# Patient Record
Sex: FEMALE | Race: WHITE | Employment: OTHER | URBAN - METROPOLITAN AREA
[De-identification: names, ages, dates, MRNs, and addresses within clinical notes are randomized per-mention and may not be internally consistent; named-entity substitution may affect disease eponyms.]

---

## 2017-01-03 ENCOUNTER — HOSPITAL ENCOUNTER (OUTPATIENT)
Dept: WOMENS IMAGING | Age: 62
Discharge: HOME | End: 2017-01-03
Attending: INTERNAL MEDICINE | Admitting: INTERNAL MEDICINE

## 2017-01-03 DIAGNOSIS — Z12.39 SCREENING BREAST EXAMINATION: ICD-10-CM

## 2017-01-05 ENCOUNTER — HOSPITAL ENCOUNTER (OUTPATIENT)
Dept: OTHER | Age: 62
Discharge: HOME | End: 2017-01-05
Attending: INTERNAL MEDICINE | Admitting: INTERNAL MEDICINE

## 2017-01-05 LAB
ALBUMIN SERPL-MCNC: 4.1 GM/DL (ref 3.4–5)
ALP BLD-CCNC: 54 IU/L (ref 40–129)
ALT SERPL-CCNC: 16 U/L (ref 10–40)
ANION GAP SERPL CALCULATED.3IONS-SCNC: 14 MMOL/L (ref 4–16)
AST SERPL-CCNC: 21 IU/L (ref 15–37)
BILIRUB SERPL-MCNC: 0.3 MG/DL (ref 0–1)
BUN BLDV-MCNC: 14 MG/DL (ref 6–23)
CALCIUM SERPL-MCNC: 8.9 MG/DL (ref 8.3–10.6)
CHLORIDE BLD-SCNC: 90 MMOL/L (ref 99–110)
CO2: 27 MMOL/L (ref 21–32)
CREAT SERPL-MCNC: 0.9 MG/DL (ref 0.6–1.1)
GFR AFRICAN AMERICAN: >60 ML/MIN/1.73M2
GFR NON-AFRICAN AMERICAN: >60 ML/MIN/1.73M2
GLUCOSE FASTING: 100 MG/DL (ref 70–99)
POTASSIUM SERPL-SCNC: 4 MMOL/L (ref 3.5–5.1)
SODIUM BLD-SCNC: 131 MMOL/L (ref 135–145)
TOTAL PROTEIN: 6.3 GM/DL (ref 6.4–8.2)

## 2017-01-31 ENCOUNTER — HOSPITAL ENCOUNTER (OUTPATIENT)
Dept: OTHER | Age: 62
Discharge: HOME | End: 2017-01-31
Attending: INTERNAL MEDICINE | Admitting: INTERNAL MEDICINE

## 2017-01-31 LAB
ALBUMIN SERPL-MCNC: 4.5 GM/DL (ref 3.4–5)
ALP BLD-CCNC: 56 IU/L (ref 40–128)
ALT SERPL-CCNC: 21 U/L (ref 10–40)
ANION GAP SERPL CALCULATED.3IONS-SCNC: 11 MMOL/L (ref 4–16)
AST SERPL-CCNC: 24 IU/L (ref 15–37)
BILIRUB SERPL-MCNC: 0.6 MG/DL (ref 0–1)
BUN BLDV-MCNC: 12 MG/DL (ref 6–23)
CALCIUM SERPL-MCNC: 9.8 MG/DL (ref 8.3–10.6)
CHLORIDE BLD-SCNC: 95 MMOL/L (ref 99–110)
CO2: 29 MMOL/L (ref 21–32)
CREAT SERPL-MCNC: 0.9 MG/DL (ref 0.6–1.1)
GFR AFRICAN AMERICAN: >60 ML/MIN/1.73M2
GFR NON-AFRICAN AMERICAN: >60 ML/MIN/1.73M2
GLUCOSE FASTING: 79 MG/DL (ref 70–99)
IGA: <10 MG/DL (ref 69–382)
IGG,SERUM: 999 MG/DL (ref 723–1685)
IGM,SERUM: 55 MG/DL (ref 62–277)
POTASSIUM SERPL-SCNC: 4 MMOL/L (ref 3.5–5.1)
SODIUM BLD-SCNC: 135 MMOL/L (ref 135–145)
TOTAL PROTEIN: 6.7 GM/DL (ref 6.4–8.2)

## 2017-02-01 LAB — BETA-2 MICROGLOBULIN: 1.7

## 2017-02-02 LAB
ALBUMIN ELP: 3.9 GM/DL (ref 3.2–5.6)
ALPHA-1-GLOBULIN: 0.2 GM/DL (ref 0.1–0.4)
ALPHA-2-GLOBULIN: 0.7 GM/DL (ref 0.4–1.2)
BETA GLOBULIN: 0.9 GM/DL (ref 0.5–1.3)
GAMMA GLOBULIN: 1 GM/DL (ref 0.5–1.6)
IMMUNOFIXATION ELECTROPHORESIS 1: NORMAL
KAPPA QUANT FREE LIGHT CHAINS: 1.17
KAPPA/LAMBDA FREE LIGHT CHAIN RATIO: 1.36
LAMBDA FREE LIGHT CHAINS URINE/ VOL: 0.86
PATHOLOGIST: NORMAL
TOTAL PROTEIN: 6.7 GM/DL (ref 6.4–8.2)

## 2017-03-02 ENCOUNTER — HOSPITAL ENCOUNTER (OUTPATIENT)
Dept: GENERAL RADIOLOGY | Age: 62
Discharge: HOME | End: 2017-03-02
Attending: INTERNAL MEDICINE | Admitting: INTERNAL MEDICINE

## 2017-03-02 LAB
CHOLESTEROL: 243 MG/DL
ESTIMATED AVERAGE GLUCOSE: 114 MG/DL
HBA1C MFR BLD: 5.6 % (ref 4.2–6.3)
HDLC SERPL-MCNC: 85 MG/DL
HEPATITIS C ANTIBODY: NON REACTIVE
HIV SCREEN: NON REACTIVE
LDL CHOLESTEROL DIRECT: 161 MG/DL
TRIGL SERPL-MCNC: 73 MG/DL

## 2017-03-08 ENCOUNTER — TELEPHONE (OUTPATIENT)
Dept: INTERNAL MEDICINE CLINIC | Age: 62
End: 2017-03-08

## 2017-03-09 ENCOUNTER — OFFICE VISIT (OUTPATIENT)
Dept: INTERNAL MEDICINE CLINIC | Age: 62
End: 2017-03-09

## 2017-03-09 VITALS
SYSTOLIC BLOOD PRESSURE: 134 MMHG | HEART RATE: 71 BPM | HEIGHT: 65 IN | BODY MASS INDEX: 27.16 KG/M2 | DIASTOLIC BLOOD PRESSURE: 76 MMHG | RESPIRATION RATE: 16 BRPM | WEIGHT: 163 LBS

## 2017-03-09 DIAGNOSIS — I10 ESSENTIAL HYPERTENSION: ICD-10-CM

## 2017-03-09 DIAGNOSIS — C90.01 MULTIPLE MYELOMA IN REMISSION (HCC): ICD-10-CM

## 2017-03-09 DIAGNOSIS — J30.89 PERENNIAL ALLERGIC RHINITIS, UNSPECIFIED ALLERGIC RHINITIS TRIGGER: Primary | ICD-10-CM

## 2017-03-09 DIAGNOSIS — E78.2 MIXED HYPERLIPIDEMIA: ICD-10-CM

## 2017-03-09 PROCEDURE — 99213 OFFICE O/P EST LOW 20 MIN: CPT | Performed by: INTERNAL MEDICINE

## 2017-03-09 RX ORDER — CHLORAL HYDRATE 500 MG
CAPSULE ORAL
COMMUNITY

## 2017-03-09 RX ORDER — AZELASTINE 1 MG/ML
2 SPRAY, METERED NASAL 2 TIMES DAILY
Qty: 1 BOTTLE | Refills: 3 | Status: SHIPPED | OUTPATIENT
Start: 2017-03-09 | End: 2018-04-04 | Stop reason: SDUPTHER

## 2017-03-09 RX ORDER — DULOXETIN HYDROCHLORIDE 60 MG/1
60 CAPSULE, DELAYED RELEASE ORAL
COMMUNITY
Start: 2017-03-06 | End: 2020-09-30

## 2017-03-09 RX ORDER — MULTIVITAMIN
CAPSULE ORAL
COMMUNITY

## 2017-03-09 ASSESSMENT — ENCOUNTER SYMPTOMS
EYE PAIN: 0
SORE THROAT: 0
COUGH: 1
SHORTNESS OF BREATH: 0
BACK PAIN: 0
DIARRHEA: 0
ABDOMINAL PAIN: 0
CONSTIPATION: 0
WHEEZING: 0

## 2017-03-28 ENCOUNTER — HOSPITAL ENCOUNTER (OUTPATIENT)
Dept: OTHER | Age: 62
Discharge: HOME | End: 2017-03-28
Attending: INTERNAL MEDICINE | Admitting: INTERNAL MEDICINE

## 2017-03-28 LAB
ALBUMIN SERPL-MCNC: 4.3 GM/DL (ref 3.4–5)
ALP BLD-CCNC: 54 IU/L (ref 40–129)
ALT SERPL-CCNC: 18 U/L (ref 10–40)
ANION GAP SERPL CALCULATED.3IONS-SCNC: 13 MMOL/L (ref 4–16)
AST SERPL-CCNC: 23 IU/L (ref 15–37)
BILIRUB SERPL-MCNC: 0.4 MG/DL (ref 0–1)
BUN BLDV-MCNC: 13 MG/DL (ref 6–23)
CALCIUM SERPL-MCNC: 9 MG/DL (ref 8.3–10.6)
CHLORIDE BLD-SCNC: 94 MMOL/L (ref 99–110)
CO2: 29 MMOL/L (ref 21–32)
CREAT SERPL-MCNC: 1 MG/DL (ref 0.6–1.1)
GFR AFRICAN AMERICAN: >60 ML/MIN/1.73M2
GFR NON-AFRICAN AMERICAN: 56 ML/MIN/1.73M2
GLUCOSE BLD-MCNC: 82 MG/DL (ref 70–140)
IGA: <50 MG/DL (ref 69–382)
IGG,SERUM: 876 MG/DL (ref 723–1685)
IGM,SERUM: 52 MG/DL (ref 62–277)
POTASSIUM SERPL-SCNC: 4.1 MMOL/L (ref 3.5–5.1)
SODIUM BLD-SCNC: 136 MMOL/L (ref 135–145)
TOTAL PROTEIN: 6.2 GM/DL (ref 6.4–8.2)

## 2017-03-30 LAB
ALBUMIN ELP: 3.6 GM/DL (ref 3.2–5.6)
ALPHA-1-GLOBULIN: 0.2 GM/DL (ref 0.1–0.4)
ALPHA-2-GLOBULIN: 0.6 GM/DL (ref 0.4–1.2)
BETA GLOBULIN: 1 GM/DL (ref 0.5–1.3)
BETA-2 MICROGLOBULIN: 1.7
GAMMA GLOBULIN: 0.8 GM/DL (ref 0.5–1.6)
IMMUNOFIXATION ELECTROPHORESIS 1: NORMAL
KAPPA QUANT FREE LIGHT CHAINS: 0.98
KAPPA/LAMBDA FREE LIGHT CHAIN RATIO: 1.11
LAMBDA FREE LIGHT CHAINS URINE/ VOL: 0.88
TOTAL PROTEIN: 6.2 GM/DL (ref 6.4–8.2)

## 2017-04-25 DIAGNOSIS — R07.89 CHEST WALL PAIN: ICD-10-CM

## 2017-04-25 DIAGNOSIS — M54.41 CHRONIC MIDLINE LOW BACK PAIN WITH RIGHT-SIDED SCIATICA: ICD-10-CM

## 2017-04-25 DIAGNOSIS — G89.29 CHRONIC MIDLINE LOW BACK PAIN WITH RIGHT-SIDED SCIATICA: ICD-10-CM

## 2017-04-25 RX ORDER — IBUPROFEN 400 MG/1
400 TABLET ORAL EVERY 6 HOURS PRN
Qty: 120 TABLET | Refills: 0 | Status: SHIPPED | OUTPATIENT
Start: 2017-04-25 | End: 2017-07-10

## 2017-05-09 ENCOUNTER — HOSPITAL ENCOUNTER (OUTPATIENT)
Dept: OTHER | Age: 62
Discharge: HOME | End: 2017-05-09
Attending: INTERNAL MEDICINE | Admitting: INTERNAL MEDICINE

## 2017-05-09 LAB
ALBUMIN SERPL-MCNC: 4.2 GM/DL (ref 3.4–5)
ALP BLD-CCNC: 52 IU/L (ref 40–129)
ALT SERPL-CCNC: 17 U/L (ref 10–40)
ANION GAP SERPL CALCULATED.3IONS-SCNC: 14 MMOL/L (ref 4–16)
AST SERPL-CCNC: 22 IU/L (ref 15–37)
BILIRUB SERPL-MCNC: 0.5 MG/DL (ref 0–1)
BUN BLDV-MCNC: 13 MG/DL (ref 6–23)
CALCIUM SERPL-MCNC: 8.7 MG/DL (ref 8.3–10.6)
CHLORIDE BLD-SCNC: 93 MMOL/L (ref 99–110)
CO2: 25 MMOL/L (ref 21–32)
CREAT SERPL-MCNC: 1 MG/DL (ref 0.6–1.1)
GFR AFRICAN AMERICAN: >60 ML/MIN/1.73M2
GFR NON-AFRICAN AMERICAN: 56 ML/MIN/1.73M2
GLUCOSE BLD-MCNC: 125 MG/DL (ref 70–140)
IGA: <50 MG/DL (ref 69–382)
IGG,SERUM: 819 MG/DL (ref 723–1685)
IGM,SERUM: 50 MG/DL (ref 62–277)
POTASSIUM SERPL-SCNC: 3.8 MMOL/L (ref 3.5–5.1)
SODIUM BLD-SCNC: 132 MMOL/L (ref 135–145)
TOTAL PROTEIN: 5.9 GM/DL (ref 6.4–8.2)

## 2017-05-10 LAB
ALBUMIN ELP: 3.5 GM/DL (ref 3.2–5.6)
ALPHA-1-GLOBULIN: 0.2 GM/DL (ref 0.1–0.4)
ALPHA-2-GLOBULIN: 0.6 GM/DL (ref 0.4–1.2)
BETA GLOBULIN: 0.8 GM/DL (ref 0.5–1.3)
GAMMA GLOBULIN: 0.9 GM/DL (ref 0.5–1.6)
IMMUNOFIXATION ELECTROPHORESIS 1: NORMAL
TOTAL PROTEIN: 5.9 GM/DL (ref 6.4–8.2)

## 2017-05-11 LAB — BETA-2 MICROGLOBULIN: 1.7

## 2017-05-12 LAB
KAPPA QUANT FREE LIGHT CHAINS: 1.26
KAPPA/LAMBDA FREE LIGHT CHAIN RATIO: 1.73
LAMBDA FREE LIGHT CHAINS URINE/ VOL: 0.73

## 2017-07-05 ENCOUNTER — HOSPITAL ENCOUNTER (OUTPATIENT)
Dept: OTHER | Age: 62
Discharge: HOME | End: 2017-07-05
Attending: INTERNAL MEDICINE | Admitting: INTERNAL MEDICINE

## 2017-07-05 LAB
ALBUMIN SERPL-MCNC: 4 GM/DL (ref 3.4–5)
ALP BLD-CCNC: 52 IU/L (ref 40–128)
ALT SERPL-CCNC: 16 U/L (ref 10–40)
ANION GAP SERPL CALCULATED.3IONS-SCNC: 13 MMOL/L (ref 4–16)
AST SERPL-CCNC: 21 IU/L (ref 15–37)
BILIRUB SERPL-MCNC: 0.4 MG/DL (ref 0–1)
BUN BLDV-MCNC: 13 MG/DL (ref 6–23)
CALCIUM SERPL-MCNC: 9.3 MG/DL (ref 8.3–10.6)
CHLORIDE BLD-SCNC: 94 MMOL/L (ref 99–110)
CO2: 27 MMOL/L (ref 21–32)
CREAT SERPL-MCNC: 1 MG/DL (ref 0.6–1.1)
GFR AFRICAN AMERICAN: >60 ML/MIN/1.73M2
GFR NON-AFRICAN AMERICAN: 56 ML/MIN/1.73M2
GLUCOSE BLD-MCNC: 99 MG/DL (ref 70–140)
IGA: <50 MG/DL (ref 69–382)
IGG,SERUM: 837 MG/DL (ref 723–1685)
IGM,SERUM: 51 MG/DL (ref 62–277)
POTASSIUM SERPL-SCNC: 4 MMOL/L (ref 3.5–5.1)
SODIUM BLD-SCNC: 134 MMOL/L (ref 135–145)
TOTAL PROTEIN: 6.2 GM/DL (ref 6.4–8.2)

## 2017-07-07 LAB
ALBUMIN ELP: 3.6 GM/DL (ref 3.2–5.6)
ALPHA-1-GLOBULIN: 0.2 GM/DL (ref 0.1–0.4)
ALPHA-2-GLOBULIN: 0.7 GM/DL (ref 0.4–1.2)
BETA GLOBULIN: 0.8 GM/DL (ref 0.5–1.3)
BETA-2 MICROGLOBULIN: 1.8
GAMMA GLOBULIN: 0.9 GM/DL (ref 0.5–1.6)
IMMUNOFIXATION ELECTROPHORESIS 1: NORMAL
KAPPA QUANT FREE LIGHT CHAINS: 1.37
KAPPA/LAMBDA FREE LIGHT CHAIN RATIO: 1.32
LAMBDA FREE LIGHT CHAINS URINE/ VOL: 1.04
TOTAL PROTEIN: 6.2 GM/DL (ref 6.4–8.2)

## 2017-07-10 ENCOUNTER — OFFICE VISIT (OUTPATIENT)
Dept: INTERNAL MEDICINE CLINIC | Age: 62
End: 2017-07-10

## 2017-07-10 VITALS
WEIGHT: 163.8 LBS | DIASTOLIC BLOOD PRESSURE: 72 MMHG | RESPIRATION RATE: 16 BRPM | OXYGEN SATURATION: 98 % | BODY MASS INDEX: 27.68 KG/M2 | HEART RATE: 66 BPM | SYSTOLIC BLOOD PRESSURE: 128 MMHG

## 2017-07-10 DIAGNOSIS — C90.01 MULTIPLE MYELOMA IN REMISSION (HCC): ICD-10-CM

## 2017-07-10 DIAGNOSIS — E78.2 MIXED HYPERLIPIDEMIA: ICD-10-CM

## 2017-07-10 DIAGNOSIS — I10 ESSENTIAL HYPERTENSION: Primary | ICD-10-CM

## 2017-07-10 PROCEDURE — 99213 OFFICE O/P EST LOW 20 MIN: CPT | Performed by: INTERNAL MEDICINE

## 2017-07-10 RX ORDER — IBUPROFEN 400 MG/1
400 TABLET ORAL EVERY 6 HOURS PRN
Qty: 120 TABLET | Refills: 1 | Status: SHIPPED | OUTPATIENT
Start: 2017-07-10 | End: 2017-08-11 | Stop reason: SDUPTHER

## 2017-07-10 RX ORDER — ATENOLOL AND CHLORTHALIDONE TABLET 50; 25 MG/1; MG/1
0.5 TABLET ORAL DAILY
Qty: 45 TABLET | Refills: 1 | Status: SHIPPED | OUTPATIENT
Start: 2017-07-10 | End: 2017-10-11 | Stop reason: SDUPTHER

## 2017-07-10 RX ORDER — ACYCLOVIR 400 MG/1
TABLET ORAL
COMMUNITY
Start: 2017-06-28 | End: 2020-01-07

## 2017-07-10 ASSESSMENT — ENCOUNTER SYMPTOMS
DIARRHEA: 0
SORE THROAT: 0
WHEEZING: 0
ABDOMINAL PAIN: 0
EYE PAIN: 0
SHORTNESS OF BREATH: 0
BACK PAIN: 0
CONSTIPATION: 0
COUGH: 0

## 2017-07-18 ENCOUNTER — HOSPITAL ENCOUNTER (OUTPATIENT)
Dept: OTHER | Age: 62
Discharge: HOME | End: 2017-07-18
Attending: INTERNAL MEDICINE | Admitting: INTERNAL MEDICINE

## 2017-07-18 LAB
ALBUMIN SERPL-MCNC: 4.2 GM/DL (ref 3.4–5)
ALP BLD-CCNC: 52 IU/L (ref 40–129)
ALT SERPL-CCNC: 16 U/L (ref 10–40)
ANION GAP SERPL CALCULATED.3IONS-SCNC: 12 MMOL/L (ref 4–16)
AST SERPL-CCNC: 22 IU/L (ref 15–37)
BILIRUB SERPL-MCNC: 0.3 MG/DL (ref 0–1)
BUN BLDV-MCNC: 10 MG/DL (ref 6–23)
CALCIUM SERPL-MCNC: 9.3 MG/DL (ref 8.3–10.6)
CHLORIDE BLD-SCNC: 93 MMOL/L (ref 99–110)
CO2: 27 MMOL/L (ref 21–32)
CREAT SERPL-MCNC: 0.9 MG/DL (ref 0.6–1.1)
GFR AFRICAN AMERICAN: >60 ML/MIN/1.73M2
GFR NON-AFRICAN AMERICAN: >60 ML/MIN/1.73M2
GLUCOSE BLD-MCNC: 91 MG/DL (ref 70–140)
POTASSIUM SERPL-SCNC: 4.2 MMOL/L (ref 3.5–5.1)
SODIUM BLD-SCNC: 132 MMOL/L (ref 135–145)
TOTAL PROTEIN: 6.3 GM/DL (ref 6.4–8.2)

## 2017-08-11 RX ORDER — IBUPROFEN 400 MG/1
400 TABLET ORAL EVERY 6 HOURS PRN
Qty: 120 TABLET | Refills: 1 | Status: SHIPPED | OUTPATIENT
Start: 2017-08-11 | End: 2018-04-04 | Stop reason: SDUPTHER

## 2017-08-15 ENCOUNTER — HOSPITAL ENCOUNTER (OUTPATIENT)
Dept: OTHER | Age: 62
Discharge: HOME | End: 2017-08-15
Attending: INTERNAL MEDICINE | Admitting: INTERNAL MEDICINE

## 2017-08-15 LAB
ALBUMIN SERPL-MCNC: 4.1 GM/DL (ref 3.4–5)
ALP BLD-CCNC: 57 IU/L (ref 40–128)
ALT SERPL-CCNC: 16 U/L (ref 10–40)
ANION GAP SERPL CALCULATED.3IONS-SCNC: 12 MMOL/L (ref 4–16)
AST SERPL-CCNC: 21 IU/L (ref 15–37)
BILIRUB SERPL-MCNC: 0.4 MG/DL (ref 0–1)
BUN BLDV-MCNC: 15 MG/DL (ref 6–23)
CALCIUM SERPL-MCNC: 9.4 MG/DL (ref 8.3–10.6)
CHLORIDE BLD-SCNC: 92 MMOL/L (ref 99–110)
CO2: 27 MMOL/L (ref 21–32)
CREAT SERPL-MCNC: 1 MG/DL (ref 0.6–1.1)
GFR AFRICAN AMERICAN: >60 ML/MIN/1.73M2
GFR NON-AFRICAN AMERICAN: 56 ML/MIN/1.73M2
GLUCOSE BLD-MCNC: 101 MG/DL (ref 70–140)
POTASSIUM SERPL-SCNC: 4.1 MMOL/L (ref 3.5–5.1)
SODIUM BLD-SCNC: 131 MMOL/L (ref 135–145)
TOTAL PROTEIN: 6.3 GM/DL (ref 6.4–8.2)

## 2017-08-29 ENCOUNTER — HOSPITAL ENCOUNTER (OUTPATIENT)
Dept: OTHER | Age: 62
Discharge: HOME | End: 2017-08-29
Attending: INTERNAL MEDICINE | Admitting: INTERNAL MEDICINE

## 2017-08-29 LAB
ALBUMIN SERPL-MCNC: 4.3 GM/DL (ref 3.4–5)
ALP BLD-CCNC: 57 IU/L (ref 40–129)
ALT SERPL-CCNC: 17 U/L (ref 10–40)
ANION GAP SERPL CALCULATED.3IONS-SCNC: 10 MMOL/L (ref 4–16)
AST SERPL-CCNC: 23 IU/L (ref 15–37)
BILIRUB SERPL-MCNC: 0.3 MG/DL (ref 0–1)
BUN BLDV-MCNC: 17 MG/DL (ref 6–23)
CALCIUM SERPL-MCNC: 9.5 MG/DL (ref 8.3–10.6)
CHLORIDE BLD-SCNC: 90 MMOL/L (ref 99–110)
CO2: 28 MMOL/L (ref 21–32)
CREAT SERPL-MCNC: 1 MG/DL (ref 0.6–1.1)
GFR AFRICAN AMERICAN: >60 ML/MIN/1.73M2
GFR NON-AFRICAN AMERICAN: 56 ML/MIN/1.73M2
GLUCOSE BLD-MCNC: 101 MG/DL (ref 70–140)
IGA: <50 MG/DL (ref 69–382)
IGG,SERUM: 906 MG/DL (ref 723–1685)
IGM,SERUM: 50 MG/DL (ref 62–277)
POTASSIUM SERPL-SCNC: 4 MMOL/L (ref 3.5–5.1)
SODIUM BLD-SCNC: 128 MMOL/L (ref 135–145)
TOTAL PROTEIN: 6.4 GM/DL (ref 6.4–8.2)

## 2017-08-30 LAB
ALBUMIN ELP: 3.7 GM/DL (ref 3.2–5.6)
ALPHA-1-GLOBULIN: 0.2 GM/DL (ref 0.1–0.4)
ALPHA-2-GLOBULIN: 0.7 GM/DL (ref 0.4–1.2)
BETA GLOBULIN: 0.8 GM/DL (ref 0.5–1.3)
GAMMA GLOBULIN: 0.9 GM/DL (ref 0.5–1.6)
IMMUNOFIXATION ELECTROPHORESIS 1: NORMAL
TOTAL PROTEIN: 6.4 GM/DL (ref 6.4–8.2)

## 2017-08-31 LAB
BETA-2 MICROGLOBULIN: 1.9
KAPPA QUANT FREE LIGHT CHAINS: 1.06
KAPPA/LAMBDA FREE LIGHT CHAIN RATIO: 1.25
LAMBDA FREE LIGHT CHAINS URINE/ VOL: 0.85

## 2017-09-26 ENCOUNTER — HOSPITAL ENCOUNTER (OUTPATIENT)
Dept: OTHER | Age: 62
Discharge: HOME | End: 2017-09-26
Attending: INTERNAL MEDICINE | Admitting: INTERNAL MEDICINE

## 2017-09-26 LAB
ALBUMIN SERPL-MCNC: 4.3 GM/DL (ref 3.4–5)
ALP BLD-CCNC: 55 IU/L (ref 40–128)
ALT SERPL-CCNC: 16 U/L (ref 10–40)
ANION GAP SERPL CALCULATED.3IONS-SCNC: 13 MMOL/L (ref 4–16)
AST SERPL-CCNC: 21 IU/L (ref 15–37)
BILIRUB SERPL-MCNC: 0.4 MG/DL (ref 0–1)
BUN BLDV-MCNC: 14 MG/DL (ref 6–23)
CALCIUM SERPL-MCNC: 8.9 MG/DL (ref 8.3–10.6)
CHLORIDE BLD-SCNC: 92 MMOL/L (ref 99–110)
CO2: 28 MMOL/L (ref 21–32)
CREAT SERPL-MCNC: 1 MG/DL (ref 0.6–1.1)
GFR AFRICAN AMERICAN: >60 ML/MIN/1.73M2
GFR NON-AFRICAN AMERICAN: 56 ML/MIN/1.73M2
GLUCOSE BLD-MCNC: 80 MG/DL (ref 70–140)
POTASSIUM SERPL-SCNC: 4.4 MMOL/L (ref 3.5–5.1)
SODIUM BLD-SCNC: 133 MMOL/L (ref 135–145)
TOTAL PROTEIN: 6.3 GM/DL (ref 6.4–8.2)

## 2017-10-10 ENCOUNTER — TELEPHONE (OUTPATIENT)
Dept: INTERNAL MEDICINE CLINIC | Age: 62
End: 2017-10-10

## 2017-10-10 ENCOUNTER — HOSPITAL ENCOUNTER (OUTPATIENT)
Dept: OTHER | Age: 62
Discharge: HOME | End: 2017-10-10
Attending: INTERNAL MEDICINE | Admitting: INTERNAL MEDICINE

## 2017-10-10 LAB
ALBUMIN SERPL-MCNC: 4.2 GM/DL (ref 3.4–5)
ALP BLD-CCNC: 53 IU/L (ref 40–128)
ALT SERPL-CCNC: 18 U/L (ref 10–40)
ANION GAP SERPL CALCULATED.3IONS-SCNC: 11 MMOL/L (ref 4–16)
AST SERPL-CCNC: 23 IU/L (ref 15–37)
BILIRUB SERPL-MCNC: 0.3 MG/DL (ref 0–1)
BUN BLDV-MCNC: 11 MG/DL (ref 6–23)
CALCIUM SERPL-MCNC: 9.2 MG/DL (ref 8.3–10.6)
CHLORIDE BLD-SCNC: 90 MMOL/L (ref 99–110)
CO2: 30 MMOL/L (ref 21–32)
CREAT SERPL-MCNC: 0.9 MG/DL (ref 0.6–1.1)
GFR AFRICAN AMERICAN: >60 ML/MIN/1.73M2
GFR NON-AFRICAN AMERICAN: >60 ML/MIN/1.73M2
GLUCOSE BLD-MCNC: 96 MG/DL (ref 70–140)
POTASSIUM SERPL-SCNC: 3.8 MMOL/L (ref 3.5–5.1)
SODIUM BLD-SCNC: 131 MMOL/L (ref 135–145)
TOTAL PROTEIN: 6.4 GM/DL (ref 6.4–8.2)

## 2017-10-11 ENCOUNTER — OFFICE VISIT (OUTPATIENT)
Dept: INTERNAL MEDICINE CLINIC | Age: 62
End: 2017-10-11

## 2017-10-11 VITALS
HEART RATE: 70 BPM | SYSTOLIC BLOOD PRESSURE: 126 MMHG | RESPIRATION RATE: 16 BRPM | OXYGEN SATURATION: 98 % | BODY MASS INDEX: 27.55 KG/M2 | DIASTOLIC BLOOD PRESSURE: 74 MMHG | WEIGHT: 163 LBS

## 2017-10-11 DIAGNOSIS — E78.2 MIXED HYPERLIPIDEMIA: ICD-10-CM

## 2017-10-11 DIAGNOSIS — C90.01 MULTIPLE MYELOMA IN REMISSION (HCC): ICD-10-CM

## 2017-10-11 DIAGNOSIS — Z12.11 COLON CANCER SCREENING: ICD-10-CM

## 2017-10-11 DIAGNOSIS — I10 ESSENTIAL HYPERTENSION: Primary | ICD-10-CM

## 2017-10-11 DIAGNOSIS — F32.A DEPRESSION, UNSPECIFIED DEPRESSION TYPE: ICD-10-CM

## 2017-10-11 PROCEDURE — 99213 OFFICE O/P EST LOW 20 MIN: CPT | Performed by: INTERNAL MEDICINE

## 2017-10-11 RX ORDER — ATENOLOL AND CHLORTHALIDONE TABLET 50; 25 MG/1; MG/1
0.5 TABLET ORAL DAILY
Qty: 45 TABLET | Refills: 1 | Status: SHIPPED | OUTPATIENT
Start: 2017-10-11 | End: 2018-01-03 | Stop reason: SDUPTHER

## 2017-10-11 ASSESSMENT — ENCOUNTER SYMPTOMS
ABDOMINAL PAIN: 0
COUGH: 0
BACK PAIN: 0
SHORTNESS OF BREATH: 0
SORE THROAT: 0
EYE PAIN: 0
CONSTIPATION: 0
DIARRHEA: 0
WHEEZING: 0

## 2017-10-11 NOTE — PROGRESS NOTES
numbness and headaches. Psychiatric/Behavioral: Positive for dysphoric mood. Negative for sleep disturbance. The patient is not nervous/anxious. Current Outpatient Prescriptions   Medication Sig Dispense Refill    atenolol-chlorthalidone (TENORETIC) 50-25 MG per tablet Take 0.5 tablets by mouth daily 45 tablet 1    ibuprofen (ADVIL;MOTRIN) 400 MG tablet Take 1 tablet by mouth every 6 hours as needed for Pain 120 tablet 1    acyclovir (ZOVIRAX) 400 MG tablet       Multiple Vitamin (MULTIVITAMIN) capsule Take by mouth      Omega-3 Fatty Acids (FISH OIL) 1000 MG CAPS Take by mouth      DULoxetine (CYMBALTA) 60 MG extended release capsule Take 60 mg by mouth      azelastine (ASTELIN) 0.1 % nasal spray 2 sprays by Nasal route 2 times daily Use in each nostril as directed 1 Bottle 3    Bortezomib (VELCADE IJ) Inject as directed       No current facility-administered medications for this visit. Objective:  /74   Pulse 70   Resp 16   Wt 163 lb (73.9 kg)   SpO2 98%   BMI 27.55 kg/m²   BP Readings from Last 3 Encounters:   10/11/17 126/74   07/10/17 128/72   03/09/17 134/76     Wt Readings from Last 3 Encounters:   10/11/17 163 lb (73.9 kg)   07/10/17 163 lb 12.8 oz (74.3 kg)   03/09/17 163 lb (73.9 kg)         Physical Exam   Constitutional: She is oriented to person, place, and time. She appears well-developed and well-nourished. No distress. HENT:   Head: Normocephalic and atraumatic. Eyes: Conjunctivae are normal. No scleral icterus. Neck: Normal range of motion. Neck supple. Cardiovascular: Normal rate and regular rhythm. Pulmonary/Chest: Effort normal and breath sounds normal. No respiratory distress. She has no wheezes. Abdominal: Soft. Bowel sounds are normal. She exhibits no distension. There is no tenderness. Neurological: She is alert and oriented to person, place, and time. Psychiatric: She has a normal mood and affect.  Judgment normal.       Lab Results Component Value Date    WBC 5.0 08/15/2017    HGB 11.9 08/15/2017    HCT 33.9 (L) 08/15/2017    MCV 90.6 08/15/2017    .0 08/15/2017     Lab Results   Component Value Date     (L) 10/10/2017    K 3.8 10/10/2017    CL 90 (L) 10/10/2017    CO2 30 10/10/2017    BUN 11 10/10/2017    CREATININE 0.9 10/10/2017    GLUCOSE 96 10/10/2017    CALCIUM 9.2 10/10/2017    PROT 6.4 10/10/2017    LABALBU 4.2 10/10/2017    BILITOT 0.3 10/10/2017    ALKPHOS 53 10/10/2017    AST 23 10/10/2017    ALT 18 10/10/2017    LABGLOM >60 10/10/2017    GFRAA >60 10/10/2017    AGRATIO 0.3 (L) 09/09/2015    GLOB 10.1 09/09/2015     Lab Results   Component Value Date    CHOL 243 (H) 03/02/2017    CHOL 128 09/09/2015     Lab Results   Component Value Date    TRIG 73 03/02/2017    TRIG 72 09/09/2015     Lab Results   Component Value Date    HDL 85 03/02/2017    HDL 35 (L) 09/09/2015     Lab Results   Component Value Date    LDLCALC 79 09/09/2015     Lab Results   Component Value Date    LABA1C 5.6 03/02/2017         ASSESSMENT:      1. Essential hypertension    2. Multiple myeloma in remission ([facility] [address]    3. Mixed hyperlipidemia    4. Colon cancer screening    5. Depression, unspecified depression type        PLAN:  1. Medications reviewed and reconciled. She is compliant and tolerating the medications without any side effects. Necessary refills provided. 2.  Continue follow-up with oncology. Currently on chemotherapy with Velcade. 3.  Counseling offered for mild depression but patient is deferring for now. 4.  FIT for colon cancer screening. Orders Placed This Encounter   Medications    atenolol-chlorthalidone (TENORETIC) 50-25 MG per tablet     Sig: Take 0.5 tablets by mouth daily     Dispense:  45 tablet     Refill:  1     Orders Placed This Encounter   Procedures    POCT Fecal Immunochemical Test (FIT)       Care discussed with patient. Questions answered. Patient verbalizes understanding and agrees with plan.    After visit

## 2017-10-17 ENCOUNTER — TELEPHONE (OUTPATIENT)
Dept: INTERNAL MEDICINE CLINIC | Age: 62
End: 2017-10-17

## 2017-10-17 DIAGNOSIS — Z12.11 COLON CANCER SCREENING: ICD-10-CM

## 2017-10-17 PROCEDURE — 82274 ASSAY TEST FOR BLOOD FECAL: CPT | Performed by: INTERNAL MEDICINE

## 2017-10-18 LAB
CONTROL: NORMAL
HEMOCCULT STL QL: NORMAL

## 2017-10-18 NOTE — TELEPHONE ENCOUNTER
Please call and let patient know that FIT (the stool test) colon cancer screening was negative.   It will be due again in 1 year

## 2017-10-20 ENCOUNTER — NURSE ONLY (OUTPATIENT)
Dept: INTERNAL MEDICINE CLINIC | Age: 62
End: 2017-10-20

## 2017-10-20 DIAGNOSIS — Z23 NEEDS FLU SHOT: Primary | ICD-10-CM

## 2017-10-20 PROCEDURE — 90688 IIV4 VACCINE SPLT 0.5 ML IM: CPT | Performed by: INTERNAL MEDICINE

## 2017-10-20 PROCEDURE — 90471 IMMUNIZATION ADMIN: CPT | Performed by: INTERNAL MEDICINE

## 2017-10-20 PROCEDURE — 90670 PCV13 VACCINE IM: CPT | Performed by: INTERNAL MEDICINE

## 2017-10-20 PROCEDURE — 90472 IMMUNIZATION ADMIN EACH ADD: CPT | Performed by: INTERNAL MEDICINE

## 2017-11-07 ENCOUNTER — HOSPITAL ENCOUNTER (OUTPATIENT)
Dept: OTHER | Age: 62
Discharge: HOME | End: 2017-11-07
Attending: INTERNAL MEDICINE | Admitting: INTERNAL MEDICINE

## 2017-11-07 LAB
ALBUMIN SERPL-MCNC: 4.3 GM/DL (ref 3.4–5)
ALP BLD-CCNC: 52 IU/L (ref 40–128)
ALT SERPL-CCNC: 17 U/L (ref 10–40)
ANION GAP SERPL CALCULATED.3IONS-SCNC: 11 MMOL/L (ref 4–16)
AST SERPL-CCNC: 22 IU/L (ref 15–37)
BILIRUB SERPL-MCNC: 0.3 MG/DL (ref 0–1)
BUN BLDV-MCNC: 11 MG/DL (ref 6–23)
CALCIUM SERPL-MCNC: 9 MG/DL (ref 8.3–10.6)
CHLORIDE BLD-SCNC: 92 MMOL/L (ref 99–110)
CO2: 28 MMOL/L (ref 21–32)
CREAT SERPL-MCNC: 0.9 MG/DL (ref 0.6–1.1)
GFR AFRICAN AMERICAN: >60 ML/MIN/1.73M2
GFR NON-AFRICAN AMERICAN: >60 ML/MIN/1.73M2
GLUCOSE BLD-MCNC: 147 MG/DL (ref 70–140)
POTASSIUM SERPL-SCNC: 3.7 MMOL/L (ref 3.5–5.1)
SODIUM BLD-SCNC: 131 MMOL/L (ref 135–145)
TOTAL PROTEIN: 6.5 GM/DL (ref 6.4–8.2)

## 2017-11-21 ENCOUNTER — HOSPITAL ENCOUNTER (OUTPATIENT)
Dept: OTHER | Age: 62
Discharge: HOME | End: 2017-11-21
Attending: INTERNAL MEDICINE | Admitting: INTERNAL MEDICINE

## 2017-11-21 LAB
IGA: 72 MG/DL (ref 69–382)
IGG,SERUM: 1086 MG/DL (ref 723–1685)
IGM,SERUM: 69 MG/DL (ref 62–277)

## 2017-11-22 LAB
ALBUMIN ELP: 4 GM/DL (ref 3.2–5.6)
ALPHA-1-GLOBULIN: 0.2 GM/DL (ref 0.1–0.4)
ALPHA-2-GLOBULIN: 0.7 GM/DL (ref 0.4–1.2)
BETA GLOBULIN: 0.9 GM/DL (ref 0.5–1.3)
GAMMA GLOBULIN: 1.2 GM/DL (ref 0.5–1.6)
TOTAL PROTEIN: 6.9 GM/DL (ref 6.4–8.2)

## 2017-11-23 LAB — BETA-2 MICROGLOBULIN: 1.8

## 2017-11-24 LAB
KAPPA QUANT FREE LIGHT CHAINS: 1.61
KAPPA/LAMBDA FREE LIGHT CHAIN RATIO: 1.58
LAMBDA FREE LIGHT CHAINS URINE/ VOL: 1.02

## 2017-12-19 ENCOUNTER — HOSPITAL ENCOUNTER (OUTPATIENT)
Dept: OTHER | Age: 62
Discharge: HOME | End: 2017-12-19
Attending: INTERNAL MEDICINE | Admitting: INTERNAL MEDICINE

## 2017-12-19 LAB
ALBUMIN SERPL-MCNC: 4.3 GM/DL (ref 3.4–5)
ALP BLD-CCNC: 62 IU/L (ref 40–129)
ALT SERPL-CCNC: 18 U/L (ref 10–40)
ANION GAP SERPL CALCULATED.3IONS-SCNC: 13 MMOL/L (ref 4–16)
AST SERPL-CCNC: 23 IU/L (ref 15–37)
BILIRUB SERPL-MCNC: 0.4 MG/DL (ref 0–1)
BUN BLDV-MCNC: 18 MG/DL (ref 6–23)
CALCIUM SERPL-MCNC: 9.4 MG/DL (ref 8.3–10.6)
CHLORIDE BLD-SCNC: 92 MMOL/L (ref 99–110)
CO2: 29 MMOL/L (ref 21–32)
CREAT SERPL-MCNC: 1 MG/DL (ref 0.6–1.1)
GFR AFRICAN AMERICAN: >60 ML/MIN/1.73M2
GFR NON-AFRICAN AMERICAN: 56 ML/MIN/1.73M2
GLUCOSE BLD-MCNC: 109 MG/DL (ref 70–99)
POTASSIUM SERPL-SCNC: 4.6 MMOL/L (ref 3.5–5.1)
SODIUM BLD-SCNC: 134 MMOL/L (ref 135–145)
TOTAL PROTEIN: 6.9 GM/DL (ref 6.4–8.2)

## 2018-01-02 ENCOUNTER — HOSPITAL ENCOUNTER (OUTPATIENT)
Dept: OTHER | Age: 63
Discharge: HOME | End: 2018-01-02
Attending: INTERNAL MEDICINE | Admitting: INTERNAL MEDICINE

## 2018-01-02 LAB
ALBUMIN SERPL-MCNC: 4.6 GM/DL (ref 3.4–5)
ALP BLD-CCNC: 64 IU/L (ref 40–129)
ALT SERPL-CCNC: 18 U/L (ref 10–40)
ANION GAP SERPL CALCULATED.3IONS-SCNC: 10 MMOL/L (ref 4–16)
AST SERPL-CCNC: 22 IU/L (ref 15–37)
BILIRUB SERPL-MCNC: 0.3 MG/DL (ref 0–1)
BUN BLDV-MCNC: 12 MG/DL (ref 6–23)
CALCIUM SERPL-MCNC: 9.5 MG/DL (ref 8.3–10.6)
CHLORIDE BLD-SCNC: 91 MMOL/L (ref 99–110)
CO2: 31 MMOL/L (ref 21–32)
CREAT SERPL-MCNC: 0.9 MG/DL (ref 0.6–1.1)
GFR AFRICAN AMERICAN: >60 ML/MIN/1.73M2
GFR NON-AFRICAN AMERICAN: >60 ML/MIN/1.73M2
GLUCOSE BLD-MCNC: 93 MG/DL (ref 70–99)
POTASSIUM SERPL-SCNC: 4.1 MMOL/L (ref 3.5–5.1)
SODIUM BLD-SCNC: 132 MMOL/L (ref 135–145)
TOTAL PROTEIN: 7.1 GM/DL (ref 6.4–8.2)

## 2018-01-03 ENCOUNTER — OFFICE VISIT (OUTPATIENT)
Dept: INTERNAL MEDICINE CLINIC | Age: 63
End: 2018-01-03

## 2018-01-03 VITALS
DIASTOLIC BLOOD PRESSURE: 82 MMHG | RESPIRATION RATE: 16 BRPM | HEART RATE: 77 BPM | WEIGHT: 165 LBS | OXYGEN SATURATION: 100 % | BODY MASS INDEX: 27.88 KG/M2 | SYSTOLIC BLOOD PRESSURE: 138 MMHG

## 2018-01-03 DIAGNOSIS — C90.01 MULTIPLE MYELOMA IN REMISSION (HCC): ICD-10-CM

## 2018-01-03 DIAGNOSIS — F41.9 ANXIETY: ICD-10-CM

## 2018-01-03 DIAGNOSIS — B35.1 ONYCHOMYCOSIS: ICD-10-CM

## 2018-01-03 DIAGNOSIS — I10 ESSENTIAL HYPERTENSION: Primary | ICD-10-CM

## 2018-01-03 PROCEDURE — 3017F COLORECTAL CA SCREEN DOC REV: CPT | Performed by: INTERNAL MEDICINE

## 2018-01-03 PROCEDURE — 1036F TOBACCO NON-USER: CPT | Performed by: INTERNAL MEDICINE

## 2018-01-03 PROCEDURE — G8417 CALC BMI ABV UP PARAM F/U: HCPCS | Performed by: INTERNAL MEDICINE

## 2018-01-03 PROCEDURE — G8427 DOCREV CUR MEDS BY ELIG CLIN: HCPCS | Performed by: INTERNAL MEDICINE

## 2018-01-03 PROCEDURE — 3014F SCREEN MAMMO DOC REV: CPT | Performed by: INTERNAL MEDICINE

## 2018-01-03 PROCEDURE — 99213 OFFICE O/P EST LOW 20 MIN: CPT | Performed by: INTERNAL MEDICINE

## 2018-01-03 PROCEDURE — G8484 FLU IMMUNIZE NO ADMIN: HCPCS | Performed by: INTERNAL MEDICINE

## 2018-01-03 RX ORDER — ATENOLOL AND CHLORTHALIDONE TABLET 50; 25 MG/1; MG/1
0.5 TABLET ORAL DAILY
Qty: 45 TABLET | Refills: 1 | Status: SHIPPED | OUTPATIENT
Start: 2018-01-03 | End: 2018-04-04 | Stop reason: SDUPTHER

## 2018-01-03 RX ORDER — FLUOROURACIL 50 MG/G
CREAM TOPICAL 2 TIMES DAILY
COMMUNITY
End: 2018-07-11

## 2018-01-03 ASSESSMENT — ENCOUNTER SYMPTOMS
ABDOMINAL PAIN: 0
DIARRHEA: 0
CONSTIPATION: 0
COUGH: 0
SHORTNESS OF BREATH: 0
BACK PAIN: 0
SORE THROAT: 0
WHEEZING: 0
EYE PAIN: 0

## 2018-01-03 NOTE — PROGRESS NOTES
Kanika Brambila   58 y.o.  female  [id number]      Chief Complaint   Patient presents with    Follow-up    Hypertension    Other     Finger nail issue    Multiple Myeloma        Subjective:  58 y. o.female is here for a follow up. She has the following chronic/acute medical problems:    HTN (hypertension)  The patient is taking hypertensive medications compliantly without side effects. Denies chest pain, dyspnea, edema, or TIA's.  Anxiety  Much improved. Doing well on Cymbalta.  Multiple myeloma [facility])  Following with oncology. On maintenance chemotherapy with Velcade.  Mixed hyperlipidemia  Last LDL was 161. She is not currently on any medications. Trying to manage with diet and lifestyle modifications. Taking omega-3. Review of Systems   Constitutional: Negative for chills and fever. HENT: Negative for congestion and sore throat. Eyes: Negative for pain and visual disturbance. Respiratory: Negative for cough, shortness of breath and wheezing. Cardiovascular: Negative for chest pain, palpitations and leg swelling. Gastrointestinal: Negative for abdominal pain, constipation and diarrhea. Genitourinary: Negative for dysuria and hematuria. Musculoskeletal: Negative for back pain and neck pain. Skin: Negative for rash. Neurological: Negative for dizziness, weakness, numbness and headaches. Psychiatric/Behavioral: Negative for sleep disturbance. The patient is not nervous/anxious. Current Outpatient Prescriptions   Medication Sig Dispense Refill    fluorouracil (EFUDEX) 5 % cream Apply topically 2 times daily Apply topically 2 times daily.       Ciclopirox 8 % KIT Apply 1 applicator topically daily 34.6 mL 0    atenolol-chlorthalidone (TENORETIC) 50-25 MG per tablet Take 0.5 tablets by mouth daily 45 tablet 1    ibuprofen (ADVIL;MOTRIN) 400 MG tablet Take 1 tablet by mouth every 6 hours as needed for Pain 120 tablet 1    acyclovir (ZOVIRAX) 400 MG tablet

## 2018-01-16 ENCOUNTER — HOSPITAL ENCOUNTER (OUTPATIENT)
Dept: OTHER | Age: 63
Discharge: HOME | End: 2018-01-16
Attending: INTERNAL MEDICINE | Admitting: INTERNAL MEDICINE

## 2018-01-16 LAB
ALBUMIN SERPL-MCNC: 4.4 GM/DL (ref 3.4–5)
ALP BLD-CCNC: 68 IU/L (ref 40–129)
ALT SERPL-CCNC: 21 U/L (ref 10–40)
ANION GAP SERPL CALCULATED.3IONS-SCNC: 11 MMOL/L (ref 4–16)
AST SERPL-CCNC: 25 IU/L (ref 15–37)
BILIRUB SERPL-MCNC: 0.2 MG/DL (ref 0–1)
BUN BLDV-MCNC: 10 MG/DL (ref 6–23)
CALCIUM SERPL-MCNC: 9.4 MG/DL (ref 8.3–10.6)
CHLORIDE BLD-SCNC: 93 MMOL/L (ref 99–110)
CO2: 29 MMOL/L (ref 21–32)
CREAT SERPL-MCNC: 0.9 MG/DL (ref 0.6–1.1)
GFR AFRICAN AMERICAN: >60 ML/MIN/1.73M2
GFR NON-AFRICAN AMERICAN: >60 ML/MIN/1.73M2
GLUCOSE BLD-MCNC: 114 MG/DL (ref 70–99)
POTASSIUM SERPL-SCNC: 3.6 MMOL/L (ref 3.5–5.1)
SODIUM BLD-SCNC: 133 MMOL/L (ref 135–145)
TOTAL PROTEIN: 6.6 GM/DL (ref 6.4–8.2)

## 2018-02-13 ENCOUNTER — HOSPITAL ENCOUNTER (OUTPATIENT)
Dept: OTHER | Age: 63
Discharge: HOME | End: 2018-02-13
Attending: INTERNAL MEDICINE | Admitting: INTERNAL MEDICINE

## 2018-02-13 LAB
ALBUMIN SERPL-MCNC: 4.3 GM/DL (ref 3.4–5)
ALP BLD-CCNC: 57 IU/L (ref 40–128)
ALT SERPL-CCNC: 17 U/L (ref 10–40)
ANION GAP SERPL CALCULATED.3IONS-SCNC: 12 MMOL/L (ref 4–16)
AST SERPL-CCNC: 23 IU/L (ref 15–37)
BILIRUB SERPL-MCNC: 0.3 MG/DL (ref 0–1)
BUN BLDV-MCNC: 12 MG/DL (ref 6–23)
CALCIUM SERPL-MCNC: 9.2 MG/DL (ref 8.3–10.6)
CHLORIDE BLD-SCNC: 90 MMOL/L (ref 99–110)
CO2: 28 MMOL/L (ref 21–32)
CREAT SERPL-MCNC: 0.8 MG/DL (ref 0.6–1.1)
GFR AFRICAN AMERICAN: >60 ML/MIN/1.73M2
GFR NON-AFRICAN AMERICAN: >60 ML/MIN/1.73M2
GLUCOSE BLD-MCNC: 96 MG/DL (ref 70–99)
IGA: 65 MG/DL (ref 69–382)
IGG,SERUM: 1134 MG/DL (ref 723–1685)
IGM,SERUM: 64 MG/DL (ref 62–277)
POTASSIUM SERPL-SCNC: 3.8 MMOL/L (ref 3.5–5.1)
SODIUM BLD-SCNC: 130 MMOL/L (ref 135–145)
TOTAL PROTEIN: 6.7 GM/DL (ref 6.4–8.2)

## 2018-02-14 LAB
ALBUMIN ELP: 3.9 GM/DL (ref 3.2–5.6)
ALPHA-1-GLOBULIN: 0.2 GM/DL (ref 0.1–0.4)
ALPHA-2-GLOBULIN: 0.6 GM/DL (ref 0.4–1.2)
BETA GLOBULIN: 0.8 GM/DL (ref 0.5–1.3)
GAMMA GLOBULIN: 1.1 GM/DL (ref 0.5–1.6)
IMMUNOFIXATION ELECTROPHORESIS 1: NORMAL
TOTAL PROTEIN: 6.7 GM/DL (ref 6.4–8.2)

## 2018-02-15 LAB — BETA-2 MICROGLOBULIN: 1.9

## 2018-02-16 LAB
KAPPA QUANT FREE LIGHT CHAINS: 1.29
KAPPA/LAMBDA FREE LIGHT CHAIN RATIO: 1.72
LAMBDA FREE LIGHT CHAINS URINE/ VOL: 0.75

## 2018-02-27 ENCOUNTER — HOSPITAL ENCOUNTER (OUTPATIENT)
Dept: OTHER | Age: 63
Discharge: HOME | End: 2018-02-27
Attending: INTERNAL MEDICINE | Admitting: INTERNAL MEDICINE

## 2018-02-27 LAB
ALBUMIN SERPL-MCNC: 4.1 GM/DL (ref 3.4–5)
ALP BLD-CCNC: 54 IU/L (ref 40–129)
ALT SERPL-CCNC: 15 U/L (ref 10–40)
ANION GAP SERPL CALCULATED.3IONS-SCNC: 10 MMOL/L (ref 4–16)
AST SERPL-CCNC: 20 IU/L (ref 15–37)
BILIRUB SERPL-MCNC: 0.3 MG/DL (ref 0–1)
BUN BLDV-MCNC: 11 MG/DL (ref 6–23)
CALCIUM SERPL-MCNC: 9 MG/DL (ref 8.3–10.6)
CHLORIDE BLD-SCNC: 91 MMOL/L (ref 99–110)
CO2: 30 MMOL/L (ref 21–32)
CREAT SERPL-MCNC: 1 MG/DL (ref 0.6–1.1)
GFR AFRICAN AMERICAN: >60 ML/MIN/1.73M2
GFR NON-AFRICAN AMERICAN: 56 ML/MIN/1.73M2
GLUCOSE BLD-MCNC: 115 MG/DL (ref 70–99)
POTASSIUM SERPL-SCNC: 3.5 MMOL/L (ref 3.5–5.1)
SODIUM BLD-SCNC: 131 MMOL/L (ref 135–145)
TOTAL PROTEIN: 6.2 GM/DL (ref 6.4–8.2)

## 2018-03-13 ENCOUNTER — HOSPITAL ENCOUNTER (OUTPATIENT)
Dept: OTHER | Age: 63
Discharge: HOME | End: 2018-03-13
Attending: INTERNAL MEDICINE | Admitting: INTERNAL MEDICINE

## 2018-03-13 LAB
ALBUMIN SERPL-MCNC: 4 GM/DL (ref 3.4–5)
ALP BLD-CCNC: 55 IU/L (ref 40–128)
ALT SERPL-CCNC: 17 U/L (ref 10–40)
ANION GAP SERPL CALCULATED.3IONS-SCNC: 13 MMOL/L (ref 4–16)
AST SERPL-CCNC: 21 IU/L (ref 15–37)
BILIRUB SERPL-MCNC: 0.3 MG/DL (ref 0–1)
BUN BLDV-MCNC: 16 MG/DL (ref 6–23)
CALCIUM SERPL-MCNC: 9 MG/DL (ref 8.3–10.6)
CHLORIDE BLD-SCNC: 88 MMOL/L (ref 99–110)
CO2: 28 MMOL/L (ref 21–32)
CREAT SERPL-MCNC: 1.1 MG/DL (ref 0.6–1.1)
GFR AFRICAN AMERICAN: >60 ML/MIN/1.73M2
GFR NON-AFRICAN AMERICAN: 50 ML/MIN/1.73M2
GLUCOSE BLD-MCNC: 110 MG/DL (ref 70–99)
POTASSIUM SERPL-SCNC: 3.8 MMOL/L (ref 3.5–5.1)
SODIUM BLD-SCNC: 129 MMOL/L (ref 135–145)
TOTAL PROTEIN: 6.5 GM/DL (ref 6.4–8.2)

## 2018-03-27 ENCOUNTER — HOSPITAL ENCOUNTER (OUTPATIENT)
Dept: OTHER | Age: 63
Discharge: HOME | End: 2018-03-27
Attending: INTERNAL MEDICINE | Admitting: INTERNAL MEDICINE

## 2018-03-27 LAB
ALBUMIN SERPL-MCNC: 4.1 GM/DL (ref 3.4–5)
ALP BLD-CCNC: 56 IU/L (ref 40–128)
ALT SERPL-CCNC: 16 U/L (ref 10–40)
ANION GAP SERPL CALCULATED.3IONS-SCNC: 10 MMOL/L (ref 4–16)
AST SERPL-CCNC: 20 IU/L (ref 15–37)
BILIRUB SERPL-MCNC: 0.5 MG/DL (ref 0–1)
BUN BLDV-MCNC: 15 MG/DL (ref 6–23)
CALCIUM SERPL-MCNC: 9.1 MG/DL (ref 8.3–10.6)
CHLORIDE BLD-SCNC: 92 MMOL/L (ref 99–110)
CO2: 28 MMOL/L (ref 21–32)
CREAT SERPL-MCNC: 0.9 MG/DL (ref 0.6–1.1)
GFR AFRICAN AMERICAN: >60 ML/MIN/1.73M2
GFR NON-AFRICAN AMERICAN: >60 ML/MIN/1.73M2
GLUCOSE BLD-MCNC: 116 MG/DL (ref 70–99)
IGA: 62 MG/DL (ref 69–382)
IGG,SERUM: 1136 MG/DL (ref 723–1685)
IGM,SERUM: 66 MG/DL (ref 62–277)
POTASSIUM SERPL-SCNC: 3.8 MMOL/L (ref 3.5–5.1)
SODIUM BLD-SCNC: 130 MMOL/L (ref 135–145)
TOTAL PROTEIN: 6.5 GM/DL (ref 6.4–8.2)

## 2018-03-28 LAB
ALBUMIN ELP: 3.6 GM/DL (ref 3.2–5.6)
ALPHA-1-GLOBULIN: 0.2 GM/DL (ref 0.1–0.4)
ALPHA-2-GLOBULIN: 0.7 GM/DL (ref 0.4–1.2)
BETA GLOBULIN: 0.8 GM/DL (ref 0.5–1.3)
GAMMA GLOBULIN: 1.2 GM/DL (ref 0.5–1.6)
IMMUNOFIXATION ELECTROPHORESIS 1: NORMAL
TOTAL PROTEIN: 6.5 GM/DL (ref 6.4–8.2)

## 2018-03-29 LAB — BETA-2 MICROGLOBULIN: 1.9

## 2018-03-30 LAB
KAPPA QUANT FREE LIGHT CHAINS: 1.91
KAPPA/LAMBDA FREE LIGHT CHAIN RATIO: 2.51
LAMBDA FREE LIGHT CHAINS URINE/ VOL: 0.76

## 2018-04-04 ENCOUNTER — OFFICE VISIT (OUTPATIENT)
Dept: INTERNAL MEDICINE CLINIC | Age: 63
End: 2018-04-04

## 2018-04-04 VITALS
DIASTOLIC BLOOD PRESSURE: 78 MMHG | SYSTOLIC BLOOD PRESSURE: 138 MMHG | BODY MASS INDEX: 27.88 KG/M2 | RESPIRATION RATE: 12 BRPM | OXYGEN SATURATION: 98 % | WEIGHT: 165 LBS | HEART RATE: 72 BPM

## 2018-04-04 DIAGNOSIS — M54.41 CHRONIC MIDLINE LOW BACK PAIN WITH RIGHT-SIDED SCIATICA: ICD-10-CM

## 2018-04-04 DIAGNOSIS — G89.29 CHRONIC MIDLINE LOW BACK PAIN WITH RIGHT-SIDED SCIATICA: ICD-10-CM

## 2018-04-04 DIAGNOSIS — I10 ESSENTIAL HYPERTENSION: Primary | ICD-10-CM

## 2018-04-04 DIAGNOSIS — J30.1 CHRONIC ALLERGIC RHINITIS DUE TO POLLEN, UNSPECIFIED SEASONALITY: ICD-10-CM

## 2018-04-04 DIAGNOSIS — E78.2 MIXED HYPERLIPIDEMIA: ICD-10-CM

## 2018-04-04 DIAGNOSIS — C90.01 MULTIPLE MYELOMA IN REMISSION (HCC): ICD-10-CM

## 2018-04-04 PROCEDURE — 99214 OFFICE O/P EST MOD 30 MIN: CPT | Performed by: FAMILY MEDICINE

## 2018-04-04 RX ORDER — AZELASTINE 1 MG/ML
2 SPRAY, METERED NASAL 2 TIMES DAILY
Qty: 1 BOTTLE | Refills: 3 | Status: SHIPPED | OUTPATIENT
Start: 2018-04-04 | End: 2020-01-07

## 2018-04-04 RX ORDER — ATENOLOL AND CHLORTHALIDONE TABLET 50; 25 MG/1; MG/1
0.5 TABLET ORAL DAILY
Qty: 45 TABLET | Refills: 3 | Status: SHIPPED | OUTPATIENT
Start: 2018-04-04 | End: 2020-01-07 | Stop reason: SINTOL

## 2018-04-04 RX ORDER — IBUPROFEN 400 MG/1
400 TABLET ORAL EVERY 6 HOURS PRN
Qty: 120 TABLET | Refills: 3 | Status: SHIPPED | OUTPATIENT
Start: 2018-04-04

## 2018-04-04 ASSESSMENT — PATIENT HEALTH QUESTIONNAIRE - PHQ9
SUM OF ALL RESPONSES TO PHQ9 QUESTIONS 1 & 2: 0
2. FEELING DOWN, DEPRESSED OR HOPELESS: 0
1. LITTLE INTEREST OR PLEASURE IN DOING THINGS: 0
SUM OF ALL RESPONSES TO PHQ QUESTIONS 1-9: 0

## 2018-04-08 ASSESSMENT — ENCOUNTER SYMPTOMS
BLURRED VISION: 0
VOMITING: 0
COUGH: 0
SHORTNESS OF BREATH: 0
BACK PAIN: 0
SINUS PRESSURE: 0
DIARRHEA: 0
EYE DISCHARGE: 0
BLOOD IN STOOL: 0
SORE THROAT: 0
NAUSEA: 0

## 2018-04-10 ENCOUNTER — HOSPITAL ENCOUNTER (OUTPATIENT)
Dept: OTHER | Age: 63
Discharge: HOME | End: 2018-04-10
Attending: INTERNAL MEDICINE | Admitting: INTERNAL MEDICINE

## 2018-04-10 LAB
ALBUMIN SERPL-MCNC: 4 GM/DL (ref 3.4–5)
ALP BLD-CCNC: 54 IU/L (ref 40–128)
ALT SERPL-CCNC: 14 U/L (ref 10–40)
ANION GAP SERPL CALCULATED.3IONS-SCNC: 13 MMOL/L (ref 4–16)
AST SERPL-CCNC: 21 IU/L (ref 15–37)
BILIRUB SERPL-MCNC: 0.5 MG/DL (ref 0–1)
BUN BLDV-MCNC: 16 MG/DL (ref 6–23)
CALCIUM SERPL-MCNC: 8.8 MG/DL (ref 8.3–10.6)
CHLORIDE BLD-SCNC: 93 MMOL/L (ref 99–110)
CO2: 27 MMOL/L (ref 21–32)
CREAT SERPL-MCNC: 0.9 MG/DL (ref 0.6–1.1)
GFR AFRICAN AMERICAN: >60 ML/MIN/1.73M2
GFR NON-AFRICAN AMERICAN: >60 ML/MIN/1.73M2
GLUCOSE BLD-MCNC: 86 MG/DL (ref 70–99)
POTASSIUM SERPL-SCNC: 3.9 MMOL/L (ref 3.5–5.1)
SODIUM BLD-SCNC: 133 MMOL/L (ref 135–145)
TOTAL PROTEIN: 6.4 GM/DL (ref 6.4–8.2)

## 2018-05-15 ENCOUNTER — HOSPITAL ENCOUNTER (OUTPATIENT)
Dept: OTHER | Age: 63
Discharge: HOME | End: 2018-05-15
Attending: INTERNAL MEDICINE | Admitting: INTERNAL MEDICINE

## 2018-05-15 LAB
ALBUMIN SERPL-MCNC: 4.4 GM/DL (ref 3.4–5)
ALP BLD-CCNC: 60 IU/L (ref 40–129)
ALT SERPL-CCNC: 21 U/L (ref 10–40)
ANION GAP SERPL CALCULATED.3IONS-SCNC: 9 MMOL/L (ref 4–16)
AST SERPL-CCNC: 25 IU/L (ref 15–37)
BILIRUB SERPL-MCNC: 0.5 MG/DL (ref 0–1)
BUN BLDV-MCNC: 14 MG/DL (ref 6–23)
CALCIUM SERPL-MCNC: 9.6 MG/DL (ref 8.3–10.6)
CHLORIDE BLD-SCNC: 94 MMOL/L (ref 99–110)
CO2: 32 MMOL/L (ref 21–32)
CREAT SERPL-MCNC: 1 MG/DL (ref 0.6–1.1)
GFR AFRICAN AMERICAN: >60 ML/MIN/1.73M2
GFR NON-AFRICAN AMERICAN: 56 ML/MIN/1.73M2
GLUCOSE BLD-MCNC: 84 MG/DL (ref 70–99)
IGA: 70 MG/DL (ref 69–382)
IGG,SERUM: 1370 MG/DL (ref 723–1685)
IGM,SERUM: 71 MG/DL (ref 62–277)
POTASSIUM SERPL-SCNC: 3.8 MMOL/L (ref 3.5–5.1)
SODIUM BLD-SCNC: 135 MMOL/L (ref 135–145)
TOTAL PROTEIN: 7 GM/DL (ref 6.4–8.2)

## 2018-05-16 LAB
ALBUMIN ELP: 3.8 GM/DL (ref 3.2–5.6)
ALPHA-1-GLOBULIN: 0.2 GM/DL (ref 0.1–0.4)
ALPHA-2-GLOBULIN: 0.7 GM/DL (ref 0.4–1.2)
BETA GLOBULIN: 0.9 GM/DL (ref 0.5–1.3)
GAMMA GLOBULIN: 1.3 GM/DL (ref 0.5–1.6)
IMMUNOFIXATION ELECTROPHORESIS 1: NORMAL
KAPPA QUANT FREE LIGHT CHAINS: 2.04
KAPPA/LAMBDA FREE LIGHT CHAIN RATIO: 2.62
LAMBDA FREE LIGHT CHAINS URINE/ VOL: 0.78
TOTAL PROTEIN: 7 GM/DL (ref 6.4–8.2)

## 2018-06-12 ENCOUNTER — HOSPITAL ENCOUNTER (OUTPATIENT)
Dept: OTHER | Age: 63
Discharge: HOME | End: 2018-06-12
Attending: INTERNAL MEDICINE | Admitting: INTERNAL MEDICINE

## 2018-06-12 LAB
ALBUMIN SERPL-MCNC: 4.3 GM/DL (ref 3.4–5)
ALP BLD-CCNC: 54 IU/L (ref 40–128)
ALT SERPL-CCNC: 19 U/L (ref 10–40)
ANION GAP SERPL CALCULATED.3IONS-SCNC: 12 MMOL/L (ref 4–16)
AST SERPL-CCNC: 22 IU/L (ref 15–37)
BILIRUB SERPL-MCNC: 0.5 MG/DL (ref 0–1)
BUN BLDV-MCNC: 11 MG/DL (ref 6–23)
CALCIUM SERPL-MCNC: 9.2 MG/DL (ref 8.3–10.6)
CHLORIDE BLD-SCNC: 94 MMOL/L (ref 99–110)
CO2: 27 MMOL/L (ref 21–32)
CREAT SERPL-MCNC: 0.9 MG/DL (ref 0.6–1.1)
GFR AFRICAN AMERICAN: >60 ML/MIN/1.73M2
GFR NON-AFRICAN AMERICAN: >60 ML/MIN/1.73M2
GLUCOSE BLD-MCNC: 105 MG/DL (ref 70–99)
IGA: 62 MG/DL (ref 69–382)
IGG,SERUM: 1353 MG/DL (ref 723–1685)
IGM,SERUM: 65 MG/DL (ref 62–277)
POTASSIUM SERPL-SCNC: 3.9 MMOL/L (ref 3.5–5.1)
SODIUM BLD-SCNC: 133 MMOL/L (ref 135–145)
TOTAL PROTEIN: 7.1 GM/DL (ref 6.4–8.2)

## 2018-06-13 LAB
ALBUMIN ELP: 3.9 GM/DL (ref 3.2–5.6)
ALPHA-1-GLOBULIN: 0.2 GM/DL (ref 0.1–0.4)
ALPHA-2-GLOBULIN: 0.7 GM/DL (ref 0.4–1.2)
BETA GLOBULIN: 0.9 GM/DL (ref 0.5–1.3)
BETA-2 MICROGLOBULIN: 1.8
GAMMA GLOBULIN: 1.4 GM/DL (ref 0.5–1.6)
IMMUNOFIXATION ELECTROPHORESIS 1: NORMAL
TOTAL PROTEIN: 7.1 GM/DL (ref 6.4–8.2)

## 2018-06-14 LAB
KAPPA QUANT FREE LIGHT CHAINS: 1.76
KAPPA/LAMBDA FREE LIGHT CHAIN RATIO: 2.12
LAMBDA FREE LIGHT CHAINS URINE/ VOL: 0.83

## 2018-07-05 ENCOUNTER — HOSPITAL ENCOUNTER (OUTPATIENT)
Dept: GENERAL RADIOLOGY | Age: 63
Discharge: HOME | End: 2018-07-05
Attending: FAMILY MEDICINE | Admitting: FAMILY MEDICINE

## 2018-07-05 LAB
ALBUMIN SERPL-MCNC: 4.2 GM/DL (ref 3.4–5)
ALP BLD-CCNC: 59 IU/L (ref 40–128)
ALT SERPL-CCNC: 18 U/L (ref 10–40)
ANION GAP SERPL CALCULATED.3IONS-SCNC: 13 MMOL/L (ref 4–16)
AST SERPL-CCNC: 22 IU/L (ref 15–37)
BASOPHILS ABSOLUTE: 0 K/CU MM
BASOPHILS RELATIVE PERCENT: 0.7 % (ref 0–1)
BILIRUB SERPL-MCNC: 0.5 MG/DL (ref 0–1)
BUN BLDV-MCNC: 11 MG/DL (ref 6–23)
CALCIUM SERPL-MCNC: 9.6 MG/DL (ref 8.3–10.6)
CHLORIDE BLD-SCNC: 92 MMOL/L (ref 99–110)
CHOLESTEROL: 211 MG/DL
CO2: 27 MMOL/L (ref 21–32)
CREAT SERPL-MCNC: 0.9 MG/DL (ref 0.6–1.1)
DIFFERENTIAL TYPE: ABNORMAL
EOSINOPHILS ABSOLUTE: 0 K/CU MM
EOSINOPHILS RELATIVE PERCENT: 1 % (ref 0–3)
GFR AFRICAN AMERICAN: >60 ML/MIN/1.73M2
GFR NON-AFRICAN AMERICAN: >60 ML/MIN/1.73M2
GLUCOSE BLD-MCNC: 104 MG/DL (ref 70–99)
HCT VFR BLD CALC: 39.6 % (ref 37–47)
HDLC SERPL-MCNC: 80 MG/DL
HEMOGLOBIN: 12.9 GM/DL (ref 12.5–16)
IMMATURE NEUTROPHIL %: 0.2 % (ref 0–0.43)
LDL CHOLESTEROL DIRECT: 144 MG/DL
LYMPHOCYTES ABSOLUTE: 0.9 K/CU MM
LYMPHOCYTES RELATIVE PERCENT: 21.6 % (ref 24–44)
MCH RBC QN AUTO: 31.5 PG (ref 27–31)
MCHC RBC AUTO-ENTMCNC: 32.6 % (ref 32–36)
MCV RBC AUTO: 96.6 FL (ref 78–100)
MONOCYTES ABSOLUTE: 0.4 K/CU MM
MONOCYTES RELATIVE PERCENT: 9.7 % (ref 0–4)
NUCLEATED RBC %: 0 %
PDW BLD-RTO: 12.6 % (ref 11.7–14.9)
PLATELET # BLD: 242 K/CU MM (ref 140–440)
PMV BLD AUTO: 10.7 FL (ref 7.5–11.1)
POTASSIUM SERPL-SCNC: 4.4 MMOL/L (ref 3.5–5.1)
RBC # BLD: 4.1 M/CU MM (ref 4.2–5.4)
SEGMENTED NEUTROPHILS ABSOLUTE COUNT: 2.8 K/CU MM
SEGMENTED NEUTROPHILS RELATIVE PERCENT: 66.8 % (ref 36–66)
SODIUM BLD-SCNC: 132 MMOL/L (ref 135–145)
TOTAL IMMATURE NEUTOROPHIL: 0.01 K/CU MM
TOTAL NUCLEATED RBC: 0 K/CU MM
TOTAL PROTEIN: 7.3 GM/DL (ref 6.4–8.2)
TRIGL SERPL-MCNC: 67 MG/DL
TSH HIGH SENSITIVITY: 2.12 UIU/ML (ref 0.27–4.2)
WBC # BLD: 4.1 K/CU MM (ref 4–10.5)

## 2018-07-11 ENCOUNTER — OFFICE VISIT (OUTPATIENT)
Dept: INTERNAL MEDICINE CLINIC | Age: 63
End: 2018-07-11

## 2018-07-11 VITALS
RESPIRATION RATE: 16 BRPM | HEIGHT: 64 IN | SYSTOLIC BLOOD PRESSURE: 128 MMHG | HEART RATE: 66 BPM | OXYGEN SATURATION: 98 % | WEIGHT: 165.8 LBS | BODY MASS INDEX: 28.31 KG/M2 | DIASTOLIC BLOOD PRESSURE: 74 MMHG

## 2018-07-11 DIAGNOSIS — C90.01 MULTIPLE MYELOMA IN REMISSION (HCC): ICD-10-CM

## 2018-07-11 DIAGNOSIS — G89.29 CHRONIC PAIN OF RIGHT KNEE: ICD-10-CM

## 2018-07-11 DIAGNOSIS — I10 ESSENTIAL HYPERTENSION: Primary | ICD-10-CM

## 2018-07-11 DIAGNOSIS — M25.561 CHRONIC PAIN OF RIGHT KNEE: ICD-10-CM

## 2018-07-11 DIAGNOSIS — E78.2 MIXED HYPERLIPIDEMIA: ICD-10-CM

## 2018-07-11 PROCEDURE — 99214 OFFICE O/P EST MOD 30 MIN: CPT | Performed by: FAMILY MEDICINE

## 2018-07-11 RX ORDER — ATENOLOL AND CHLORTHALIDONE TABLET 50; 25 MG/1; MG/1
0.5 TABLET ORAL DAILY
Qty: 45 TABLET | Refills: 3 | Status: CANCELLED | OUTPATIENT
Start: 2018-07-11

## 2018-07-11 ASSESSMENT — ENCOUNTER SYMPTOMS
DIARRHEA: 0
SORE THROAT: 0
COUGH: 0
NAUSEA: 0
BLOOD IN STOOL: 0
VOMITING: 0
SHORTNESS OF BREATH: 0
BACK PAIN: 0
SINUS PRESSURE: 0
EYE DISCHARGE: 0

## 2018-07-11 NOTE — PROGRESS NOTES
09/09/2015     Lab Results   Component Value Date    LDLCALC 79 09/09/2015     Lab Results   Component Value Date    LABVLDL 14 09/09/2015           Lab Results   Component Value Date    LABA1C 5.6 03/02/2017       Physical Exam   Constitution - Alert and oriented X3 , well nourished male appears stated age  [de-identified]- Head- normocephalic and atraumatic. Eyes  right eye normal conjunctivae, left eye normal conjunctivae Ears ear outer structure normal, left ear outer structure normal. Throat - no erythema noted  Neck- No lymphadenopathy, neck supple   Respiratory - Breath sounds clear to ausculation, no SOB  Cardiovascular - Normal rate and rhythm, pulses normal and no pedal edema noted  Abdomen- Normal bowel sounds in all four quadrants, no masses or tenderness with palpation noted  Musculoskeletal  Normal range of motion. Right knee pain  Psychiatric/ Behavioral  Normal mood and thought pattern. Denies suicidal or homicidal idealation. No results found for this visit on 07/11/18. ASSESSMENT/ PLAN:  Hyperlipidemia - Well controlled will increase Omega 3 fatty acids to BID and repeat labs in 3 months  Hypertension - Well controlled will continue current treatment with Tenoretic  Musculoskeletal - Right knee pain, patient will contact [address] [place] [address] for treatment  Multiple Myeloma- Will continue to follow up with Dr. Elder Lira    Problem List     Chronic pain of right knee     Patient will follow-up with Dr. Nelia Maldonado for possible injection and to address her knee pain. HTN (hypertension) - Primary     Patient with well controlled HTN. Continue current medications. Will continue current treatment plan. Relevant Medications    atenolol-chlorthalidone (TENORETIC) 50-25 MG per tablet    Mixed hyperlipidemia     Patient will increase omega 3 to twice daily. We'll repeat her lipid panel in 3 months.          Relevant Medications    atenolol-chlorthalidone (TENORETIC) 50-25 MG per tablet    Other

## 2018-07-11 NOTE — PROGRESS NOTES
04/04/18 165 lb (74.8 kg)   01/03/18 165 lb (74.8 kg)       Lab Results   Component Value Date    CHOL 211 (H) 07/05/2018    CHOL 243 (H) 03/02/2017    CHOL 128 09/09/2015     Lab Results   Component Value Date    TRIG 67 07/05/2018    TRIG 73 03/02/2017    TRIG 72 09/09/2015     Lab Results   Component Value Date    HDL 80 07/05/2018    HDL 85 03/02/2017    HDL 35 (L) 09/09/2015     Lab Results   Component Value Date    LDLCALC 79 09/09/2015     Lab Results   Component Value Date    LABVLDL 14 09/09/2015           Lab Results   Component Value Date    LABA1C 5.6 03/02/2017       Physical Exam   Constitutional: She is oriented to person, place, and time. She appears well-developed and well-nourished. HENT:   Head: Normocephalic and atraumatic. Right Ear: External ear normal.   Left Ear: External ear normal.   Eyes: Conjunctivae and EOM are normal. Pupils are equal, round, and reactive to light. No scleral icterus. Neck: Normal range of motion. Neck supple. Cardiovascular: Normal rate, regular rhythm, normal heart sounds and intact distal pulses. Exam reveals no gallop and no friction rub. No murmur heard. Pulmonary/Chest: Effort normal and breath sounds normal. No respiratory distress. She has no wheezes. She has no rales. Musculoskeletal: Normal range of motion. Neurological: She is alert and oriented to person, place, and time. Skin: Skin is warm and dry. No rash noted. Psychiatric: She has a normal mood and affect. Her behavior is normal. Judgment and thought content normal.         ASSESSMENT/ PLAN:    Problem List     Chronic pain of right knee     Patient will follow-up with Dr. Abi Villafana for possible injection and to address her knee pain. HTN (hypertension) - Primary     Patient with well controlled HTN. Continue current medications. Will continue current treatment plan.           Relevant Medications    atenolol-chlorthalidone (TENORETIC) 50-25 MG per tablet    Mixed

## 2018-07-18 ENCOUNTER — HOSPITAL ENCOUNTER (OUTPATIENT)
Dept: OTHER | Age: 63
Discharge: HOME | End: 2018-07-18
Attending: INTERNAL MEDICINE | Admitting: INTERNAL MEDICINE

## 2018-07-18 LAB
ALBUMIN SERPL-MCNC: 4.3 GM/DL (ref 3.4–5)
ALP BLD-CCNC: 51 IU/L (ref 40–129)
ALT SERPL-CCNC: 15 U/L (ref 10–40)
ANION GAP SERPL CALCULATED.3IONS-SCNC: 10 MMOL/L (ref 4–16)
AST SERPL-CCNC: 15 IU/L (ref 15–37)
BILIRUB SERPL-MCNC: 0.6 MG/DL (ref 0–1)
BUN BLDV-MCNC: 16 MG/DL (ref 6–23)
CALCIUM SERPL-MCNC: 9.3 MG/DL (ref 8.3–10.6)
CHLORIDE BLD-SCNC: 88 MMOL/L (ref 99–110)
CO2: 30 MMOL/L (ref 21–32)
CREAT SERPL-MCNC: 1 MG/DL (ref 0.6–1.1)
GFR AFRICAN AMERICAN: >60 ML/MIN/1.73M2
GFR NON-AFRICAN AMERICAN: 56 ML/MIN/1.73M2
GLUCOSE BLD-MCNC: 114 MG/DL (ref 70–99)
IGA: 63 MG/DL (ref 69–382)
IGG,SERUM: 1543 MG/DL (ref 723–1685)
IGM,SERUM: 75 MG/DL (ref 62–277)
POTASSIUM SERPL-SCNC: 4 MMOL/L (ref 3.5–5.1)
SODIUM BLD-SCNC: 128 MMOL/L (ref 135–145)
TOTAL PROTEIN: 7.1 GM/DL (ref 6.4–8.2)

## 2018-07-19 LAB — BETA-2 MICROGLOBULIN: 1.9

## 2018-07-20 LAB
ALBUMIN ELP: 3.9 GM/DL (ref 3.2–5.6)
ALPHA-1-GLOBULIN: 0.2 GM/DL (ref 0.1–0.4)
ALPHA-2-GLOBULIN: 0.7 GM/DL (ref 0.4–1.2)
BETA GLOBULIN: 0.8 GM/DL (ref 0.5–1.3)
GAMMA GLOBULIN: 1.5 GM/DL (ref 0.5–1.6)
KAPPA QUANT FREE LIGHT CHAINS: 1.96
KAPPA/LAMBDA FREE LIGHT CHAIN RATIO: 2.61
LAMBDA FREE LIGHT CHAINS URINE/ VOL: 0.75
TOTAL PROTEIN: 7.1 GM/DL (ref 6.4–8.2)

## 2018-08-31 ENCOUNTER — HOSPITAL ENCOUNTER (OUTPATIENT)
Dept: OTHER | Age: 63
Discharge: HOME | End: 2018-08-31
Attending: INTERNAL MEDICINE | Admitting: INTERNAL MEDICINE

## 2018-08-31 LAB
ALBUMIN SERPL-MCNC: 4.4 GM/DL (ref 3.4–5)
ALP BLD-CCNC: 57 IU/L (ref 40–129)
ALT SERPL-CCNC: 21 U/L (ref 10–40)
ANION GAP SERPL CALCULATED.3IONS-SCNC: 12 MMOL/L (ref 4–16)
AST SERPL-CCNC: 27 IU/L (ref 15–37)
BILIRUB SERPL-MCNC: 0.4 MG/DL (ref 0–1)
BUN BLDV-MCNC: 12 MG/DL (ref 6–23)
CALCIUM SERPL-MCNC: 9.7 MG/DL (ref 8.3–10.6)
CHLORIDE BLD-SCNC: 91 MMOL/L (ref 99–110)
CO2: 29 MMOL/L (ref 21–32)
CREAT SERPL-MCNC: 0.8 MG/DL (ref 0.6–1.1)
GFR AFRICAN AMERICAN: >60 ML/MIN/1.73M2
GFR NON-AFRICAN AMERICAN: >60 ML/MIN/1.73M2
GLUCOSE BLD-MCNC: 85 MG/DL (ref 70–99)
IGA: 58 MG/DL (ref 69–382)
IGG,SERUM: 1984 MG/DL (ref 723–1685)
IGM,SERUM: 71 MG/DL (ref 62–277)
POTASSIUM SERPL-SCNC: 4.4 MMOL/L (ref 3.5–5.1)
SODIUM BLD-SCNC: 132 MMOL/L (ref 135–145)
TOTAL PROTEIN: 7.6 GM/DL (ref 6.4–8.2)

## 2018-09-02 LAB — BETA-2 MICROGLOBULIN: 2.1

## 2018-09-03 LAB
KAPPA QUANT FREE LIGHT CHAINS: 2.37
KAPPA/LAMBDA FREE LIGHT CHAIN RATIO: 2.52
LAMBDA FREE LIGHT CHAINS URINE/ VOL: 0.94

## 2018-09-04 LAB
ALBUMIN ELP: 4.1 GM/DL (ref 3.2–5.6)
ALPHA-1-GLOBULIN: 0.2 GM/DL (ref 0.1–0.4)
ALPHA-2-GLOBULIN: 0.7 GM/DL (ref 0.4–1.2)
BETA GLOBULIN: 0.8 GM/DL (ref 0.5–1.3)
GAMMA GLOBULIN: 1.8 GM/DL (ref 0.5–1.6)
TOTAL PROTEIN: 7.6 GM/DL (ref 6.4–8.2)

## 2018-09-21 ENCOUNTER — HOSPITAL ENCOUNTER (OUTPATIENT)
Dept: OTHER | Age: 63
Discharge: HOME | End: 2018-09-21
Attending: INTERNAL MEDICINE | Admitting: INTERNAL MEDICINE

## 2018-09-21 LAB
ALBUMIN SERPL-MCNC: 4.2 GM/DL (ref 3.4–5)
ALP BLD-CCNC: 61 IU/L (ref 40–128)
ALT SERPL-CCNC: 22 U/L (ref 10–40)
ANION GAP SERPL CALCULATED.3IONS-SCNC: 10 MMOL/L (ref 4–16)
AST SERPL-CCNC: 27 IU/L (ref 15–37)
BILIRUB SERPL-MCNC: 0.6 MG/DL (ref 0–1)
BUN BLDV-MCNC: 14 MG/DL (ref 6–23)
CALCIUM SERPL-MCNC: 9.4 MG/DL (ref 8.3–10.6)
CHLORIDE BLD-SCNC: 93 MMOL/L (ref 99–110)
CO2: 29 MMOL/L (ref 21–32)
CREAT SERPL-MCNC: 0.9 MG/DL (ref 0.6–1.1)
GFR AFRICAN AMERICAN: >60 ML/MIN/1.73M2
GFR NON-AFRICAN AMERICAN: >60 ML/MIN/1.73M2
GLUCOSE BLD-MCNC: 86 MG/DL (ref 70–99)
IGA: 57 MG/DL (ref 69–382)
IGG,SERUM: 2310 MG/DL (ref 723–1685)
IGM,SERUM: 69 MG/DL (ref 62–277)
POTASSIUM SERPL-SCNC: 4.2 MMOL/L (ref 3.5–5.1)
SODIUM BLD-SCNC: 132 MMOL/L (ref 135–145)
TOTAL PROTEIN: 7.6 GM/DL (ref 6.4–8.2)

## 2018-09-22 LAB — BETA-2 MICROGLOBULIN: 2.2

## 2018-09-24 LAB
ALBUMIN ELP: 4 GM/DL (ref 3.2–5.6)
ALPHA-1-GLOBULIN: 0.2 GM/DL (ref 0.1–0.4)
ALPHA-2-GLOBULIN: 0.7 GM/DL (ref 0.4–1.2)
BETA GLOBULIN: 0.8 GM/DL (ref 0.5–1.3)
GAMMA GLOBULIN: 1.9 GM/DL (ref 0.5–1.6)
TOTAL PROTEIN: 7.6 GM/DL (ref 6.4–8.2)

## 2018-09-27 LAB
KAPPA QUANT FREE LIGHT CHAINS: 3.03
KAPPA/LAMBDA FREE LIGHT CHAIN RATIO: 3.26
LAMBDA FREE LIGHT CHAINS URINE/ VOL: 0.93

## 2018-10-04 ENCOUNTER — HOSPITAL ENCOUNTER (OUTPATIENT)
Age: 63
Discharge: HOME | End: 2018-10-04
Payer: MEDICARE

## 2018-10-04 ENCOUNTER — HOSPITAL ENCOUNTER (OUTPATIENT)
Dept: GENERAL RADIOLOGY | Age: 63
Discharge: HOME | End: 2018-10-04
Payer: MEDICARE

## 2018-10-04 DIAGNOSIS — R07.81 RIB PAIN ON RIGHT SIDE: ICD-10-CM

## 2018-10-04 DIAGNOSIS — C90.00 MULTIPLE MYELOMA, REMISSION STATUS UNSPECIFIED (HCC): ICD-10-CM

## 2018-10-04 PROCEDURE — 77075 RADEX OSSEOUS SURVEY COMPL: CPT

## 2018-11-20 ENCOUNTER — HOSPITAL ENCOUNTER (OUTPATIENT)
Age: 63
Setting detail: SPECIMEN
Discharge: HOME | End: 2018-11-20
Payer: MEDICARE

## 2018-11-20 LAB
ALBUMIN SERPL-MCNC: 4 GM/DL (ref 3.4–5)
ALP BLD-CCNC: 55 IU/L (ref 40–129)
ALT SERPL-CCNC: 20 U/L (ref 10–40)
ANION GAP SERPL CALCULATED.3IONS-SCNC: 10 MMOL/L (ref 4–16)
AST SERPL-CCNC: 20 IU/L (ref 15–37)
BILIRUB SERPL-MCNC: 0.6 MG/DL (ref 0–1)
BUN BLDV-MCNC: 11 MG/DL (ref 6–23)
CALCIUM SERPL-MCNC: 9.4 MG/DL (ref 8.3–10.6)
CHLORIDE BLD-SCNC: 90 MMOL/L (ref 99–110)
CO2: 30 MMOL/L (ref 21–32)
CREAT SERPL-MCNC: 0.9 MG/DL (ref 0.6–1.1)
ERYTHROCYTE SEDIMENTATION RATE: 31 MM/HR (ref 0–30)
GFR AFRICAN AMERICAN: >60 ML/MIN/1.73M2
GFR NON-AFRICAN AMERICAN: >60 ML/MIN/1.73M2
GLUCOSE BLD-MCNC: 108 MG/DL (ref 70–99)
IGA: 57 MG/DL (ref 69–382)
IGG,SERUM: 2916 MG/DL (ref 723–1685)
IGM,SERUM: 60 MG/DL (ref 62–277)
LACTATE DEHYDROGENASE: 147 IU/L (ref 120–246)
POTASSIUM SERPL-SCNC: 3.9 MMOL/L (ref 3.5–5.1)
SODIUM BLD-SCNC: 130 MMOL/L (ref 135–145)
TOTAL PROTEIN: 8.1 GM/DL (ref 6.4–8.2)

## 2018-11-20 PROCEDURE — 85652 RBC SED RATE AUTOMATED: CPT

## 2018-11-20 PROCEDURE — 83615 LACTATE (LD) (LDH) ENZYME: CPT

## 2018-11-20 PROCEDURE — 80053 COMPREHEN METABOLIC PANEL: CPT

## 2018-11-20 PROCEDURE — 84165 PROTEIN E-PHORESIS SERUM: CPT

## 2018-11-20 PROCEDURE — 86320 SERUM IMMUNOELECTROPHORESIS: CPT

## 2018-11-20 PROCEDURE — 82784 ASSAY IGA/IGD/IGG/IGM EACH: CPT

## 2018-11-20 PROCEDURE — 83883 ASSAY NEPHELOMETRY NOT SPEC: CPT

## 2018-11-21 LAB
ALBUMIN ELP: 3.7 GM/DL (ref 3.2–5.6)
ALPHA-1-GLOBULIN: 0.2 GM/DL (ref 0.1–0.4)
ALPHA-2-GLOBULIN: 0.7 GM/DL (ref 0.4–1.2)
BETA GLOBULIN: 0.9 GM/DL (ref 0.5–1.3)
GAMMA GLOBULIN: 2.5 GM/DL (ref 0.5–1.6)
TOTAL PROTEIN: 8.1 GM/DL (ref 6.4–8.2)

## 2018-11-22 LAB
KAPPA QUANT FREE LIGHT CHAINS: 4.5
KAPPA/LAMBDA FREE LIGHT CHAIN RATIO: 17.31
LAMBDA FREE LIGHT CHAINS URINE/ VOL: 0.26

## 2018-12-20 ENCOUNTER — HOSPITAL ENCOUNTER (OUTPATIENT)
Age: 63
Setting detail: SPECIMEN
Discharge: HOME | End: 2018-12-20
Payer: MEDICARE

## 2018-12-20 LAB
ALBUMIN SERPL-MCNC: 4 GM/DL (ref 3.4–5)
ALP BLD-CCNC: 56 IU/L (ref 40–128)
ALT SERPL-CCNC: 23 U/L (ref 10–40)
ANION GAP SERPL CALCULATED.3IONS-SCNC: 8 MMOL/L (ref 4–16)
AST SERPL-CCNC: 24 IU/L (ref 15–37)
BILIRUB SERPL-MCNC: 0.6 MG/DL (ref 0–1)
BUN BLDV-MCNC: 16 MG/DL (ref 6–23)
CALCIUM SERPL-MCNC: 8.9 MG/DL (ref 8.3–10.6)
CHLORIDE BLD-SCNC: 92 MMOL/L (ref 99–110)
CO2: 32 MMOL/L (ref 21–32)
CREAT SERPL-MCNC: 0.9 MG/DL (ref 0.6–1.1)
GFR AFRICAN AMERICAN: >60 ML/MIN/1.73M2
GFR NON-AFRICAN AMERICAN: >60 ML/MIN/1.73M2
GLUCOSE BLD-MCNC: 101 MG/DL (ref 70–99)
POTASSIUM SERPL-SCNC: 4.2 MMOL/L (ref 3.5–5.1)
SODIUM BLD-SCNC: 132 MMOL/L (ref 135–145)
TOTAL PROTEIN: 7.3 GM/DL (ref 6.4–8.2)

## 2018-12-20 PROCEDURE — 80053 COMPREHEN METABOLIC PANEL: CPT

## 2019-01-22 ENCOUNTER — HOSPITAL ENCOUNTER (OUTPATIENT)
Age: 64
Setting detail: SPECIMEN
Discharge: HOME | End: 2019-01-22
Payer: MEDICARE

## 2019-01-22 LAB
ALBUMIN SERPL-MCNC: 4.5 GM/DL (ref 3.4–5)
ALP BLD-CCNC: 64 IU/L (ref 40–128)
ALT SERPL-CCNC: 24 U/L (ref 10–40)
ANION GAP SERPL CALCULATED.3IONS-SCNC: 10 MMOL/L (ref 4–16)
AST SERPL-CCNC: 23 IU/L (ref 15–37)
BILIRUB SERPL-MCNC: 0.5 MG/DL (ref 0–1)
BUN BLDV-MCNC: 11 MG/DL (ref 6–23)
CALCIUM SERPL-MCNC: 9.7 MG/DL (ref 8.3–10.6)
CHLORIDE BLD-SCNC: 92 MMOL/L (ref 99–110)
CO2: 33 MMOL/L (ref 21–32)
CREAT SERPL-MCNC: 1 MG/DL (ref 0.6–1.1)
GFR AFRICAN AMERICAN: >60 ML/MIN/1.73M2
GFR NON-AFRICAN AMERICAN: 56 ML/MIN/1.73M2
GLUCOSE BLD-MCNC: 86 MG/DL (ref 70–99)
IGA: 83 MG/DL (ref 69–382)
IGG,SERUM: 1315 MG/DL (ref 723–1685)
IGM,SERUM: 66 MG/DL (ref 62–277)
LACTATE DEHYDROGENASE: 162 IU/L (ref 120–246)
POTASSIUM SERPL-SCNC: 3.7 MMOL/L (ref 3.5–5.1)
SODIUM BLD-SCNC: 135 MMOL/L (ref 135–145)
TOTAL PROTEIN: 7.2 GM/DL (ref 6.4–8.2)

## 2019-01-22 PROCEDURE — 82784 ASSAY IGA/IGD/IGG/IGM EACH: CPT

## 2019-01-22 PROCEDURE — 83883 ASSAY NEPHELOMETRY NOT SPEC: CPT

## 2019-01-22 PROCEDURE — 83615 LACTATE (LD) (LDH) ENZYME: CPT

## 2019-01-22 PROCEDURE — 82232 ASSAY OF BETA-2 PROTEIN: CPT

## 2019-01-22 PROCEDURE — 80053 COMPREHEN METABOLIC PANEL: CPT

## 2019-01-22 PROCEDURE — 84165 PROTEIN E-PHORESIS SERUM: CPT

## 2019-01-22 PROCEDURE — 86320 SERUM IMMUNOELECTROPHORESIS: CPT

## 2019-01-23 LAB
ALBUMIN ELP: 3.9 GM/DL (ref 3.2–5.6)
ALPHA-1-GLOBULIN: 0.2 GM/DL (ref 0.1–0.4)
ALPHA-2-GLOBULIN: 0.7 GM/DL (ref 0.4–1.2)
BETA GLOBULIN: 0.9 GM/DL (ref 0.5–1.3)
GAMMA GLOBULIN: 1.5 GM/DL (ref 0.5–1.6)
TOTAL PROTEIN: 7.2 GM/DL (ref 6.4–8.2)

## 2019-01-24 LAB — BETA-2 MICROGLOBULIN: 2

## 2019-01-25 LAB
KAPPA QUANT FREE LIGHT CHAINS: 2.05
KAPPA/LAMBDA FREE LIGHT CHAIN RATIO: 1.2
LAMBDA FREE LIGHT CHAINS URINE/ VOL: 1.71

## 2019-04-23 ENCOUNTER — HOSPITAL ENCOUNTER (OUTPATIENT)
Age: 64
Setting detail: SPECIMEN
Discharge: HOME | End: 2019-04-23
Payer: MEDICARE

## 2019-04-23 LAB
ALBUMIN SERPL-MCNC: 4.2 GM/DL (ref 3.4–5)
ALP BLD-CCNC: 68 IU/L (ref 40–129)
ALT SERPL-CCNC: 21 U/L (ref 10–40)
ANION GAP SERPL CALCULATED.3IONS-SCNC: 10 MMOL/L (ref 4–16)
AST SERPL-CCNC: 21 IU/L (ref 15–37)
BILIRUB SERPL-MCNC: 0.4 MG/DL (ref 0–1)
BUN BLDV-MCNC: 12 MG/DL (ref 6–23)
CALCIUM SERPL-MCNC: 9.3 MG/DL (ref 8.3–10.6)
CHLORIDE BLD-SCNC: 93 MMOL/L (ref 99–110)
CO2: 34 MMOL/L (ref 21–32)
CREAT SERPL-MCNC: 0.9 MG/DL (ref 0.6–1.1)
ERYTHROCYTE SEDIMENTATION RATE: 9 MM/HR (ref 0–30)
GFR AFRICAN AMERICAN: >60 ML/MIN/1.73M2
GFR NON-AFRICAN AMERICAN: >60 ML/MIN/1.73M2
GLUCOSE BLD-MCNC: 98 MG/DL (ref 70–99)
IGA: 131 MG/DL (ref 69–382)
IGG,SERUM: 949 MG/DL (ref 723–1685)
IGM,SERUM: 81 MG/DL (ref 62–277)
LACTATE DEHYDROGENASE: 149 IU/L (ref 120–246)
POTASSIUM SERPL-SCNC: 3.4 MMOL/L (ref 3.5–5.1)
SODIUM BLD-SCNC: 137 MMOL/L (ref 135–145)
TOTAL PROTEIN: 6.4 GM/DL (ref 6.4–8.2)

## 2019-04-23 PROCEDURE — 82232 ASSAY OF BETA-2 PROTEIN: CPT

## 2019-04-23 PROCEDURE — 83883 ASSAY NEPHELOMETRY NOT SPEC: CPT

## 2019-04-23 PROCEDURE — 84165 PROTEIN E-PHORESIS SERUM: CPT

## 2019-04-23 PROCEDURE — 85652 RBC SED RATE AUTOMATED: CPT

## 2019-04-23 PROCEDURE — 86320 SERUM IMMUNOELECTROPHORESIS: CPT

## 2019-04-23 PROCEDURE — 83615 LACTATE (LD) (LDH) ENZYME: CPT

## 2019-04-23 PROCEDURE — 82784 ASSAY IGA/IGD/IGG/IGM EACH: CPT

## 2019-04-23 PROCEDURE — 80053 COMPREHEN METABOLIC PANEL: CPT

## 2019-04-25 LAB
BETA-2 MICROGLOBULIN: 1.9 MG/L (ref 1.1–2.4)
BETA-2 MICROGLOBULIN: NORMAL MG/L (ref 1.1–2.4)

## 2019-04-26 LAB
ALBUMIN ELP: 3.6 GM/DL (ref 3.2–5.6)
ALPHA-1-GLOBULIN: 0.2 GM/DL (ref 0.1–0.4)
ALPHA-2-GLOBULIN: 0.6 GM/DL (ref 0.4–1.2)
BETA GLOBULIN: 0.8 GM/DL (ref 0.5–1.3)
GAMMA GLOBULIN: 1.1 GM/DL (ref 0.5–1.6)
KAPPA QUANT FREE LIGHT CHAINS: 2.29 MG/DL (ref 0.33–1.94)
KAPPA/LAMBDA FREE LIGHT CHAIN RATIO: 1.4 (ref 0.26–1.65)
KAPPA/LAMBDA FREE LIGHT CHAIN RATIO: ABNORMAL (ref 0.26–1.65)
LAMBDA FREE LIGHT CHAINS URINE/ VOL: 1.64 MG/DL (ref 0.57–2.63)
SPEP INTERPRETATION: NORMAL
SPEP INTERPRETATION: NORMAL
TOTAL PROTEIN: 6.4 GM/DL (ref 6.4–8.2)

## 2019-07-02 ENCOUNTER — HOSPITAL ENCOUNTER (OUTPATIENT)
Dept: GENERAL RADIOLOGY | Age: 64
Discharge: HOME | End: 2019-07-02
Payer: MEDICARE

## 2019-07-02 ENCOUNTER — HOSPITAL ENCOUNTER (OUTPATIENT)
Age: 64
Discharge: HOME | End: 2019-07-02
Payer: MEDICARE

## 2019-07-02 DIAGNOSIS — C90.00 MULTIPLE MYELOMA, REMISSION STATUS UNSPECIFIED (HCC): ICD-10-CM

## 2019-07-02 PROCEDURE — 72070 X-RAY EXAM THORAC SPINE 2VWS: CPT

## 2019-07-02 PROCEDURE — 72100 X-RAY EXAM L-S SPINE 2/3 VWS: CPT

## 2019-07-23 ENCOUNTER — HOSPITAL ENCOUNTER (OUTPATIENT)
Age: 64
Setting detail: SPECIMEN
Discharge: HOME | End: 2019-07-23
Payer: MEDICARE

## 2019-07-23 LAB
ALBUMIN SERPL-MCNC: 4.3 GM/DL (ref 3.4–5)
ALP BLD-CCNC: 67 IU/L (ref 40–129)
ALT SERPL-CCNC: 23 U/L (ref 10–40)
ANION GAP SERPL CALCULATED.3IONS-SCNC: 9 MMOL/L (ref 4–16)
AST SERPL-CCNC: 27 IU/L (ref 15–37)
BILIRUB SERPL-MCNC: 0.8 MG/DL (ref 0–1)
BUN BLDV-MCNC: 15 MG/DL (ref 6–23)
CALCIUM SERPL-MCNC: 9.6 MG/DL (ref 8.3–10.6)
CHLORIDE BLD-SCNC: 88 MMOL/L (ref 99–110)
CO2: 34 MMOL/L (ref 21–32)
CREAT SERPL-MCNC: 1 MG/DL (ref 0.6–1.1)
ERYTHROCYTE SEDIMENTATION RATE: 12 MM/HR (ref 0–30)
GFR AFRICAN AMERICAN: >60 ML/MIN/1.73M2
GFR NON-AFRICAN AMERICAN: 56 ML/MIN/1.73M2
GLUCOSE BLD-MCNC: 94 MG/DL (ref 70–99)
IGA: 167 MG/DL (ref 69–382)
IGG,SERUM: 947 MG/DL (ref 723–1685)
IGM,SERUM: 47 MG/DL (ref 62–277)
LACTATE DEHYDROGENASE: 173 IU/L (ref 120–246)
POTASSIUM SERPL-SCNC: 3.2 MMOL/L (ref 3.5–5.1)
SODIUM BLD-SCNC: 131 MMOL/L (ref 135–145)
TOTAL PROTEIN: 6.6 GM/DL (ref 6.4–8.2)

## 2019-07-23 PROCEDURE — 85652 RBC SED RATE AUTOMATED: CPT

## 2019-07-23 PROCEDURE — 83883 ASSAY NEPHELOMETRY NOT SPEC: CPT

## 2019-07-23 PROCEDURE — 86320 SERUM IMMUNOELECTROPHORESIS: CPT

## 2019-07-23 PROCEDURE — 80053 COMPREHEN METABOLIC PANEL: CPT

## 2019-07-23 PROCEDURE — 82232 ASSAY OF BETA-2 PROTEIN: CPT

## 2019-07-23 PROCEDURE — 83615 LACTATE (LD) (LDH) ENZYME: CPT

## 2019-07-23 PROCEDURE — 82784 ASSAY IGA/IGD/IGG/IGM EACH: CPT

## 2019-07-23 PROCEDURE — 84165 PROTEIN E-PHORESIS SERUM: CPT

## 2019-07-24 LAB
ALBUMIN ELP: 3.9 GM/DL (ref 3.2–5.6)
ALPHA-1-GLOBULIN: 0.2 GM/DL (ref 0.1–0.4)
ALPHA-2-GLOBULIN: 0.5 GM/DL (ref 0.4–1.2)
BETA GLOBULIN: 0.9 GM/DL (ref 0.5–1.3)
BETA-2 MICROGLOBULIN: 2 MG/L (ref 1.1–2.4)
BETA-2 MICROGLOBULIN: NORMAL MG/L (ref 1.1–2.4)
GAMMA GLOBULIN: 1 GM/DL (ref 0.5–1.6)
SPEP INTERPRETATION: NORMAL
SPEP INTERPRETATION: NORMAL
TOTAL PROTEIN: 6.6 GM/DL (ref 6.4–8.2)

## 2019-07-25 LAB
KAPPA QUANT FREE LIGHT CHAINS: 2.53 MG/DL (ref 0.33–1.94)
KAPPA/LAMBDA FREE LIGHT CHAIN RATIO: 1.51 (ref 0.26–1.65)
KAPPA/LAMBDA FREE LIGHT CHAIN RATIO: ABNORMAL (ref 0.26–1.65)
LAMBDA FREE LIGHT CHAINS URINE/ VOL: 1.67 MG/DL (ref 0.57–2.63)

## 2019-10-23 ENCOUNTER — HOSPITAL ENCOUNTER (OUTPATIENT)
Age: 64
Setting detail: SPECIMEN
Discharge: HOME | End: 2019-10-23
Payer: MEDICARE

## 2019-10-23 LAB
ALBUMIN SERPL-MCNC: 4.5 GM/DL (ref 3.4–5)
ALP BLD-CCNC: 73 IU/L (ref 40–129)
ALT SERPL-CCNC: 30 U/L (ref 10–40)
ANION GAP SERPL CALCULATED.3IONS-SCNC: 10 MMOL/L (ref 4–16)
AST SERPL-CCNC: 72 IU/L (ref 15–37)
BILIRUB SERPL-MCNC: 0.6 MG/DL (ref 0–1)
BUN BLDV-MCNC: 14 MG/DL (ref 6–23)
CALCIUM SERPL-MCNC: 9.5 MG/DL (ref 8.3–10.6)
CHLORIDE BLD-SCNC: 89 MMOL/L (ref 99–110)
CO2: 33 MMOL/L (ref 21–32)
CREAT SERPL-MCNC: 1 MG/DL (ref 0.6–1.1)
ERYTHROCYTE SEDIMENTATION RATE: 14 MM/HR (ref 0–30)
GFR AFRICAN AMERICAN: >60 ML/MIN/1.73M2
GFR NON-AFRICAN AMERICAN: 56 ML/MIN/1.73M2
GLUCOSE BLD-MCNC: 120 MG/DL (ref 70–99)
IGA: 172 MG/DL (ref 69–382)
IGG,SERUM: 1081 MG/DL (ref 723–1685)
IGM,SERUM: 47 MG/DL (ref 62–277)
LACTATE DEHYDROGENASE: 177 IU/L (ref 120–246)
POTASSIUM SERPL-SCNC: 3.1 MMOL/L (ref 3.5–5.1)
SODIUM BLD-SCNC: 132 MMOL/L (ref 135–145)
TOTAL PROTEIN: 6.6 GM/DL (ref 6.4–8.2)

## 2019-10-23 PROCEDURE — 83615 LACTATE (LD) (LDH) ENZYME: CPT

## 2019-10-23 PROCEDURE — 80053 COMPREHEN METABOLIC PANEL: CPT

## 2019-10-23 PROCEDURE — 82784 ASSAY IGA/IGD/IGG/IGM EACH: CPT

## 2019-10-23 PROCEDURE — 84155 ASSAY OF PROTEIN SERUM: CPT

## 2019-10-23 PROCEDURE — 84165 PROTEIN E-PHORESIS SERUM: CPT

## 2019-10-23 PROCEDURE — 85652 RBC SED RATE AUTOMATED: CPT

## 2019-10-23 PROCEDURE — 83883 ASSAY NEPHELOMETRY NOT SPEC: CPT

## 2019-10-23 PROCEDURE — 86334 IMMUNOFIX E-PHORESIS SERUM: CPT

## 2019-10-23 PROCEDURE — 82232 ASSAY OF BETA-2 PROTEIN: CPT

## 2019-10-24 LAB
BETA-2 MICROGLOBULIN: 1.8 MG/L (ref 1.1–2.4)
BETA-2 MICROGLOBULIN: NORMAL MG/L (ref 1.1–2.4)

## 2019-10-26 LAB
KAPPA QUANT FREE LIGHT CHAINS: 2.23 MG/DL (ref 0.33–1.94)
KAPPA/LAMBDA FREE LIGHT CHAIN RATIO: 1.39 (ref 0.26–1.65)
KAPPA/LAMBDA FREE LIGHT CHAIN RATIO: ABNORMAL (ref 0.26–1.65)
LAMBDA FREE LIGHT CHAINS URINE/ VOL: 1.6 MG/DL (ref 0.57–2.63)

## 2020-01-07 ENCOUNTER — OFFICE VISIT (OUTPATIENT)
Dept: INTERNAL MEDICINE CLINIC | Age: 65
End: 2020-01-07
Payer: MEDICARE

## 2020-01-07 VITALS
OXYGEN SATURATION: 99 % | WEIGHT: 165 LBS | DIASTOLIC BLOOD PRESSURE: 80 MMHG | BODY MASS INDEX: 28.32 KG/M2 | SYSTOLIC BLOOD PRESSURE: 152 MMHG | RESPIRATION RATE: 18 BRPM | HEART RATE: 64 BPM

## 2020-01-07 PROCEDURE — 99204 OFFICE O/P NEW MOD 45 MIN: CPT | Performed by: INTERNAL MEDICINE

## 2020-01-07 RX ORDER — ATENOLOL 50 MG/1
50 TABLET ORAL DAILY
Qty: 30 TABLET | Refills: 11
Start: 2020-01-07 | End: 2020-08-24

## 2020-01-07 RX ORDER — FLUOROURACIL 5 MG/G
40 CREAM TOPICAL DAILY
COMMUNITY
End: 2022-08-19 | Stop reason: ALTCHOICE

## 2020-01-07 RX ORDER — UBIDECARENONE 75 MG
100 CAPSULE ORAL DAILY
COMMUNITY
End: 2022-01-24

## 2020-01-07 RX ORDER — LENALIDOMIDE 10 MG/1
10 CAPSULE ORAL DAILY
COMMUNITY
End: 2020-08-06 | Stop reason: SDUPTHER

## 2020-01-07 RX ORDER — ASPIRIN 81 MG/1
81 TABLET, CHEWABLE ORAL DAILY
COMMUNITY
End: 2022-10-28

## 2020-01-07 ASSESSMENT — PATIENT HEALTH QUESTIONNAIRE - PHQ9
SUM OF ALL RESPONSES TO PHQ QUESTIONS 1-9: 0
SUM OF ALL RESPONSES TO PHQ QUESTIONS 1-9: 0
2. FEELING DOWN, DEPRESSED OR HOPELESS: 0
1. LITTLE INTEREST OR PLEASURE IN DOING THINGS: 0
SUM OF ALL RESPONSES TO PHQ9 QUESTIONS 1 & 2: 0

## 2020-01-07 NOTE — PROGRESS NOTES
NM Bone Scan-([facility]) 01/09/20 @10:30am  FIT Cards given to patient
Trachea: Trachea normal.   Cardiovascular:      Rate and Rhythm: Normal rate and regular rhythm. Pulses:           Dorsalis pedis pulses are 2+ on the right side and 2+ on the left side. Posterior tibial pulses are 2+ on the right side and 2+ on the left side. Heart sounds: S1 normal and S2 normal. No murmur. Pulmonary:      Breath sounds: Normal breath sounds. No wheezing, rhonchi or rales. Abdominal:      General: Bowel sounds are normal.      Palpations: Abdomen is soft. Abdomen is not rigid. There is no mass. Tenderness: There is no tenderness. There is no guarding. Hernia: No hernia is present. Musculoskeletal:      Thoracic back: She exhibits tenderness and bony tenderness. Lymphadenopathy:      Head:      Right side of head: No submental, submandibular, tonsillar, preauricular, posterior auricular or occipital adenopathy. Left side of head: No submental, submandibular, tonsillar, preauricular, posterior auricular or occipital adenopathy. Cervical: No cervical adenopathy. Neurological:      Mental Status: She is alert and oriented to person, place, and time. Cranial Nerves: No cranial nerve deficit. Sensory: No sensory deficit. Motor: No tremor. Deep Tendon Reflexes:      Reflex Scores:       Bicep reflexes are 2+ on the right side and 2+ on the left side. Patellar reflexes are 2+ on the right side and 2+ on the left side. Psychiatric:         Speech: Speech normal.         Behavior: Behavior normal.         Thought Content: Thought content normal.         Judgment: Judgment normal.         ASSESSMENT:    1. Multiple myeloma in remission ([address]    2. Mixed hyperlipidemia    3. Essential hypertension    4. Malignant melanoma of left lower extremity including hip ([address]    5. Encounter for preventive care    6. Chronic midline thoracic back pain    7.  Menopause        PLAN:    Tobi Radford was seen today for establish care and back

## 2020-01-09 ENCOUNTER — HOSPITAL ENCOUNTER (OUTPATIENT)
Dept: NUCLEAR MEDICINE | Age: 65
Discharge: HOME | End: 2020-01-09
Payer: MEDICARE

## 2020-01-09 PROCEDURE — 3430000000 HC RX DIAGNOSTIC RADIOPHARMACEUTICAL

## 2020-01-09 PROCEDURE — 78306 BONE IMAGING WHOLE BODY: CPT

## 2020-01-09 PROCEDURE — 3430000000 HC RX DIAGNOSTIC RADIOPHARMACEUTICAL: Performed by: INTERNAL MEDICINE

## 2020-01-09 PROCEDURE — A9503 TC99M MEDRONATE: HCPCS

## 2020-01-09 PROCEDURE — A9503 TC99M MEDRONATE: HCPCS | Performed by: INTERNAL MEDICINE

## 2020-01-09 RX ORDER — TC 99M MEDRONATE 20 MG/10ML
25 INJECTION, POWDER, LYOPHILIZED, FOR SOLUTION INTRAVENOUS
Status: COMPLETED | OUTPATIENT
Start: 2020-01-09 | End: 2020-01-09

## 2020-01-09 RX ADMIN — TC 99M MEDRONATE 25 MILLICURIE: 20 INJECTION, POWDER, LYOPHILIZED, FOR SOLUTION INTRAVENOUS at 10:55

## 2020-01-17 LAB
CONTROL: POSITIVE
HEMOCCULT STL QL: NEGATIVE

## 2020-01-17 PROCEDURE — 82274 ASSAY TEST FOR BLOOD FECAL: CPT | Performed by: INTERNAL MEDICINE

## 2020-01-21 ENCOUNTER — HOSPITAL ENCOUNTER (OUTPATIENT)
Dept: INFUSION THERAPY | Age: 65
Discharge: HOME | End: 2020-01-21
Payer: MEDICARE

## 2020-01-21 ENCOUNTER — HOSPITAL ENCOUNTER (OUTPATIENT)
Age: 65
Discharge: HOME | End: 2020-01-21
Payer: MEDICARE

## 2020-01-21 LAB
ALBUMIN SERPL-MCNC: 4 GM/DL (ref 3.4–5)
ALBUMIN SERPL-MCNC: 4.1 GM/DL (ref 3.4–5)
ALP BLD-CCNC: 74 IU/L (ref 40–128)
ALP BLD-CCNC: 74 IU/L (ref 40–129)
ALT SERPL-CCNC: 18 U/L (ref 10–40)
ALT SERPL-CCNC: 18 U/L (ref 10–40)
ANION GAP SERPL CALCULATED.3IONS-SCNC: 11 MMOL/L (ref 4–16)
ANION GAP SERPL CALCULATED.3IONS-SCNC: 11 MMOL/L (ref 4–16)
AST SERPL-CCNC: 18 IU/L (ref 15–37)
AST SERPL-CCNC: 20 IU/L (ref 15–37)
BASOPHILS ABSOLUTE: 0 K/CU MM
BASOPHILS RELATIVE PERCENT: 1 % (ref 0–1)
BILIRUB SERPL-MCNC: 0.4 MG/DL (ref 0–1)
BILIRUB SERPL-MCNC: 0.4 MG/DL (ref 0–1)
BUN BLDV-MCNC: 13 MG/DL (ref 6–23)
BUN BLDV-MCNC: 13 MG/DL (ref 6–23)
CALCIUM SERPL-MCNC: 9 MG/DL (ref 8.3–10.6)
CALCIUM SERPL-MCNC: 9.3 MG/DL (ref 8.3–10.6)
CHLORIDE BLD-SCNC: 95 MMOL/L (ref 99–110)
CHLORIDE BLD-SCNC: 97 MMOL/L (ref 99–110)
CHOLESTEROL, FASTING: 180 MG/DL
CO2: 28 MMOL/L (ref 21–32)
CO2: 29 MMOL/L (ref 21–32)
CREAT SERPL-MCNC: 1 MG/DL (ref 0.6–1.1)
CREAT SERPL-MCNC: 1 MG/DL (ref 0.6–1.1)
DIFFERENTIAL TYPE: ABNORMAL
EOSINOPHILS ABSOLUTE: 0.1 K/CU MM
EOSINOPHILS RELATIVE PERCENT: 6 % (ref 0–3)
ERYTHROCYTE SEDIMENTATION RATE: 15 MM/HR (ref 0–30)
GFR AFRICAN AMERICAN: >60 ML/MIN/1.73M2
GFR AFRICAN AMERICAN: >60 ML/MIN/1.73M2
GFR NON-AFRICAN AMERICAN: 56 ML/MIN/1.73M2
GFR NON-AFRICAN AMERICAN: 56 ML/MIN/1.73M2
GLUCOSE BLD-MCNC: 93 MG/DL (ref 70–99)
GLUCOSE BLD-MCNC: 97 MG/DL (ref 70–99)
HCT VFR BLD CALC: 36.4 % (ref 37–47)
HDLC SERPL-MCNC: 75 MG/DL
HEMOGLOBIN: 12.8 GM/DL (ref 12.5–16)
IGA: 177 MG/DL (ref 69–382)
IGG,SERUM: 1056 MG/DL (ref 723–1685)
IGM,SERUM: 45 MG/DL (ref 62–277)
LACTATE DEHYDROGENASE: 149 IU/L (ref 120–246)
LDL CHOLESTEROL DIRECT: 101 MG/DL
LYMPHOCYTES ABSOLUTE: 0.5 K/CU MM
LYMPHOCYTES RELATIVE PERCENT: 20 % (ref 24–44)
MCH RBC QN AUTO: 31.8 PG (ref 27–31)
MCHC RBC AUTO-ENTMCNC: 35.2 % (ref 32–36)
MCV RBC AUTO: 90.5 FL (ref 78–100)
MONOCYTES ABSOLUTE: 0.4 K/CU MM
MONOCYTES RELATIVE PERCENT: 19 % (ref 0–4)
PDW BLD-RTO: 14.3 % (ref 11.7–14.9)
PLATELET # BLD: 183 K/CU MM (ref 140–440)
PMV BLD AUTO: 11.2 FL (ref 7.5–11.1)
POTASSIUM SERPL-SCNC: 4.1 MMOL/L (ref 3.5–5.1)
POTASSIUM SERPL-SCNC: 4.7 MMOL/L (ref 3.5–5.1)
RBC # BLD: 4.02 M/CU MM (ref 4.2–5.4)
SEGMENTED NEUTROPHILS ABSOLUTE COUNT: 1.3 K/CU MM
SEGMENTED NEUTROPHILS RELATIVE PERCENT: 54 % (ref 36–66)
SODIUM BLD-SCNC: 134 MMOL/L (ref 135–145)
SODIUM BLD-SCNC: 137 MMOL/L (ref 135–145)
TOTAL PROTEIN: 6.4 GM/DL (ref 6.4–8.2)
TOTAL PROTEIN: 6.5 GM/DL (ref 6.4–8.2)
TRIGLYCERIDE, FASTING: 105 MG/DL
WBC # BLD: 2.3 K/CU MM (ref 4–10.5)

## 2020-01-21 PROCEDURE — 85652 RBC SED RATE AUTOMATED: CPT

## 2020-01-21 PROCEDURE — 36415 COLL VENOUS BLD VENIPUNCTURE: CPT

## 2020-01-21 PROCEDURE — 86320 SERUM IMMUNOELECTROPHORESIS: CPT

## 2020-01-21 PROCEDURE — 80061 LIPID PANEL: CPT

## 2020-01-21 PROCEDURE — 83615 LACTATE (LD) (LDH) ENZYME: CPT

## 2020-01-21 PROCEDURE — 80053 COMPREHEN METABOLIC PANEL: CPT

## 2020-01-21 PROCEDURE — 85025 COMPLETE CBC W/AUTO DIFF WBC: CPT

## 2020-01-21 PROCEDURE — 84165 PROTEIN E-PHORESIS SERUM: CPT

## 2020-01-21 PROCEDURE — 82232 ASSAY OF BETA-2 PROTEIN: CPT

## 2020-01-21 PROCEDURE — 83883 ASSAY NEPHELOMETRY NOT SPEC: CPT

## 2020-01-21 PROCEDURE — 82784 ASSAY IGA/IGD/IGG/IGM EACH: CPT

## 2020-01-22 LAB
ALBUMIN ELP: 3.6 GM/DL (ref 3.2–5.6)
ALPHA-1-GLOBULIN: 0.3 GM/DL (ref 0.1–0.4)
ALPHA-2-GLOBULIN: 0.6 GM/DL (ref 0.4–1.2)
BETA GLOBULIN: 0.9 GM/DL (ref 0.5–1.3)
GAMMA GLOBULIN: 1.1 GM/DL (ref 0.5–1.6)
SPEP INTERPRETATION: NORMAL
SPEP INTERPRETATION: NORMAL
TOTAL PROTEIN: 6.5 GM/DL (ref 6.4–8.2)

## 2020-01-23 LAB
BETA-2 MICROGLOBULIN: 2.1 MG/L (ref 1.1–2.4)
BETA-2 MICROGLOBULIN: NORMAL MG/L (ref 1.1–2.4)
KAPPA QUANT FREE LIGHT CHAINS: 2.76 MG/DL (ref 0.33–1.94)
KAPPA/LAMBDA FREE LIGHT CHAIN RATIO: 1.45 (ref 0.26–1.65)
KAPPA/LAMBDA FREE LIGHT CHAIN RATIO: ABNORMAL (ref 0.26–1.65)
LAMBDA FREE LIGHT CHAINS URINE/ VOL: 1.9 MG/DL (ref 0.57–2.63)

## 2020-01-24 ENCOUNTER — HOSPITAL ENCOUNTER (OUTPATIENT)
Dept: WOMENS IMAGING | Age: 65
Discharge: HOME | End: 2020-01-24
Payer: MEDICARE

## 2020-01-24 PROCEDURE — 77063 BREAST TOMOSYNTHESIS BI: CPT

## 2020-01-24 PROCEDURE — 77080 DXA BONE DENSITY AXIAL: CPT

## 2020-01-24 RX ORDER — ALENDRONATE SODIUM 70 MG/1
70 TABLET ORAL
Qty: 4 TABLET | Refills: 3 | Status: SHIPPED | OUTPATIENT
Start: 2020-01-24 | End: 2020-05-18

## 2020-04-22 ENCOUNTER — HOSPITAL ENCOUNTER (OUTPATIENT)
Dept: INFUSION THERAPY | Age: 65
Discharge: HOME | End: 2020-04-22
Payer: MEDICARE

## 2020-04-22 LAB
ALBUMIN SERPL-MCNC: 4 GM/DL (ref 3.4–5)
ALP BLD-CCNC: 66 IU/L (ref 40–129)
ALT SERPL-CCNC: 20 U/L (ref 10–40)
ANION GAP SERPL CALCULATED.3IONS-SCNC: 9 MMOL/L (ref 4–16)
AST SERPL-CCNC: 18 IU/L (ref 15–37)
BASOPHILS ABSOLUTE: 0 K/CU MM
BASOPHILS RELATIVE PERCENT: 0.5 % (ref 0–1)
BILIRUB SERPL-MCNC: 0.5 MG/DL (ref 0–1)
BUN BLDV-MCNC: 12 MG/DL (ref 6–23)
CALCIUM SERPL-MCNC: 8.8 MG/DL (ref 8.3–10.6)
CHLORIDE BLD-SCNC: 100 MMOL/L (ref 99–110)
CO2: 28 MMOL/L (ref 21–32)
CREAT SERPL-MCNC: 1 MG/DL (ref 0.6–1.1)
DIFFERENTIAL TYPE: ABNORMAL
EOSINOPHILS ABSOLUTE: 0.1 K/CU MM
EOSINOPHILS RELATIVE PERCENT: 5.1 % (ref 0–3)
ERYTHROCYTE SEDIMENTATION RATE: 7 MM/HR (ref 0–30)
GFR AFRICAN AMERICAN: >60 ML/MIN/1.73M2
GFR NON-AFRICAN AMERICAN: 56 ML/MIN/1.73M2
GLUCOSE BLD-MCNC: 93 MG/DL (ref 70–99)
HCT VFR BLD CALC: 38 % (ref 37–47)
HEMOGLOBIN: 12.9 GM/DL (ref 12.5–16)
IGA: 181 MG/DL (ref 69–382)
IGG,SERUM: 980 MG/DL (ref 723–1685)
IGM,SERUM: 44 MG/DL (ref 62–277)
LACTATE DEHYDROGENASE: 138 IU/L (ref 120–246)
LYMPHOCYTES ABSOLUTE: 0.4 K/CU MM
LYMPHOCYTES RELATIVE PERCENT: 19.9 % (ref 24–44)
MCH RBC QN AUTO: 31.5 PG (ref 27–31)
MCHC RBC AUTO-ENTMCNC: 33.9 % (ref 32–36)
MCV RBC AUTO: 92.7 FL (ref 78–100)
MONOCYTES ABSOLUTE: 0.3 K/CU MM
MONOCYTES RELATIVE PERCENT: 16.8 % (ref 0–4)
PDW BLD-RTO: 15.2 % (ref 11.7–14.9)
PLATELET # BLD: 169 K/CU MM (ref 140–440)
PMV BLD AUTO: 10.8 FL (ref 7.5–11.1)
POTASSIUM SERPL-SCNC: 3.9 MMOL/L (ref 3.5–5.1)
RBC # BLD: 4.1 M/CU MM (ref 4.2–5.4)
SEGMENTED NEUTROPHILS ABSOLUTE COUNT: 1.1 K/CU MM
SEGMENTED NEUTROPHILS RELATIVE PERCENT: 57.7 % (ref 36–66)
SODIUM BLD-SCNC: 137 MMOL/L (ref 135–145)
TOTAL PROTEIN: 6.1 GM/DL (ref 6.4–8.2)
WBC # BLD: 2 K/CU MM (ref 4–10.5)

## 2020-04-22 PROCEDURE — 82232 ASSAY OF BETA-2 PROTEIN: CPT

## 2020-04-22 PROCEDURE — 83883 ASSAY NEPHELOMETRY NOT SPEC: CPT

## 2020-04-22 PROCEDURE — 85025 COMPLETE CBC W/AUTO DIFF WBC: CPT

## 2020-04-22 PROCEDURE — 80053 COMPREHEN METABOLIC PANEL: CPT

## 2020-04-22 PROCEDURE — 85652 RBC SED RATE AUTOMATED: CPT

## 2020-04-22 PROCEDURE — 86320 SERUM IMMUNOELECTROPHORESIS: CPT

## 2020-04-22 PROCEDURE — 82784 ASSAY IGA/IGD/IGG/IGM EACH: CPT

## 2020-04-22 PROCEDURE — 83615 LACTATE (LD) (LDH) ENZYME: CPT

## 2020-04-22 PROCEDURE — 36415 COLL VENOUS BLD VENIPUNCTURE: CPT

## 2020-04-22 PROCEDURE — 84165 PROTEIN E-PHORESIS SERUM: CPT

## 2020-04-24 LAB
ALBUMIN ELP: 3.4 GM/DL (ref 3.2–5.6)
ALPHA-1-GLOBULIN: 0.2 GM/DL (ref 0.1–0.4)
ALPHA-2-GLOBULIN: 0.5 GM/DL (ref 0.4–1.2)
BETA GLOBULIN: 0.9 GM/DL (ref 0.5–1.3)
BETA-2 MICROGLOBULIN: 2 MG/L (ref 1.1–2.4)
GAMMA GLOBULIN: 1.1 GM/DL (ref 0.5–1.6)
SPEP INTERPRETATION: ABNORMAL
SPEP INTERPRETATION: NORMAL
TOTAL PROTEIN: 6.1 GM/DL (ref 6.4–8.2)

## 2020-04-25 LAB
KAPPA QUANT FREE LIGHT CHAINS: 24.95 MG/L (ref 3.3–19.4)
KAPPA/LAMBDA FREE LIGHT CHAIN RATIO: 1.23 (ref 0.26–1.65)
LAMBDA FREE LIGHT CHAINS URINE/ VOL: 20.22 MG/L (ref 5.71–26.3)

## 2020-04-29 ENCOUNTER — HOSPITAL ENCOUNTER (OUTPATIENT)
Dept: INFUSION THERAPY | Age: 65
Discharge: HOME | End: 2020-04-29
Payer: MEDICARE

## 2020-04-29 LAB
BASOPHILS ABSOLUTE: 0 K/CU MM
BASOPHILS RELATIVE PERCENT: 1.3 % (ref 0–1)
DIFFERENTIAL TYPE: ABNORMAL
EOSINOPHILS ABSOLUTE: 0.1 K/CU MM
EOSINOPHILS RELATIVE PERCENT: 4.3 % (ref 0–3)
HCT VFR BLD CALC: 35.9 % (ref 37–47)
HEMOGLOBIN: 12.2 GM/DL (ref 12.5–16)
LYMPHOCYTES ABSOLUTE: 0.6 K/CU MM
LYMPHOCYTES RELATIVE PERCENT: 25.2 % (ref 24–44)
MCH RBC QN AUTO: 31.8 PG (ref 27–31)
MCHC RBC AUTO-ENTMCNC: 34 % (ref 32–36)
MCV RBC AUTO: 93.5 FL (ref 78–100)
MONOCYTES ABSOLUTE: 0.4 K/CU MM
MONOCYTES RELATIVE PERCENT: 16.1 % (ref 0–4)
PDW BLD-RTO: 14.9 % (ref 11.7–14.9)
PLATELET # BLD: 184 K/CU MM (ref 140–440)
PMV BLD AUTO: 10.6 FL (ref 7.5–11.1)
RBC # BLD: 3.84 M/CU MM (ref 4.2–5.4)
SEGMENTED NEUTROPHILS ABSOLUTE COUNT: 1.2 K/CU MM
SEGMENTED NEUTROPHILS RELATIVE PERCENT: 53.1 % (ref 36–66)
WBC # BLD: 2.3 K/CU MM (ref 4–10.5)

## 2020-04-29 PROCEDURE — 36415 COLL VENOUS BLD VENIPUNCTURE: CPT

## 2020-04-29 PROCEDURE — 99211 OFF/OP EST MAY X REQ PHY/QHP: CPT

## 2020-04-29 PROCEDURE — 85025 COMPLETE CBC W/AUTO DIFF WBC: CPT

## 2020-05-18 RX ORDER — ALENDRONATE SODIUM 70 MG/1
TABLET ORAL
Qty: 4 TABLET | Refills: 2 | Status: SHIPPED | OUTPATIENT
Start: 2020-05-18 | End: 2020-07-07 | Stop reason: SDUPTHER

## 2020-07-07 ENCOUNTER — VIRTUAL VISIT (OUTPATIENT)
Dept: INTERNAL MEDICINE CLINIC | Age: 65
End: 2020-07-07
Payer: MEDICARE

## 2020-07-07 PROCEDURE — 99213 OFFICE O/P EST LOW 20 MIN: CPT | Performed by: INTERNAL MEDICINE

## 2020-07-07 RX ORDER — ALENDRONATE SODIUM 70 MG/1
TABLET ORAL
Qty: 4 TABLET | Refills: 11 | Status: SHIPPED | OUTPATIENT
Start: 2020-07-07 | End: 2021-01-11 | Stop reason: SDUPTHER

## 2020-07-07 NOTE — PROGRESS NOTES
2020    TELEHEALTH EVALUATION -- Audio/Visual (During MNGCA-34 public health emergency)    HPI:    Osvaldo Larkin (:  1955) has requested an audio/video evaluation for the following concern(s):    \"I feel pretty good. \"     Yesterday pt had her full body check from derm, had 2 biopsies. She also uses effudex for some lesions. She continues to see Dr Dorcas Kussmaul, next at the end of the month. She continues on revlimid. The patient is taking hypertensive medications compliantly without side effects. Denies chest pain, dyspnea, edema, or TIA's. Blood pressure has been 120-130/80. She is very cautious with Covid. Review of Systems    Prior to Visit Medications    Medication Sig Taking? Authorizing Provider   alendronate (FOSAMAX) 70 MG tablet TAKE ONE TABLET BY MOUTH ONCE WEEKLY Yes Jefry Urban MD   vitamin B-12 (CYANOCOBALAMIN) 100 MCG tablet Take 100 mcg by mouth daily Yes Historical Provider, MD   lenalidomide (REVLIMID) 10 MG chemo capsule Take 10 mg by mouth daily Yes Historical Provider, MD   aspirin 81 MG chewable tablet Take 81 mg by mouth daily Yes Historical Provider, MD   fluoruracil (CARAC) 0.5 % cream Apply 40 g topically daily Apply topically daily. Yes Historical Provider, MD   atenolol (TENORMIN) 50 MG tablet Take 1 tablet by mouth daily Yes Jefry Urban MD   ibuprofen (ADVIL;MOTRIN) 400 MG tablet Take 1 tablet by mouth every 6 hours as needed for Pain Yes Devan Oneal MD   Multiple Vitamin (MULTIVITAMIN) capsule Take by mouth Yes Historical Provider, MD   Omega-3 Fatty Acids (FISH OIL) 1000 MG CAPS Take by mouth Yes Historical Provider, MD   DULoxetine (CYMBALTA) 60 MG extended release capsule Take 60 mg by mouth Yes Historical Provider, MD       Social History     Tobacco Use    Smoking status: Never Smoker    Smokeless tobacco: Never Used   Substance Use Topics    Alcohol use:  Yes     Alcohol/week: 7.0 standard drinks     Types: 7 Cans of beer per week    Drug use: No          PHYSICAL EXAMINATION:  Constitutional: [x] Appears well-developed and well-nourished [x] No apparent distress      [] Abnormal-   Mental status  [x] Alert and awake  [x] Oriented to person/place/time [x]Able to follow commands             Psychiatric:       [x] Normal Affect [x] No Hallucinations      ASSESSMENT/PLAN:  1. Osteoporosis, unspecified osteoporosis type, unspecified pathological fracture presence - refill med  - alendronate (FOSAMAX) 70 MG tablet; TAKE ONE TABLET BY MOUTH ONCE WEEKLY  Dispense: 4 tablet; Refill: 11    2. Essential hypertension - on atenolol, no chnage    3. Multiple myeloma in remission [facility]) - doing well; continues on revlimid      Return in about 6 months (around 1/7/2021). Martha Morrison is a 72 y.o. female being evaluated by a Virtual Visit (video visit) encounter to address concerns as mentioned above. A caregiver was present when appropriate. Due to this being a TeleHealth encounter (During JHEWO-56 public health emergency), evaluation of the following organ systems was limited: Vitals/Constitutional/EENT/Resp/CV/GI//MS/Neuro/Skin/Heme-Lymph-Imm. Pursuant to the emergency declaration under the [address] [place] [address], [address] [place] [address] authority and the [employer] and [employer] Act, this Virtual Visit was conducted with patient's (and/or legal guardian's) consent, to reduce the patient's risk of exposure to COVID-19 and provide necessary medical care. The patient (and/or legal guardian) has also been advised to contact this office for worsening conditions or problems, and seek emergency medical treatment and/or call 911 if deemed necessary. Patient identification was verified at the start of the visit: Yes    Total time spent on this encounter: Not billed by time    Services were provided through a video synchronous discussion virtually to substitute for in-person clinic visit. Patient and provider were located at their individual homes. --Ange Dejesus MD on 7/7/2020 at 9:34 AM    An electronic signature was used to authenticate this note.

## 2020-07-30 ENCOUNTER — HOSPITAL ENCOUNTER (OUTPATIENT)
Dept: INFUSION THERAPY | Age: 65
Discharge: HOME | End: 2020-07-30
Payer: MEDICARE

## 2020-07-30 PROCEDURE — 99211 OFF/OP EST MAY X REQ PHY/QHP: CPT

## 2020-08-06 RX ORDER — LENALIDOMIDE 10 MG/1
10 CAPSULE ORAL DAILY
Qty: 28 CAPSULE | Refills: 0 | Status: SHIPPED | OUTPATIENT
Start: 2020-08-06 | End: 2020-08-06 | Stop reason: SDUPTHER

## 2020-08-06 RX ORDER — LENALIDOMIDE 10 MG/1
10 CAPSULE ORAL DAILY
Qty: 28 CAPSULE | Refills: 0 | Status: SHIPPED | OUTPATIENT
Start: 2020-08-06 | End: 2020-09-15 | Stop reason: SDUPTHER

## 2020-08-13 ENCOUNTER — APPOINTMENT (OUTPATIENT)
Dept: CT IMAGING | Age: 65
End: 2020-08-13
Payer: MEDICARE

## 2020-08-13 ENCOUNTER — APPOINTMENT (OUTPATIENT)
Dept: GENERAL RADIOLOGY | Age: 65
End: 2020-08-13
Payer: MEDICARE

## 2020-08-13 ENCOUNTER — HOSPITAL ENCOUNTER (EMERGENCY)
Age: 65
Discharge: HOME | End: 2020-08-13
Payer: MEDICARE

## 2020-08-13 VITALS
HEIGHT: 64 IN | HEART RATE: 71 BPM | BODY MASS INDEX: 27.31 KG/M2 | DIASTOLIC BLOOD PRESSURE: 72 MMHG | OXYGEN SATURATION: 98 % | TEMPERATURE: 98.4 F | RESPIRATION RATE: 17 BRPM | WEIGHT: 160 LBS | SYSTOLIC BLOOD PRESSURE: 146 MMHG

## 2020-08-13 PROCEDURE — 71046 X-RAY EXAM CHEST 2 VIEWS: CPT

## 2020-08-13 PROCEDURE — 99284 EMERGENCY DEPT VISIT MOD MDM: CPT

## 2020-08-13 PROCEDURE — 72125 CT NECK SPINE W/O DYE: CPT

## 2020-08-13 PROCEDURE — 70450 CT HEAD/BRAIN W/O DYE: CPT

## 2020-08-13 PROCEDURE — 70486 CT MAXILLOFACIAL W/O DYE: CPT

## 2020-08-13 ASSESSMENT — PAIN DESCRIPTION - PAIN TYPE: TYPE: ACUTE PAIN

## 2020-08-13 ASSESSMENT — PAIN SCALES - GENERAL: PAINLEVEL_OUTOF10: 4

## 2020-08-13 NOTE — ED PROVIDER NOTES
EMERGENCY DEPARTMENT ENCOUNTER      PCP: Ashwin Woods MD    [facility]    Chief Complaint   Patient presents with    Fall         This patient was not evaluated by the attending physician. I have independently evaluated this patient. HPI    Anette Blackman is a 72 y.o. female who presents with fall. Onset was prior to arrival.  Context is patient was walking her dog and got tangled in the leash causing her to fall backwards striking her posterior head on the ground. She denies loss of consciousness. She has pain in the posterior scalp as well as the left maxillary region. No neck pain. No other pain or numbness, tingling, weakness. The fall was mechanical in nature without preceding symptoms. REVIEW OF SYSTEMS    General: No Fever  ENT:  No visual changes. Cardiac: No Chest Pain, No syncope  Respiratory: No cough or difficulty breathing  GI: No vomiting. No Bloody Stool or Diarrhea  : No Dysuria or Hematuria  MSKTL:  See HPI. No back pain.   Skin:  Denies rash  Neurologic:  Denies headache, focal weakness or sensory changes   Endocrine:  Denies polyuria or polydypsia   Lymphatic:  Denies swollen glands   See HPI and nursing notes for additional information       PAST MEDICAL & SURGICAL HISTORY    Past Medical History:   Diagnosis Date    Colon polyps     HTN (hypertension)     HTN (hypertension) 9/8/2015    Melanoma ([facility] [address] 2010, 2006    Multiple myeloma ([facility] [address] 9/29/2015    Multiple myeloma ([facility] [address] 9/29/14     Past Surgical History:   Procedure Laterality Date    COLONOSCOPY      HYSTERECTOMY, TOTAL ABDOMINAL  1999    fibroids    SKIN CANCER EXCISION  2010, 2006 2010-Right shoulder melanoma; 2006-Left lower Extremity melanoma    TONSILLECTOMY Bilateral 1975       CURRENT MEDICATIONS    Current Outpatient Rx   Medication Sig Dispense Refill    lenalidomide (REVLIMID) 10 MG chemo capsule Take 1 capsule by mouth daily 28 capsule 0    alendronate (FOSAMAX) 70 MG organization: None     Attends meetings of clubs or organizations: None     Relationship status: None    Intimate partner violence     Fear of current or ex partner: None     Emotionally abused: None     Physically abused: None     Forced sexual activity: None   Other Topics Concern    None   Social History Narrative    Exercise - walking daily 7-8K steps daily     Family History   Problem Relation Age of Onset    Cancer Mother 76        breast    High Blood Pressure Mother     High Cholesterol Mother     High Blood Pressure Father     Heart Failure Father     High Blood Pressure Sister     High Blood Pressure Brother        PHYSICAL EXAM    VITAL SIGNS: BP (!) 146/72   Pulse 71   Temp 98.4 °F (36.9 °C) (Oral)   Resp 17   Ht 5' 4\" (1.626 m)   Wt 160 lb (72.6 kg)   SpO2 98%   BMI 27.46 kg/m²    Constitutional:  Well developed, well nourished, no acute distress   Eyes: EOMI. PERRL, sclera nonicteric. Anterior chambers clear. HENT: There is small area of soft tissue swelling over the left posterior occipital region, superior aspect. Otherwise scalp is atraumatic, no trismus. Ears canals and TMs free of blood or clear fluid. Nasal passages and oropharynx free of blood or clear fluid. No circumferential periorbital ecchymosis or mastoid ecchymosis noted. Neck/Lymphatics: supple, no JVD  Initial exam reveals patient in cervical spine precautions, collar present. Post CT imaging and collar removal exam reveals  No posterior neck tenderness. Range of motion without obvious pain or deficit. Respiratory:  Lungs Clear, no retractions   Cardiovascular:   normal rate, no murmurs  GI:  Soft, nontender, normal bowel sounds  Musculoskeletal:    Head to toe musculoskeletal exam reveals full range of motion without obvious pain or deficit to bilateral upper and lower extremities. Motor, strength, distal sensation and pulses intact.   Integument:  Well hydrated, no petechiae     Neurologic:    - Alert & oriented person, place, time, and situation, no speech difficulties or slurring.  - No obvious gross motor deficits  - Cranial nerves 2-12 grossly intact  - Negative meningeal signs.  - Sensation intact to light touch  - Strength 5/5 in upper and lower extremities bilaterally  - Normal finger to nose test bilaterally  - Rapid alternating movements intact  - Normal heel-shin bilaterally  - No pronator drift. - Light touch sensation intact throughout. - Upper and lower extremity DTRs 2+ bilaterally. - Gait steady and without difficulty    Psych: Pleasant affect, no hallucinations        RADIOLOGY   XR CHEST (2 VW)   Final Result   No acute process. CT HEAD WO CONTRAST   Final Result   No acute intracranial abnormality. CT FACIAL BONES WO CONTRAST   Final Result   No acute traumatic injury of the facial bones. CT CERVICAL SPINE WO CONTRAST   Final Result   No acute osseous abnormality of the cervical spine. ED COURSE & MEDICAL DECISION MAKING        Patient arrives with mechanical fall with injury to top of posterior scalp region. Patient is neurologically intact throughout ED course. Patient is without vomiting or unusual behavior. CT imaging today does not reveal intracranial abnormality, cervical spine abnormality and chest x-ray is without acute findings. Patient is neurologically intact on exam and I feel safe for discharge home at this time with head injury precautions- information sheet provided today. I recommend recheck at PCP over the next several days. Return to emergency Department precautions were discussed in detail with patient and include worsening pain, new symptoms. Clinical  IMPRESSION    1. Injury of head, initial encounter    2.  Fall in elderly patient              Comment: Please note this report has been produced using speech recognition software and may contain errors related to that system including errors in grammar, punctuation, and spelling, as well as words and phrases that may be inappropriate. If there are any questions or concerns please feel free to contact the dictating provider for clarification.        Kimberly Pandya  08/13/20 1011

## 2020-08-13 NOTE — ED TRIAGE NOTES
Patient walking dog and tripped and fell on concrete. Patient complaints of head pain in the back and  left jaw pain. Patient is in cervical collar at this time.

## 2020-08-18 ENCOUNTER — TELEPHONE (OUTPATIENT)
Dept: ONCOLOGY | Age: 65
End: 2020-08-18

## 2020-08-18 NOTE — TELEPHONE ENCOUNTER
Jolene Pollard from [facility] called. Stated they did not have a prescription for Revlmed.     Fax [phone]    Direct contact is [phone] Megan Henley)    Thank you,    Paulita Cowden

## 2020-08-18 NOTE — TELEPHONE ENCOUNTER
I sent it to [facility] last week. I don't believe she uses [facility] for her Revlimid.

## 2020-08-24 RX ORDER — ATENOLOL 50 MG/1
TABLET ORAL
Qty: 90 TABLET | Refills: 0 | Status: SHIPPED | OUTPATIENT
Start: 2020-08-24 | End: 2020-11-19

## 2020-09-15 RX ORDER — LENALIDOMIDE 10 MG/1
10 CAPSULE ORAL DAILY
Qty: 28 CAPSULE | Refills: 0 | Status: SHIPPED | OUTPATIENT
Start: 2020-09-15 | End: 2020-10-13 | Stop reason: SDUPTHER

## 2020-09-30 RX ORDER — DULOXETIN HYDROCHLORIDE 60 MG/1
CAPSULE, DELAYED RELEASE ORAL
Qty: 60 CAPSULE | Refills: 4 | Status: SHIPPED | OUTPATIENT
Start: 2020-09-30 | End: 2021-02-25

## 2020-10-13 RX ORDER — LENALIDOMIDE 10 MG/1
10 CAPSULE ORAL DAILY
Qty: 28 CAPSULE | Refills: 0 | Status: SHIPPED | OUTPATIENT
Start: 2020-10-13 | End: 2020-11-11 | Stop reason: SDUPTHER

## 2020-10-14 PROBLEM — R53.83 OTHER FATIGUE: Status: ACTIVE | Noted: 2020-10-14

## 2020-10-14 PROBLEM — G62.0 DRUG-INDUCED POLYNEUROPATHY (HCC): Status: ACTIVE | Noted: 2020-10-14

## 2020-10-14 PROBLEM — D61.82: Status: ACTIVE | Noted: 2020-10-14

## 2020-10-23 ENCOUNTER — HOSPITAL ENCOUNTER (OUTPATIENT)
Dept: INFUSION THERAPY | Age: 65
Discharge: HOME | End: 2020-10-23
Payer: MEDICARE

## 2020-10-23 DIAGNOSIS — C90.00 MULTIPLE MYELOMA, REMISSION STATUS UNSPECIFIED (HCC): ICD-10-CM

## 2020-10-23 LAB
ALBUMIN SERPL-MCNC: 3.8 GM/DL (ref 3.4–5)
ALP BLD-CCNC: 78 IU/L (ref 40–129)
ALT SERPL-CCNC: 37 U/L (ref 10–40)
ANION GAP SERPL CALCULATED.3IONS-SCNC: 10 MMOL/L (ref 4–16)
AST SERPL-CCNC: 34 IU/L (ref 15–37)
BASOPHILS ABSOLUTE: 0 K/CU MM
BASOPHILS RELATIVE PERCENT: 0.6 % (ref 0–1)
BILIRUB SERPL-MCNC: 0.6 MG/DL (ref 0–1)
BUN BLDV-MCNC: 11 MG/DL (ref 6–23)
CALCIUM SERPL-MCNC: 8.7 MG/DL (ref 8.3–10.6)
CHLORIDE BLD-SCNC: 97 MMOL/L (ref 99–110)
CO2: 27 MMOL/L (ref 21–32)
CREAT SERPL-MCNC: 1 MG/DL (ref 0.6–1.1)
DIFFERENTIAL TYPE: ABNORMAL
EOSINOPHILS ABSOLUTE: 0.2 K/CU MM
EOSINOPHILS RELATIVE PERCENT: 11 % (ref 0–3)
ERYTHROCYTE SEDIMENTATION RATE: 6 MM/HR (ref 0–30)
GFR AFRICAN AMERICAN: >60 ML/MIN/1.73M2
GFR NON-AFRICAN AMERICAN: 56 ML/MIN/1.73M2
GLUCOSE BLD-MCNC: 94 MG/DL (ref 70–99)
HCT VFR BLD CALC: 36.5 % (ref 37–47)
HEMOGLOBIN: 13.1 GM/DL (ref 12.5–16)
IGA: 186 MG/DL (ref 69–382)
IGG,SERUM: 965 MG/DL (ref 723–1685)
IGM,SERUM: 37 MG/DL (ref 62–277)
LACTATE DEHYDROGENASE: 167 IU/L (ref 120–246)
LYMPHOCYTES ABSOLUTE: 0.1 K/CU MM
LYMPHOCYTES RELATIVE PERCENT: 8.4 % (ref 24–44)
MCH RBC QN AUTO: 32.8 PG (ref 27–31)
MCHC RBC AUTO-ENTMCNC: 35.9 % (ref 32–36)
MCV RBC AUTO: 91.5 FL (ref 78–100)
MONOCYTES ABSOLUTE: 0.3 K/CU MM
MONOCYTES RELATIVE PERCENT: 18.2 % (ref 0–4)
PDW BLD-RTO: 14.9 % (ref 11.7–14.9)
PLATELET # BLD: 154 K/CU MM (ref 140–440)
PMV BLD AUTO: 11.4 FL (ref 7.5–11.1)
POTASSIUM SERPL-SCNC: 3.6 MMOL/L (ref 3.5–5.1)
RBC # BLD: 3.99 M/CU MM (ref 4.2–5.4)
SEGMENTED NEUTROPHILS ABSOLUTE COUNT: 1 K/CU MM
SEGMENTED NEUTROPHILS RELATIVE PERCENT: 61.8 % (ref 36–66)
SODIUM BLD-SCNC: 134 MMOL/L (ref 135–145)
TOTAL PROTEIN: 6 GM/DL (ref 6.4–8.2)
WBC # BLD: 1.5 K/CU MM (ref 4–10.5)

## 2020-10-23 PROCEDURE — 82784 ASSAY IGA/IGD/IGG/IGM EACH: CPT

## 2020-10-23 PROCEDURE — 83883 ASSAY NEPHELOMETRY NOT SPEC: CPT

## 2020-10-23 PROCEDURE — 86320 SERUM IMMUNOELECTROPHORESIS: CPT

## 2020-10-23 PROCEDURE — 85025 COMPLETE CBC W/AUTO DIFF WBC: CPT

## 2020-10-23 PROCEDURE — 85652 RBC SED RATE AUTOMATED: CPT

## 2020-10-23 PROCEDURE — 36415 COLL VENOUS BLD VENIPUNCTURE: CPT

## 2020-10-23 PROCEDURE — 82232 ASSAY OF BETA-2 PROTEIN: CPT

## 2020-10-23 PROCEDURE — 84165 PROTEIN E-PHORESIS SERUM: CPT

## 2020-10-23 PROCEDURE — 83615 LACTATE (LD) (LDH) ENZYME: CPT

## 2020-10-23 PROCEDURE — 80053 COMPREHEN METABOLIC PANEL: CPT

## 2020-10-25 LAB — BETA-2 MICROGLOBULIN: 3 MG/L (ref 1.1–2.4)

## 2020-10-26 LAB
KAPPA QUANT FREE LIGHT CHAINS: 31.57 MG/L (ref 3.3–19.4)
KAPPA/LAMBDA FREE LIGHT CHAIN RATIO: 1.26 (ref 0.26–1.65)
LAMBDA FREE LIGHT CHAINS URINE/ VOL: 24.98 MG/L (ref 5.71–26.3)

## 2020-10-28 LAB
ALBUMIN ELP: 3.5 GM/DL (ref 3.2–5.6)
ALPHA-1-GLOBULIN: 0.2 GM/DL (ref 0.1–0.4)
ALPHA-2-GLOBULIN: 0.6 GM/DL (ref 0.4–1.2)
BETA GLOBULIN: 0.8 GM/DL (ref 0.5–1.3)
GAMMA GLOBULIN: 0.9 GM/DL (ref 0.5–1.6)
SPEP INTERPRETATION: ABNORMAL
SPEP INTERPRETATION: NORMAL
TOTAL PROTEIN: 6 GM/DL (ref 6.4–8.2)

## 2020-10-29 NOTE — PROGRESS NOTES
Patient Name: Sukumar Dutton  Patient : 1955  Patient MRN: [id number]     Primary Oncologist: Mekhi Weeks MD  Referring Provider: Oswaldo Vail MD     Date of Service: 10/30/2020      Chief Complaint:   Chief Complaint   Patient presents with    Follow-up     Patient Active Problem List:     Multiple myeloma     HPI:  Lorne Kearney is a 66-year-old very pleasant female with medical history significant for hypertension, anxiety disorder, and anemia since 2015, initially referred to me for evaluation for her normocytic normochromic anemia. She stated that she has been having right lateral chest wall pain starting May 2015. She had chest x-ray done in 2015 and it showed left sixth rib fracture. Her pain has been controlled with ibuprofen since then. On 2015, she was found to have anemia (hemoglobin 9.3) and she was started with oral iron supplementation. Her anemia got worse on repeat blood test done on 2015, (hemoglobin 8.1). She had chest x-ray and ultrasound of the abdomen on 2015, and they were normal.      She had a colonoscopy by Dr. Arron Adam about three to four years ago which was normal according to her. She also had history of melanoma twice in the past (left lower extremity melanoma which was excised in  and right shoulder melanoma which was excised in ). She was also found to have elevated total protein by primary care physician. Because of her normocytic normochromic anemia associated with elevated total protein, she was subsequently referred to me for evaluation of possible plasma cell dyscrasia. She denies fever, night sweats, loss of appetite, and weight loss. She does not have any other significant symptoms except right lower chest wall pain.       Laboratory workups done on 2015, showed persistent normocytic normochromic anemia (hemoglobin 8.2 and MCV 89), persisted elevated total protein becomes refractory or progressed. Ms. Ethan Baires completed her eight cycles of chemotherapy with cyclophosphamide, bortezomib, and dexamethasone on July 8, 2016. Maintenance chemotherapy with Velcade and dexamethasone was started on August 2, 2016 and she completed it on June 12, 2018. I recognized that her monoclonal protein went up to 1100 mg/dL on recent blood test done on July 18, 2018. She was subsequently evaluated by Dr. Kerby Baumgarten and she believes it is due to stopping velcade. She recommend to resume maintenance chemotherapy. She prefers Nilaro maintenance over velcade because of ease of administration. Maintenance chemotherapy with Tilmon All was started on August 20, 2018 and we stopped it on November 27, 2018 since she has biochemical progression of the disease. We decided to change it to maintenance chemotherapy with Revlimid. Revlimid 10 mg daily was started on December 6, 2018. On October 30, 2020, she presented to me for followup. I have been following Ms. Serrano for IgG Kappa multiple myeloma and she is currently on maintenance chemotherapy with Revlimid (10 mg daily) since December 6, 2018. She is tolerating revlimid very well and she doesn't encounter any major side effects from it. I recognize that her monoclonal protein was 200 mg/dl on blood test done on October 23, 2020. It was very small to measure on recent blood test done at [facility] on 7/20/20. It was 200 mg/dl on 4/22/2020, 200 mg/dl on 1/21/20, 430 mg/dl on 10/23/19, 200 mg on 7/23/19,  400mg on 4/23/19, 1000mg on  1/22/19 and 2100 mg/dL on blood test done on November 20, 2018. Her IgG level is back to normal since 1/22/19. I believe she is responding very well to Revlimid and I recommend her to continue with that for now. I will see her again in three months and I will repeat all the blood tests again a week before next appointment. She is staying asymptomatic from her multiple myeloma.  She has mild neuropathy in her right upper extremity, but it is less likely to be related to revlimid. Will continue to monitor it for now. She was recently found to have osteoporosis and her primary care physician started fosamax. I recommend her to continue with that for now. She fell in 8/13/20 at home. CT head, facial bone and cervical spine showed no acute osseous abnormality. Besides that, she does not have any other significant symptoms on  today's visit. PAST MEDICAL HISTORY:  Significant for:  1. Hypertension. 2.         Melanoma. 3.         Anxiety disorder. PAST SURGICAL HISTORY:  Significant for:  1. Total abdominal hysterectomy in 1999.  2.         Left lower extremity excision and biopsy for melanoma in 2006. 3.         Excision and biopsy of right shoulder melanoma in 2010. FAMILY HISTORY:  Significant for breast cancer in her mother. No other family history of cancer disease. SOCIAL HISTORY:  She denies smoking and illicit drug abuse. She socially drinks alcohol. She is  and she is currently living in [facility]. ALLERGIES:  No known drug allergies. Oncology History    No history exists. Review of Systems: \"Per interval history; otherwise 10 point ROS is negative. \"  Her energy level is good, appetite, and sleep are fine. No fever, chills, night sweats, cough, shortness of breath, chest pain, hemoptysis, or palpitations. Her bowel and bladder functions are normal. She denies nausea, vomiting, abdominal pain, diarrhea, constipation, dysuria, loss of appetite, or weight loss. She has mild, stable neuropathy in her right upper extremity. She doesn't have bleeding or clotting issues. She doesn't have any pain in her body. No anxiety or depression. The rest of the systems are unremarkable.      Vital Signs:  BP (!) 146/70 (Site: Right Upper Arm, Position: Sitting, Cuff Size: Medium Adult)   Pulse 57   Temp 96.6 °F (35.9 °C) (Temporal)   Ht 5' 4\" (1.626 m)   Wt 27 10/23/2020    BUN 11 10/23/2020    CREATININE 1.0 10/23/2020    GLUCOSE 94 10/23/2020    CALCIUM 8.7 10/23/2020    PROT 6.0 (L) 10/23/2020    PROT 6.0 (L) 10/23/2020    LABALBU 3.8 10/23/2020    BILITOT 0.6 10/23/2020    ALKPHOS 78 10/23/2020    AST 34 10/23/2020    ALT 37 10/23/2020    LABGLOM 56 (L) 10/23/2020    GFRAA >60 10/23/2020    AGRATIO 0.3 (L) 09/09/2015    GLOB 10.1 09/09/2015    PHOS 3.8 10/09/2015    MG 2.0 10/09/2015     Lab Results   Component Value Date     10/23/2020     No components found for: LD  Lab Results   Component Value Date    TSHHS 2.120 07/05/2018     Immunology:  Lab Results   Component Value Date    PROT 6.0 (L) 10/23/2020    PROT 6.0 (L) 10/23/2020    SPEP  10/23/2020     INTERPRETATION - Monoclonal gammopathy, IgG kappa, 200 mg/dL, has remained stable since 4/22/2020. SAF    SPEP  10/23/2020     INTERPRETATION - An IgG kappa monoclonal band is identified. SAF    ALBUMINELP 3.5 10/23/2020    LABALPH 0.2 10/23/2020    LABALPH 0.6 10/23/2020    LABBETA 0.8 10/23/2020    GAMGLOB 0.9 10/23/2020     Lab Results   Component Value Date    KAPPAUVOL 31.57 (H) 10/23/2020    LAMBDAUVOL 24.98 10/23/2020    KLFLCR 1.26 10/23/2020     Lab Results   Component Value Date    B2M 3.0 (H) 10/23/2020     Coagulation Panel:  No results found for: PROTIME, INR, APTT, DDIMER  Anemia Panel:  No results found for: [id number], FOLATE  Tumor Markers:  No results found for: , CEA, , LABCA2, PSA  Observations:  PHQ-9 Total Score: 0 (10/30/2020  9:51 AM)        Assessment & Plan:   1. Symptomatic IgG Kappa multiple myeloma. 2.         Normocytic normochromic anemia - due to multiple myeloma. PLAN:  Overall Ms. Zach is feeling quite well and does not have any significant new symptoms on today's visit. I have been following Ms. Serrano for IgG Kappa multiple myeloma and she is currently on maintenance chemotherapy with Revlimid (10 mg daily) since December 6, 2018.  She is tolerating revlimid very well and she doesn't encounter any major side effects from it. I recognize that her monoclonal protein was 200 mg/dl on blood test done on October 23, 2020. It was very small to measure on recent blood test done at [address] [place] [address] on 7/20/20. It was 200 mg/dl on 4/22/2020, 200 mg/dl on 1/21/20, 430 mg/dl on 10/23/19, 200 mg on 7/23/19,  400mg on 4/23/19, 1000mg on  1/22/19 and 2100 mg/dL on blood test done on November 20, 2018. Her IgG level is back to normal since 1/22/19. I believe she is responding very well to Revlimid and I recommend her to continue with that for now. I will see her again in three months and I will repeat all the blood tests again a week before next appointment. She is staying asymptomatic from her multiple myeloma. She has mild neuropathy in her right upper extremity, but it is less likely to be related to revlimid. Will continue to monitor it for now. She was recently found to have osteoporosis and her primary care physician started fosamax. I recommend her to continue with that for now. She fell in 8/13/20 at home. CT head, facial bone and cervical spine showed no acute osseous abnormality. I have reviewed all these plans with Ms. Zach and she is in agreement with the plans. I answered all her questions and concerns for today. I asked her to follow up with her primary care physician on regular basis. Recent imaging and labs were reviewed and discussed with the patient.

## 2020-10-30 ENCOUNTER — HOSPITAL ENCOUNTER (OUTPATIENT)
Dept: INFUSION THERAPY | Age: 65
Discharge: HOME | End: 2020-10-30
Payer: MEDICARE

## 2020-10-30 ENCOUNTER — OFFICE VISIT (OUTPATIENT)
Dept: ONCOLOGY | Age: 65
End: 2020-10-30
Payer: MEDICARE

## 2020-10-30 VITALS
WEIGHT: 160.2 LBS | TEMPERATURE: 96.6 F | BODY MASS INDEX: 27.35 KG/M2 | DIASTOLIC BLOOD PRESSURE: 70 MMHG | OXYGEN SATURATION: 57 % | HEIGHT: 64 IN | HEART RATE: 57 BPM | SYSTOLIC BLOOD PRESSURE: 146 MMHG

## 2020-10-30 DIAGNOSIS — C90.00 MULTIPLE MYELOMA NOT HAVING ACHIEVED REMISSION (HCC): ICD-10-CM

## 2020-10-30 LAB
BASOPHILS ABSOLUTE: 0 K/CU MM
BASOPHILS RELATIVE PERCENT: 1.4 % (ref 0–1)
DIFFERENTIAL TYPE: ABNORMAL
EOSINOPHILS ABSOLUTE: 0.1 K/CU MM
EOSINOPHILS RELATIVE PERCENT: 2.3 % (ref 0–3)
HCT VFR BLD CALC: 35.8 % (ref 37–47)
HEMOGLOBIN: 12.3 GM/DL (ref 12.5–16)
LYMPHOCYTES ABSOLUTE: 0.5 K/CU MM
LYMPHOCYTES RELATIVE PERCENT: 23.4 % (ref 24–44)
MCH RBC QN AUTO: 31.7 PG (ref 27–31)
MCHC RBC AUTO-ENTMCNC: 34.4 % (ref 32–36)
MCV RBC AUTO: 92.3 FL (ref 78–100)
MONOCYTES ABSOLUTE: 0.3 K/CU MM
MONOCYTES RELATIVE PERCENT: 14 % (ref 0–4)
PDW BLD-RTO: 14.8 % (ref 11.7–14.9)
PLATELET # BLD: 199 K/CU MM (ref 140–440)
PMV BLD AUTO: 10.3 FL (ref 7.5–11.1)
RBC # BLD: 3.88 M/CU MM (ref 4.2–5.4)
SEGMENTED NEUTROPHILS ABSOLUTE COUNT: 1.3 K/CU MM
SEGMENTED NEUTROPHILS RELATIVE PERCENT: 58.9 % (ref 36–66)
WBC # BLD: 2.2 K/CU MM (ref 4–10.5)

## 2020-10-30 PROCEDURE — 99213 OFFICE O/P EST LOW 20 MIN: CPT | Performed by: INTERNAL MEDICINE

## 2020-10-30 PROCEDURE — 85025 COMPLETE CBC W/AUTO DIFF WBC: CPT

## 2020-10-30 PROCEDURE — 36415 COLL VENOUS BLD VENIPUNCTURE: CPT

## 2020-10-30 PROCEDURE — 99211 OFF/OP EST MAY X REQ PHY/QHP: CPT

## 2020-10-30 ASSESSMENT — PATIENT HEALTH QUESTIONNAIRE - PHQ9
2. FEELING DOWN, DEPRESSED OR HOPELESS: 0
SUM OF ALL RESPONSES TO PHQ9 QUESTIONS 1 & 2: 0
SUM OF ALL RESPONSES TO PHQ QUESTIONS 1-9: 0
SUM OF ALL RESPONSES TO PHQ QUESTIONS 1-9: 0
1. LITTLE INTEREST OR PLEASURE IN DOING THINGS: 0
SUM OF ALL RESPONSES TO PHQ QUESTIONS 1-9: 0

## 2020-10-30 NOTE — PROGRESS NOTES
MA Rooming Questions  Patient: Tal Dia  MRN: [id number]    Date: 10/30/2020        1. Do you have any new issues?   no         2. Do you need any refills on medications?    no    3. Have you had any imaging done since your last visit?   no    4. Have you been hospitalized or seen in the emergency room since your last visit here? Yes, fall back in august    5. Did the patient have a depression screening completed today?  Yes    PHQ-9 Total Score: 0 (10/30/2020  9:51 AM)       PHQ-9 Given to (if applicable):               PHQ-9 Score (if applicable):                     [] Positive     []  Negative              Does question #9 need addressed (if applicable)                     [] Yes    []  No               Abby Reinoso

## 2020-11-11 RX ORDER — LENALIDOMIDE 10 MG/1
10 CAPSULE ORAL DAILY
Qty: 28 CAPSULE | Refills: 0 | Status: SHIPPED | OUTPATIENT
Start: 2020-11-11 | End: 2020-12-15 | Stop reason: SDUPTHER

## 2020-11-19 RX ORDER — ATENOLOL 50 MG/1
TABLET ORAL
Qty: 90 TABLET | Refills: 0 | Status: SHIPPED | OUTPATIENT
Start: 2020-11-19 | End: 2021-02-24

## 2020-12-07 ENCOUNTER — TELEPHONE (OUTPATIENT)
Dept: ONCOLOGY | Age: 65
End: 2020-12-07

## 2020-12-15 RX ORDER — LENALIDOMIDE 10 MG/1
10 CAPSULE ORAL DAILY
Qty: 28 CAPSULE | Refills: 0 | Status: SHIPPED | OUTPATIENT
Start: 2020-12-15 | End: 2021-01-19 | Stop reason: SDUPTHER

## 2021-01-11 ENCOUNTER — VIRTUAL VISIT (OUTPATIENT)
Dept: INTERNAL MEDICINE CLINIC | Age: 66
End: 2021-01-11
Payer: MEDICARE

## 2021-01-11 DIAGNOSIS — D61.82: ICD-10-CM

## 2021-01-11 DIAGNOSIS — M81.0 OSTEOPOROSIS, UNSPECIFIED OSTEOPOROSIS TYPE, UNSPECIFIED PATHOLOGICAL FRACTURE PRESENCE: ICD-10-CM

## 2021-01-11 DIAGNOSIS — G62.0 DRUG-INDUCED POLYNEUROPATHY (HCC): ICD-10-CM

## 2021-01-11 DIAGNOSIS — C90.00 MULTIPLE MYELOMA NOT HAVING ACHIEVED REMISSION (HCC): Primary | ICD-10-CM

## 2021-01-11 PROCEDURE — G8510 SCR DEP NEG, NO PLAN REQD: HCPCS | Performed by: INTERNAL MEDICINE

## 2021-01-11 PROCEDURE — 99213 OFFICE O/P EST LOW 20 MIN: CPT | Performed by: INTERNAL MEDICINE

## 2021-01-11 RX ORDER — ALENDRONATE SODIUM 70 MG/1
TABLET ORAL
Qty: 4 TABLET | Refills: 11 | Status: SHIPPED | OUTPATIENT
Start: 2021-01-11 | End: 2021-12-22

## 2021-01-11 ASSESSMENT — PATIENT HEALTH QUESTIONNAIRE - PHQ9
1. LITTLE INTEREST OR PLEASURE IN DOING THINGS: 0
2. FEELING DOWN, DEPRESSED OR HOPELESS: 0
SUM OF ALL RESPONSES TO PHQ QUESTIONS 1-9: 0
SUM OF ALL RESPONSES TO PHQ9 QUESTIONS 1 & 2: 0

## 2021-01-11 NOTE — PROGRESS NOTES
2021    TELEHEALTH EVALUATION -- Audio/Visual (During - public health emergency)    HPI:    Natanael Goldsmith (:  1955) has requested an audio/video evaluation for the following concern(s):    She continues to walk for exercise. She continues on revlamid for the myeloma. She follows with Dr Sweta Bergeron every 3 months. Neuropathy is doing OK, continues on cymbalta. The patient is taking hypertensive medications compliantly without side effects. Denies chest pain, dyspnea, edema, or TIA's. Blood pressure has been <140/90. Review of Systems    Prior to Visit Medications    Medication Sig Taking? Authorizing Provider   alendronate (FOSAMAX) 70 MG tablet TAKE ONE TABLET BY MOUTH ONCE WEEKLY Yes Leon Kc MD   lenalidomide (REVLIMID) 10 MG chemo capsule Take 1 capsule by mouth daily Auth #: [id number] Yes Ric Ambriz MD   atenolol (TENORMIN) 50 MG tablet TAKE ONE TABLET BY MOUTH DAILY Yes Ric Ambriz MD   DULoxetine (CYMBALTA) 60 MG extended release capsule TAKE ONE CAPSULE BY MOUTH TWICE A DAY Yes Ric Ambriz MD   vitamin B-12 (CYANOCOBALAMIN) 100 MCG tablet Take 100 mcg by mouth daily Yes Historical Provider, MD   aspirin 81 MG chewable tablet Take 81 mg by mouth daily Yes Historical Provider, MD   fluoruracil (CARAC) 0.5 % cream Apply 40 g topically daily Apply topically daily. Yes Historical Provider, MD   ibuprofen (ADVIL;MOTRIN) 400 MG tablet Take 1 tablet by mouth every 6 hours as needed for Pain Yes Ayala Hunter MD   Multiple Vitamin (MULTIVITAMIN) capsule Take by mouth Yes Historical Provider, MD   Omega-3 Fatty Acids (FISH OIL) 1000 MG CAPS Take by mouth Yes Historical Provider, MD       Social History     Tobacco Use    Smoking status: Never Smoker    Smokeless tobacco: Never Used   Substance Use Topics    Alcohol use:  Yes     Alcohol/week: 7.0 standard drinks     Types: 7 Cans of beer per week    Drug use: No          PHYSICAL EXAMINATION:    Constitutional: [x] Appears well-developed and well-nourished [x] No apparent distress      [] Abnormal-   Mental status  [x] Alert and awake  [x] Oriented to person/place/time [x]Able to follow commands             Psychiatric:       [x] Normal Affect [x] No Hallucinations        ASSESSMENT/PLAN:  1. Osteoporosis, unspecified osteoporosis type, unspecified pathological fracture presence - cont fosamax  - alendronate (FOSAMAX) 70 MG tablet; TAKE ONE TABLET BY MOUTH ONCE WEEKLY  Dispense: 4 tablet; Refill: 11    2. Drug-induced polyneuropathy (HCC) - stable on the cymbalta; no change    3. Multiple myeloma not having achieved remission (HCC) - cont revlimid    4. Myelophthisis ([address] - gets regualr CBC; no chnage      Return in about 6 months (around 7/11/2021). Mariajose Lucio is a 72 y.o. female being evaluated by a Virtual Visit (video visit) encounter to address concerns as mentioned above. A caregiver was present when appropriate. Due to this being a TeleHealth encounter (During [facility]-01 public health emergency), evaluation of the following organ systems was limited: Vitals/Constitutional/EENT/Resp/CV/GI//MS/Neuro/Skin/Heme-Lymph-Imm. Pursuant to the emergency declaration under the [address] [place] [address], [address] [place] [address] authority and the [employer] Act, this Virtual Visit was conducted with patient's (and/or legal guardian's) consent, to reduce the patient's risk of exposure to COVID-19 and provide necessary medical care. The patient (and/or legal guardian) has also been advised to contact this office for worsening conditions or problems, and seek emergency medical treatment and/or call 911 if deemed necessary.      Patient identification was verified at the start of the visit: Yes    Total time spent on this encounter: Not billed by time    Services were provided through a video synchronous discussion virtually to substitute for in-person clinic visit. Patient and provider were located at their individual homes. --Audie Waggoner MD on 1/11/2021 at 12:47 PM    An electronic signature was used to authenticate this note.

## 2021-01-19 DIAGNOSIS — C90.00 MULTIPLE MYELOMA, REMISSION STATUS UNSPECIFIED (HCC): ICD-10-CM

## 2021-01-19 RX ORDER — LENALIDOMIDE 10 MG/1
10 CAPSULE ORAL DAILY
Qty: 28 CAPSULE | Refills: 0 | Status: SHIPPED | OUTPATIENT
Start: 2021-01-19 | End: 2021-02-15 | Stop reason: SDUPTHER

## 2021-01-22 ENCOUNTER — HOSPITAL ENCOUNTER (OUTPATIENT)
Dept: INFUSION THERAPY | Age: 66
Discharge: HOME | End: 2021-01-22
Payer: MEDICARE

## 2021-01-22 DIAGNOSIS — D61.82: ICD-10-CM

## 2021-01-22 DIAGNOSIS — C90.00 MULTIPLE MYELOMA NOT HAVING ACHIEVED REMISSION (HCC): ICD-10-CM

## 2021-01-22 LAB
ALBUMIN SERPL-MCNC: 3.9 GM/DL (ref 3.4–5)
ALP BLD-CCNC: 60 IU/L (ref 40–129)
ALT SERPL-CCNC: 22 U/L (ref 10–40)
ANION GAP SERPL CALCULATED.3IONS-SCNC: 10 MMOL/L (ref 4–16)
AST SERPL-CCNC: 22 IU/L (ref 15–37)
BASOPHILS ABSOLUTE: 0 K/CU MM
BASOPHILS RELATIVE PERCENT: 0.9 % (ref 0–1)
BILIRUB SERPL-MCNC: 0.6 MG/DL (ref 0–1)
BUN BLDV-MCNC: 12 MG/DL (ref 6–23)
CALCIUM SERPL-MCNC: 8.9 MG/DL (ref 8.3–10.6)
CHLORIDE BLD-SCNC: 96 MMOL/L (ref 99–110)
CO2: 28 MMOL/L (ref 21–32)
CREAT SERPL-MCNC: 1 MG/DL (ref 0.6–1.1)
DIFFERENTIAL TYPE: ABNORMAL
EOSINOPHILS ABSOLUTE: 0.1 K/CU MM
EOSINOPHILS RELATIVE PERCENT: 4.4 % (ref 0–3)
ERYTHROCYTE SEDIMENTATION RATE: 12 MM/HR (ref 0–30)
GFR AFRICAN AMERICAN: >60 ML/MIN/1.73M2
GFR NON-AFRICAN AMERICAN: 56 ML/MIN/1.73M2
GLUCOSE BLD-MCNC: 96 MG/DL (ref 70–99)
HCT VFR BLD CALC: 36.7 % (ref 37–47)
HEMOGLOBIN: 12.6 GM/DL (ref 12.5–16)
IGA: 193 MG/DL (ref 69–382)
IGG,SERUM: 1066 MG/DL (ref 723–1685)
IGM,SERUM: 35 MG/DL (ref 62–277)
LACTATE DEHYDROGENASE: 148 IU/L (ref 120–246)
LYMPHOCYTES ABSOLUTE: 0.5 K/CU MM
LYMPHOCYTES RELATIVE PERCENT: 22.7 % (ref 24–44)
MCH RBC QN AUTO: 32.3 PG (ref 27–31)
MCHC RBC AUTO-ENTMCNC: 34.3 % (ref 32–36)
MCV RBC AUTO: 94.1 FL (ref 78–100)
MONOCYTES ABSOLUTE: 0.4 K/CU MM
MONOCYTES RELATIVE PERCENT: 17.9 % (ref 0–4)
PDW BLD-RTO: 15 % (ref 11.7–14.9)
PLATELET # BLD: 159 K/CU MM (ref 140–440)
PMV BLD AUTO: 11 FL (ref 7.5–11.1)
POTASSIUM SERPL-SCNC: 3.7 MMOL/L (ref 3.5–5.1)
RBC # BLD: 3.9 M/CU MM (ref 4.2–5.4)
SEGMENTED NEUTROPHILS ABSOLUTE COUNT: 1.2 K/CU MM
SEGMENTED NEUTROPHILS RELATIVE PERCENT: 54.1 % (ref 36–66)
SODIUM BLD-SCNC: 134 MMOL/L (ref 135–145)
TOTAL PROTEIN: 6.2 GM/DL (ref 6.4–8.2)
WBC # BLD: 2.3 K/CU MM (ref 4–10.5)

## 2021-01-22 PROCEDURE — 83883 ASSAY NEPHELOMETRY NOT SPEC: CPT

## 2021-01-22 PROCEDURE — 36415 COLL VENOUS BLD VENIPUNCTURE: CPT

## 2021-01-22 PROCEDURE — 84165 PROTEIN E-PHORESIS SERUM: CPT

## 2021-01-22 PROCEDURE — 86320 SERUM IMMUNOELECTROPHORESIS: CPT

## 2021-01-22 PROCEDURE — 83615 LACTATE (LD) (LDH) ENZYME: CPT

## 2021-01-22 PROCEDURE — 80053 COMPREHEN METABOLIC PANEL: CPT

## 2021-01-22 PROCEDURE — 85652 RBC SED RATE AUTOMATED: CPT

## 2021-01-22 PROCEDURE — 85025 COMPLETE CBC W/AUTO DIFF WBC: CPT

## 2021-01-22 PROCEDURE — 82784 ASSAY IGA/IGD/IGG/IGM EACH: CPT

## 2021-01-22 PROCEDURE — 82232 ASSAY OF BETA-2 PROTEIN: CPT

## 2021-01-23 LAB — BETA-2 MICROGLOBULIN: 2.1 MG/L (ref 1.1–2.4)

## 2021-01-24 LAB
KAPPA QUANT FREE LIGHT CHAINS: 28.63 MG/L (ref 3.3–19.4)
KAPPA/LAMBDA FREE LIGHT CHAIN RATIO: 1.32 (ref 0.26–1.65)
LAMBDA FREE LIGHT CHAINS QNT: 21.69 MG/L (ref 5.71–26.3)

## 2021-01-25 LAB
ALBUMIN ELP: 3.5 GM/DL (ref 3.2–5.6)
ALPHA-1-GLOBULIN: 0.2 GM/DL (ref 0.1–0.4)
ALPHA-2-GLOBULIN: 0.5 GM/DL (ref 0.4–1.2)
BETA GLOBULIN: 0.9 GM/DL (ref 0.5–1.3)
GAMMA GLOBULIN: 1.1 GM/DL (ref 0.5–1.6)
SPEP INTERPRETATION: ABNORMAL
SPEP INTERPRETATION: NORMAL
TOTAL PROTEIN: 6.2 GM/DL (ref 6.4–8.2)

## 2021-01-29 ENCOUNTER — HOSPITAL ENCOUNTER (OUTPATIENT)
Dept: INFUSION THERAPY | Age: 66
Discharge: HOME | End: 2021-01-29
Payer: MEDICARE

## 2021-01-29 ENCOUNTER — OFFICE VISIT (OUTPATIENT)
Dept: ONCOLOGY | Age: 66
End: 2021-01-29
Payer: MEDICARE

## 2021-01-29 VITALS
SYSTOLIC BLOOD PRESSURE: 157 MMHG | HEART RATE: 62 BPM | HEIGHT: 65 IN | DIASTOLIC BLOOD PRESSURE: 70 MMHG | TEMPERATURE: 97.8 F | WEIGHT: 162 LBS | OXYGEN SATURATION: 100 % | BODY MASS INDEX: 26.99 KG/M2

## 2021-01-29 DIAGNOSIS — Z12.31 SCREENING MAMMOGRAM FOR HIGH-RISK PATIENT: Primary | ICD-10-CM

## 2021-01-29 PROCEDURE — 99211 OFF/OP EST MAY X REQ PHY/QHP: CPT

## 2021-01-29 PROCEDURE — 99214 OFFICE O/P EST MOD 30 MIN: CPT | Performed by: INTERNAL MEDICINE

## 2021-01-29 ASSESSMENT — PATIENT HEALTH QUESTIONNAIRE - PHQ9
SUM OF ALL RESPONSES TO PHQ QUESTIONS 1-9: 0
1. LITTLE INTEREST OR PLEASURE IN DOING THINGS: 0

## 2021-01-29 NOTE — PROGRESS NOTES
MA Rooming Questions  Patient: Jose Leroy  MRN: [id number]    Date: 1/29/2021        1. Do you have any new issues? yes - trouble breathing when using the stairs, as well as should she get the covid vaccine ? 2. Do you need any refills on medications?    no    3. Have you had any imaging done since your last visit? yes - biopsy at dermatologist     4. Have you been hospitalized or seen in the emergency room since your last visit here?   no    5. Did the patient have a depression screening completed today?  Yes    PHQ-9 Total Score: 0 (1/29/2021 10:14 AM)       PHQ-9 Given to (if applicable):               PHQ-9 Score (if applicable):                     [] Positive     []  Negative              Does question #9 need addressed (if applicable)                     [] Yes    []  No               Linda Larios MA

## 2021-01-31 NOTE — PROGRESS NOTES
Patient Name: Refugio Spatz  Patient : 1955  Patient MRN: [id number]     Primary Oncologist: Kanu Garrison MD  Referring Provider: Angel Strauss MD     Date of Service: 2021      Chief Complaint:   Chief Complaint   Patient presents with    Follow-up     Patient Active Problem List:     Multiple myeloma     HPI:  Mary Ellen Miner is a 70-year-old very pleasant female with medical history significant for hypertension, anxiety disorder, and anemia since 2015, initially referred to me for evaluation for her normocytic normochromic anemia. She stated that she has been having right lateral chest wall pain starting May 2015. She had chest x-ray done in 2015 and it showed left sixth rib fracture. Her pain has been controlled with ibuprofen since then. On 2015, she was found to have anemia (hemoglobin 9.3) and she was started with oral iron supplementation. Her anemia got worse on repeat blood test done on 2015, (hemoglobin 8.1). She had chest x-ray and ultrasound of the abdomen on 2015, and they were normal.      She had a colonoscopy by Dr. Leroy Gardiner about three to four years ago which was normal according to her. She also had history of melanoma twice in the past (left lower extremity melanoma which was excised in  and right shoulder melanoma which was excised in ). She was also found to have elevated total protein by primary care physician. Because of her normocytic normochromic anemia associated with elevated total protein, she was subsequently referred to me for evaluation of possible plasma cell dyscrasia. She denies fever, night sweats, loss of appetite, and weight loss. She does not have any other significant symptoms except right lower chest wall pain.       Laboratory workups done on 2015, showed persistent normocytic normochromic anemia (hemoglobin 8.2 and MCV 89), persisted elevated total protein (total protein 14.1), normal calcium (calcium 9.9), slight elevation in serum creatinine (creatinine 1.2), elevated Beta-2 microglobulin (6.5), and elevated ESR (more than 120). Serum protein electrophoresis and immunofixation was sent on September 14, 2015, and it showed monoclonal IgG Kappa protein of 6.8 gram/dL. Serum Kappa light chain was 16.9 and serum Lambda light chain was less than 0.16. I did bone marrow biopsy on September 19, 2015, and the bone marrow aspirate flow cytometry showed 38 percent plasma cell population consistent with plasma cell neoplasm. Bone marrow biopsy  stain was about 98 percent positive for  positive plasma cell. 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 Gain of 1q has been associated with advanced disease and it is considered a high risk indicator. Cytogenetic studies showed normal female karyotype (46XX). Ms. Ondina Medina had a PET-CT scan on October 1, 2015, and it showed no metabolically active lytic lesions. Focal skin thickening in the mid medial right cuff with associated metabolic activity. This is non-specific and can be seen with infectious or inflammatory process; however, given the history of melanoma, recommend correlation with direct visualization. Ms. Ondina Medina was started with chemotherapy (bortezomib, cyclophosphamide, and dexamethasone) on October 2, 2015. Ms. Ondina Medina was seen by Dr. Herlinda Hammond at [facility] for possible autologous stem cell transplantation. Dr. Herlinda Hammond and Ms. Serrano decided to have stem cell collection for future use. Stem cell was collected on the first week of March 2016. Our plan was to continue with current chemotherapy followed by maintenance chemotherapy.  Dr. Herlinda Hammond will consider autologous stem cell transplantation when her disease becomes refractory or progressed. Ms. Gillian Meigs completed her eight cycles of chemotherapy with cyclophosphamide, bortezomib, and dexamethasone on July 8, 2016. Maintenance chemotherapy with Velcade and dexamethasone was started on August 2, 2016 and she completed it on June 12, 2018. I recognized that her monoclonal protein went up to 1100 mg/dL on recent blood test done on July 18, 2018. She was subsequently evaluated by Dr. Usha Ruiz and she believes it is due to stopping velcade. She recommend to resume maintenance chemotherapy. She prefers Nilaro maintenance over velcade because of ease of administration. Maintenance chemotherapy with Christine Mariano was started on August 20, 2018 and we stopped it on November 27, 2018 since she has biochemical progression of the disease. We decided to change it to maintenance chemotherapy with Revlimid. Revlimid 10 mg daily was started on December 6, 2018. On January 29, 2021, she presented to me for followup. I have been following Ms. Serrano for IgG Kappa multiple myeloma and she is currently on maintenance chemotherapy with Revlimid (10 mg daily) since December 6, 2018. She is tolerating revlimid very well and she doesn't encounter any major side effects from it. I recognize that her monoclonal protein was 200mg/dl on 1/22/21. It was 200 mg/dl on October 23, 2020, very small to measure on [facility] on 7/20/20, 200 mg/dl on 4/22/2020, 200 mg/dl on 1/21/20, 430 mg/dl on 10/23/19, 200 mg on 7/23/19,  400mg on 4/23/19, 1000mg on  1/22/19 and 2100 mg/dL on blood test done on November 20, 2018. I believe she is responding very well to Revlimid and I recommend her to continue with that for now. I will see her again in three months and I will repeat all the blood tests again a week before next appointment. She is staying asymptomatic from her multiple myeloma. She has mild neuropathy in her right upper extremity, but it is less likely to be related to revlimid. body. She has mild anxiety. Denies depression. The rest of the systems are unremarkable. Vital Signs:  BP (!) 157/70 (Site: Right Upper Arm, Position: Sitting, Cuff Size: Medium Adult)   Pulse 62   Temp 97.8 °F (36.6 °C) (Infrared)   Ht 5' 4.5\" (1.638 m)   Wt 162 lb (73.5 kg)   SpO2 100%   BMI 27.38 kg/m²     Physical Exam:  CONSTITUTIONAL: awake, alert, cooperative, no apparent distress   EYES: pupils equal, round and reactive to light, sclera clear and conjunctiva normal  ENT: Normocephalic, without obvious abnormality, atraumatic  NECK: supple, symmetrical, no jugular venous distension and no carotid bruits   HEMATOLOGIC/LYMPHATIC: no cervical, supraclavicular or axillary lymphadenopathy   LUNGS: VBS, no rhonchi, no increased work of breathing and clear to auscultation, no wheezes, no crackles,    CARDIOVASCULAR: regular rate and rhythm, normal S1 and S2, no murmur noted  ABDOMEN: soft, non-distended, non-tender, normal bowel sounds x 4, no masses palpated, no hepatosplenomegaly   MUSCULOSKELETAL: full range of motion noted, tone is normal  NEUROLOGIC: awake, alert, oriented to name, place and time. Motor skills grossly intact. SKIN: Normal skin color, texture, turgor and no jaundice.  appears intact   EXTREMITIES: no leg swelling, no cyanosis, no LE edema, no clubbing     Labs:  Hematology:  Lab Results   Component Value Date    WBC 2.3 (L) 01/22/2021    RBC 3.90 (L) 01/22/2021    HGB 12.6 01/22/2021    HCT 36.7 (L) 01/22/2021    MCV 94.1 01/22/2021    MCH 32.3 (H) 01/22/2021    MCHC 34.3 01/22/2021    RDW 15.0 (H) 01/22/2021     01/22/2021    MPV 11.0 01/22/2021    BANDSPCT 4 (L) 09/17/2015    SEGSPCT 54.1 01/22/2021    EOSRELPCT 4.4 (H) 01/22/2021    BASOPCT 0.9 01/22/2021    LYMPHOPCT 22.7 (L) 01/22/2021    MONOPCT 17.9 (H) 01/22/2021    BANDABS 0.17 09/17/2015    SEGSABS 1.2 01/22/2021    EOSABS 0.1 01/22/2021    BASOSABS 0.0 01/22/2021    LYMPHSABS 0.5 01/22/2021    MONOSABS 0.4 01/22/2021 DIFFTYPE AUTOMATED DIFFERENTIAL 01/22/2021    [facility] RARE 09/17/2015     Lab Results   Component Value Date    ESR 12 01/22/2021     Chemistry:  Lab Results   Component Value Date     (L) 01/22/2021    K 3.7 01/22/2021    CL 96 (L) 01/22/2021    CO2 28 01/22/2021    BUN 12 01/22/2021    CREATININE 1.0 01/22/2021    GLUCOSE 96 01/22/2021    CALCIUM 8.9 01/22/2021    PROT 6.2 (L) 01/22/2021    PROT 6.2 (L) 01/22/2021    LABALBU 3.9 01/22/2021    BILITOT 0.6 01/22/2021    ALKPHOS 60 01/22/2021    AST 22 01/22/2021    ALT 22 01/22/2021    LABGLOM 56 (L) 01/22/2021    GFRAA >60 01/22/2021    AGRATIO 0.3 (L) 09/09/2015    GLOB 10.1 09/09/2015    PHOS 3.8 10/09/2015    MG 2.0 10/09/2015     Lab Results   Component Value Date     01/22/2021     No components found for: LD  Lab Results   Component Value Date    TSHHS 2.120 07/05/2018     Immunology:  Lab Results   Component Value Date    PROT 6.2 (L) 01/22/2021    PROT 6.2 (L) 01/22/2021    SPEP  01/22/2021     INTERPRETATION - Monoclonal gammopathy, 200 mg/dL of IgG kappa, has remained stable since 4/22/2020. A possible second faint  M spike is noted which cannot be easily delineated. Slightly decreased total proteins. LP.    SPEP  01/22/2021     INTERPRETATION - The previously identified monoclonal IgG kappa and a faint second possible monoclonal IgG kappa are identified.   LP.    ALBUMINELP 3.5 01/22/2021    LABALPH 0.2 01/22/2021    LABALPH 0.5 01/22/2021    LABBETA 0.9 01/22/2021    GAMGLOB 1.1 01/22/2021     Lab Results   Component Value Date    KAPPAUVOL 28.63 (H) 01/22/2021    LAMBDAUVOL 21.69 01/22/2021    KLFLCR 1.32 01/22/2021     Lab Results   Component Value Date    B2M 2.1 01/22/2021     Coagulation Panel:  No results found for: PROTIME, INR, APTT, DDIMER  Anemia Panel:  No results found for: [id number], FOLATE  Tumor Markers:  No results found for: , CEA, , LABCA2, PSA  Observations:  PHQ-9 Total Score: 0 (1/29/2021 10:14 AM) Assessment & Plan:   1. Symptomatic IgG Kappa multiple myeloma. 2.         Normocytic normochromic anemia - due to multiple myeloma. PLAN:  Ms. Dante Keys has been followed for IgG Kappa multiple myeloma. On January 29, 2021, she presented to me for followup. I have been following Ms. Serrano for IgG Kappa multiple myeloma and she is currently on maintenance chemotherapy with Revlimid (10 mg daily) since December 6, 2018. She is tolerating revlimid very well and she doesn't encounter any major side effects from it. I recognize that her monoclonal protein was 200mg/dl on 1/22/21. It was 200 mg/dl on October 23, 2020, very small to measure on [facility] on 7/20/20, 200 mg/dl on 4/22/2020, 200 mg/dl on 1/21/20, 430 mg/dl on 10/23/19, 200 mg on 7/23/19,  400mg on 4/23/19, 1000mg on  1/22/19 and 2100 mg/dL on blood test done on November 20, 2018. I believe she is responding very well to Revlimid and I recommend her to continue with that for now. I will see her again in three months and I will repeat all the blood tests again a week before next appointment. She is staying asymptomatic from her multiple myeloma. She has mild neuropathy in her right upper extremity, but it is less likely to be related to revlimid. Will continue to monitor it for now. Osteoporosis - her primary care physician started fosamax. I recommend her to continue with that for now. Chemo induced neutropenia - I recommend her to hold chemo for about 2 weeks and then to resume with same dose of revlimid. COVID vaccine - I recommend her to have COVID19 vaccine whenever available (as recommended by NCCN guidelines). VTE prophylaxis - continue with aspirin 81 mg daily. Hypertension - on atenolol. SBP is mildly elevated. Recommend salt restriction. Chemo induced neuropathy - stable. To continue with cymbalta 60 mg daily. I answered all her questions and concerns for today.  I asked her to follow up with her primary care physician on regular basis. Recent imaging and labs were reviewed and discussed with the patient.

## 2021-02-15 DIAGNOSIS — C90.00 MULTIPLE MYELOMA, REMISSION STATUS UNSPECIFIED (HCC): Primary | ICD-10-CM

## 2021-02-15 RX ORDER — LENALIDOMIDE 10 MG/1
10 CAPSULE ORAL DAILY
Qty: 28 CAPSULE | Refills: 0 | Status: SHIPPED | OUTPATIENT
Start: 2021-02-15 | End: 2021-03-17 | Stop reason: SDUPTHER

## 2021-02-15 RX ORDER — LENALIDOMIDE 10 MG/1
10 CAPSULE ORAL DAILY
Qty: 28 CAPSULE | Refills: 0 | Status: SHIPPED | OUTPATIENT
Start: 2021-02-15 | End: 2021-02-15 | Stop reason: SDUPTHER

## 2021-02-23 ENCOUNTER — CLINICAL DOCUMENTATION (OUTPATIENT)
Dept: ONCOLOGY | Age: 66
End: 2021-02-23

## 2021-02-23 NOTE — PROGRESS NOTES
Mammogram schedule at [facility] 3/1/2021 10:00 arrival.  I called and lvm with apt information on home and cell phone.

## 2021-02-24 DIAGNOSIS — I10 ESSENTIAL HYPERTENSION: ICD-10-CM

## 2021-02-24 RX ORDER — ATENOLOL 50 MG/1
TABLET ORAL
Qty: 90 TABLET | Refills: 0 | Status: SHIPPED | OUTPATIENT
Start: 2021-02-24 | End: 2021-05-24

## 2021-02-25 RX ORDER — DULOXETIN HYDROCHLORIDE 60 MG/1
CAPSULE, DELAYED RELEASE ORAL
Qty: 60 CAPSULE | Refills: 3 | Status: SHIPPED | OUTPATIENT
Start: 2021-02-25 | End: 2021-06-23

## 2021-03-01 ENCOUNTER — HOSPITAL ENCOUNTER (OUTPATIENT)
Dept: WOMENS IMAGING | Age: 66
Discharge: HOME | End: 2021-03-01
Payer: MEDICARE

## 2021-03-01 DIAGNOSIS — Z12.31 SCREENING MAMMOGRAM FOR HIGH-RISK PATIENT: ICD-10-CM

## 2021-03-01 PROCEDURE — 77063 BREAST TOMOSYNTHESIS BI: CPT

## 2021-03-17 DIAGNOSIS — C90.00 MULTIPLE MYELOMA, REMISSION STATUS UNSPECIFIED (HCC): Primary | ICD-10-CM

## 2021-03-17 RX ORDER — LENALIDOMIDE 10 MG/1
10 CAPSULE ORAL DAILY
Qty: 28 CAPSULE | Refills: 0 | Status: SHIPPED | OUTPATIENT
Start: 2021-03-17 | End: 2021-04-20 | Stop reason: SDUPTHER

## 2021-04-20 DIAGNOSIS — C90.00 MULTIPLE MYELOMA, REMISSION STATUS UNSPECIFIED (HCC): ICD-10-CM

## 2021-04-20 DIAGNOSIS — C90.00 MULTIPLE MYELOMA, REMISSION STATUS UNSPECIFIED (HCC): Primary | ICD-10-CM

## 2021-04-20 RX ORDER — LENALIDOMIDE 10 MG/1
10 CAPSULE ORAL DAILY
Qty: 28 CAPSULE | Refills: 0 | Status: SHIPPED | OUTPATIENT
Start: 2021-04-20 | End: 2021-05-17 | Stop reason: SDUPTHER

## 2021-04-22 ENCOUNTER — HOSPITAL ENCOUNTER (OUTPATIENT)
Dept: INFUSION THERAPY | Age: 66
Discharge: HOME | End: 2021-04-22
Payer: MEDICARE

## 2021-04-22 DIAGNOSIS — C90.00 MULTIPLE MYELOMA, REMISSION STATUS UNSPECIFIED (HCC): ICD-10-CM

## 2021-04-22 LAB
ALBUMIN SERPL-MCNC: 4.2 GM/DL (ref 3.4–5)
ALP BLD-CCNC: 50 IU/L (ref 40–129)
ALT SERPL-CCNC: 26 U/L (ref 10–40)
ANION GAP SERPL CALCULATED.3IONS-SCNC: 3 MMOL/L (ref 4–16)
AST SERPL-CCNC: 24 IU/L (ref 15–37)
BASOPHILS ABSOLUTE: 0 K/CU MM
BASOPHILS RELATIVE PERCENT: 1 % (ref 0–1)
BILIRUB SERPL-MCNC: 0.7 MG/DL (ref 0–1)
BUN BLDV-MCNC: 13 MG/DL (ref 6–23)
CALCIUM SERPL-MCNC: 8.9 MG/DL (ref 8.3–10.6)
CHLORIDE BLD-SCNC: 98 MMOL/L (ref 99–110)
CO2: 32 MMOL/L (ref 21–32)
CREAT SERPL-MCNC: 1.1 MG/DL (ref 0.6–1.1)
DIFFERENTIAL TYPE: ABNORMAL
EOSINOPHILS ABSOLUTE: 0.1 K/CU MM
EOSINOPHILS RELATIVE PERCENT: 6.2 % (ref 0–3)
ERYTHROCYTE SEDIMENTATION RATE: 2 MM/HR (ref 0–30)
GFR AFRICAN AMERICAN: >60 ML/MIN/1.73M2
GFR NON-AFRICAN AMERICAN: 50 ML/MIN/1.73M2
GLUCOSE BLD-MCNC: 106 MG/DL (ref 70–99)
HCT VFR BLD CALC: 37.6 % (ref 37–47)
HEMOGLOBIN: 12.9 GM/DL (ref 12.5–16)
IGA: 178 MG/DL (ref 69–382)
IGG,SERUM: 977 MG/DL (ref 723–1685)
IGM,SERUM: 51 MG/DL (ref 62–277)
LACTATE DEHYDROGENASE: 152 IU/L (ref 120–246)
LYMPHOCYTES ABSOLUTE: 0.4 K/CU MM
LYMPHOCYTES RELATIVE PERCENT: 20.6 % (ref 24–44)
MCH RBC QN AUTO: 31.9 PG (ref 27–31)
MCHC RBC AUTO-ENTMCNC: 34.3 % (ref 32–36)
MCV RBC AUTO: 92.8 FL (ref 78–100)
MONOCYTES ABSOLUTE: 0.3 K/CU MM
MONOCYTES RELATIVE PERCENT: 17 % (ref 0–4)
PDW BLD-RTO: 14.7 % (ref 11.7–14.9)
PLATELET # BLD: 153 K/CU MM (ref 140–440)
PMV BLD AUTO: 11.1 FL (ref 7.5–11.1)
POTASSIUM SERPL-SCNC: 4 MMOL/L (ref 3.5–5.1)
RBC # BLD: 4.05 M/CU MM (ref 4.2–5.4)
SEGMENTED NEUTROPHILS ABSOLUTE COUNT: 1.1 K/CU MM
SEGMENTED NEUTROPHILS RELATIVE PERCENT: 55.2 % (ref 36–66)
SODIUM BLD-SCNC: 133 MMOL/L (ref 135–145)
TOTAL PROTEIN: 6.2 GM/DL (ref 6.4–8.2)
WBC # BLD: 1.9 K/CU MM (ref 4–10.5)

## 2021-04-22 PROCEDURE — 83615 LACTATE (LD) (LDH) ENZYME: CPT

## 2021-04-22 PROCEDURE — 36415 COLL VENOUS BLD VENIPUNCTURE: CPT

## 2021-04-22 PROCEDURE — 84165 PROTEIN E-PHORESIS SERUM: CPT

## 2021-04-22 PROCEDURE — 85025 COMPLETE CBC W/AUTO DIFF WBC: CPT

## 2021-04-22 PROCEDURE — 85652 RBC SED RATE AUTOMATED: CPT

## 2021-04-22 PROCEDURE — 82232 ASSAY OF BETA-2 PROTEIN: CPT

## 2021-04-22 PROCEDURE — 80053 COMPREHEN METABOLIC PANEL: CPT

## 2021-04-22 PROCEDURE — 82784 ASSAY IGA/IGD/IGG/IGM EACH: CPT

## 2021-04-22 PROCEDURE — 86320 SERUM IMMUNOELECTROPHORESIS: CPT

## 2021-04-23 LAB
ALBUMIN ELP: 3.6 GM/DL (ref 3.2–5.6)
ALPHA-1-GLOBULIN: 0.2 GM/DL (ref 0.1–0.4)
ALPHA-2-GLOBULIN: 0.6 GM/DL (ref 0.4–1.2)
BETA GLOBULIN: 0.8 GM/DL (ref 0.5–1.3)
GAMMA GLOBULIN: 1 GM/DL (ref 0.5–1.6)
SPEP INTERPRETATION: ABNORMAL
SPEP INTERPRETATION: NORMAL
TOTAL PROTEIN: 6.2 GM/DL (ref 6.4–8.2)

## 2021-04-24 LAB — BETA-2 MICROGLOBULIN: 2 MG/L (ref 1.1–2.4)

## 2021-04-29 ENCOUNTER — OFFICE VISIT (OUTPATIENT)
Dept: ONCOLOGY | Age: 66
End: 2021-04-29
Payer: MEDICARE

## 2021-04-29 ENCOUNTER — HOSPITAL ENCOUNTER (OUTPATIENT)
Dept: INFUSION THERAPY | Age: 66
Discharge: HOME | End: 2021-04-29
Payer: MEDICARE

## 2021-04-29 VITALS
SYSTOLIC BLOOD PRESSURE: 147 MMHG | DIASTOLIC BLOOD PRESSURE: 67 MMHG | HEART RATE: 61 BPM | RESPIRATION RATE: 16 BRPM | BODY MASS INDEX: 27.69 KG/M2 | TEMPERATURE: 96.9 F | OXYGEN SATURATION: 98 % | WEIGHT: 162.2 LBS | HEIGHT: 64 IN

## 2021-04-29 DIAGNOSIS — C90.00 MULTIPLE MYELOMA NOT HAVING ACHIEVED REMISSION (HCC): ICD-10-CM

## 2021-04-29 DIAGNOSIS — C90.00 MULTIPLE MYELOMA NOT HAVING ACHIEVED REMISSION (HCC): Primary | ICD-10-CM

## 2021-04-29 LAB
BASOPHILS ABSOLUTE: 0 K/CU MM
BASOPHILS RELATIVE PERCENT: 1.4 % (ref 0–1)
DIFFERENTIAL TYPE: ABNORMAL
EOSINOPHILS ABSOLUTE: 0.1 K/CU MM
EOSINOPHILS RELATIVE PERCENT: 2.4 % (ref 0–3)
HCT VFR BLD CALC: 35.6 % (ref 37–47)
HEMOGLOBIN: 12.3 GM/DL (ref 12.5–16)
LYMPHOCYTES ABSOLUTE: 0.4 K/CU MM
LYMPHOCYTES RELATIVE PERCENT: 20.8 % (ref 24–44)
MCH RBC QN AUTO: 32 PG (ref 27–31)
MCHC RBC AUTO-ENTMCNC: 34.6 % (ref 32–36)
MCV RBC AUTO: 92.7 FL (ref 78–100)
MONOCYTES ABSOLUTE: 0.4 K/CU MM
MONOCYTES RELATIVE PERCENT: 18.9 % (ref 0–4)
PDW BLD-RTO: 14.6 % (ref 11.7–14.9)
PLATELET # BLD: 158 K/CU MM (ref 140–440)
PMV BLD AUTO: 10.1 FL (ref 7.5–11.1)
RBC # BLD: 3.84 M/CU MM (ref 4.2–5.4)
SEGMENTED NEUTROPHILS ABSOLUTE COUNT: 1.2 K/CU MM
SEGMENTED NEUTROPHILS RELATIVE PERCENT: 56.5 % (ref 36–66)
WBC # BLD: 2.1 K/CU MM (ref 4–10.5)

## 2021-04-29 PROCEDURE — 36415 COLL VENOUS BLD VENIPUNCTURE: CPT

## 2021-04-29 PROCEDURE — 99211 OFF/OP EST MAY X REQ PHY/QHP: CPT

## 2021-04-29 PROCEDURE — 99214 OFFICE O/P EST MOD 30 MIN: CPT | Performed by: INTERNAL MEDICINE

## 2021-04-29 PROCEDURE — 85025 COMPLETE CBC W/AUTO DIFF WBC: CPT

## 2021-04-29 NOTE — PROGRESS NOTES
MA Rooming Questions  Patient: Katy Ormond  MRN: [id number]    Date: 4/29/2021        1. Do you have any new issues?   no         2. Do you need any refills on medications?    no    3. Have you had any imaging done since your last visit?   no    4. Have you been hospitalized or seen in the emergency room since your last visit here?   no    5. Did the patient have a depression screening completed today?  No    No data recorded     PHQ-9 Given to (if applicable):               PHQ-9 Score (if applicable):                     [] Positive     []  Negative              Does question #9 need addressed (if applicable)                     [] Yes    []  No               All Cat CMA

## 2021-04-29 NOTE — PROGRESS NOTES
protein (total protein 14.1), normal calcium (calcium 9.9), slight elevation in serum creatinine (creatinine 1.2), elevated Beta-2 microglobulin (6.5), and elevated ESR (more than 120). Serum protein electrophoresis and immunofixation was sent on September 14, 2015, and it showed monoclonal IgG Kappa protein of 6.8 gram/dL. Serum Kappa light chain was 16.9 and serum Lambda light chain was less than 0.16. I did bone marrow biopsy on September 19, 2015, and the bone marrow aspirate flow cytometry showed 38 percent plasma cell population consistent with plasma cell neoplasm. Bone marrow biopsy  stain was about 98 percent positive for  positive plasma cell. 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 Gain of 1q has been associated with advanced disease and it is considered a high risk indicator. Cytogenetic studies showed normal female karyotype (46XX). Ms. Naz Busch had a PET-CT scan on October 1, 2015, and it showed no metabolically active lytic lesions. Focal skin thickening in the mid medial right cuff with associated metabolic activity. This is non-specific and can be seen with infectious or inflammatory process; however, given the history of melanoma, recommend correlation with direct visualization. Ms. Naz Busch was started with chemotherapy (bortezomib, cyclophosphamide, and dexamethasone) on October 2, 2015. Ms. Naz Busch was seen by Dr. Cid at [facility] for possible autologous stem cell transplantation. Dr. Cid and Ms. Serrano decided to have stem cell collection for future use. Stem cell was collected on the first week of March 2016. Our plan was to continue with current chemotherapy followed by maintenance chemotherapy.  Dr. Cid will consider autologous stem cell transplantation when her disease becomes refractory or progressed. Ms. Rk Rodriguez completed her eight cycles of chemotherapy with cyclophosphamide, bortezomib, and dexamethasone on July 8, 2016. Maintenance chemotherapy with Velcade and dexamethasone was started on August 2, 2016 and she completed it on June 12, 2018. I recognized that her monoclonal protein went up to 1100 mg/dL on recent blood test done on July 18, 2018. She was subsequently evaluated by Dr. Will Guo and she believes it is due to stopping velcade. She recommend to resume maintenance chemotherapy. She prefers Nilaro maintenance over velcade because of ease of administration. Maintenance chemotherapy with Jennett Poncho was started on August 20, 2018 and we stopped it on November 27, 2018 since she has biochemical progression of the disease. We decided to change it to maintenance chemotherapy with Revlimid. Revlimid 10 mg daily was started on December 6, 2018. On April 29, 2021, she presented to me for followup. I have been following Ms. Serrano for IgG Kappa multiple myeloma and she is currently on maintenance chemotherapy with Revlimid (10 mg daily) since December 6, 2018. She is tolerating revlimid very well and she doesn't encounter any major side effects from it. I recognize that her monoclonal protein was 300 mg/dl on 4/22/21. It was 200mg/dl on 1/22/21, 200 mg/dl on October 23, 2020, very small to measure on [facility] on 7/20/20, 200 mg/dl on 4/22/2020, 200 mg/dl on 1/21/20, 430 mg/dl on 10/23/19, 200 mg on 7/23/19,  400mg on 4/23/19, 1000mg on  1/22/19 and 2100 mg/dL on blood test done on November 20, 2018. I believe she is responding very well to Revlimid and I recommend her to continue with that for now. I will see her again in three months and I will repeat all the blood tests again a week before next appointment. She is staying asymptomatic from her multiple myeloma.  She has mild neuropathy in her right upper extremity, but it is less likely to be related to revlimid. Will continue to monitor it for now. Osteoporosis - her primary care physician started fosamax. I recommend her to continue with that for now. Chemo induced neutropenia - I recommend her to hold chemo since 4/22/21 because of neutropenia. Will check CBC today. If her [address] is back to normal, I yeni resume with same dose of revlimid. COVID vaccine - She already received her COVID19 vaccine. VTE prophylaxis - I recommend her to continue with aspirin 81 mg daily. Hypertension - She is on atenolol. SBP is mildly elevated. Recommend salt restriction. Chemo induced neuropathy - stable. To continue with cymbalta 60 mg daily. Health maintenance - I recommend her to have age-appropriate cancer screening, exercise, low-fat and low-sodium diet. Besides that, she does not have any other significant symptoms on  today's visit. PAST MEDICAL HISTORY:  Significant for:  1. Hypertension. 2.         Melanoma. 3.         Anxiety disorder. PAST SURGICAL HISTORY:  Significant for:  1. Total abdominal hysterectomy in 1999.  2.         Left lower extremity excision and biopsy for melanoma in 2006. 3.         Excision and biopsy of right shoulder melanoma in 2010. FAMILY HISTORY:  Significant for breast cancer in her mother. No other family history of cancer disease. SOCIAL HISTORY:  She denies smoking and illicit drug abuse. She socially drinks alcohol. She is  and she is currently living in [facility]. ALLERGIES:  No known drug allergies. Oncology History    No history exists. Review of Systems: \"Per interval history; otherwise 10 point ROS is negative. \"  Her energy level is stable, appetite, and sleep are pretty good. She denies fever, chills, night sweats, cough, shortness of breath, chest pain, hemoptysis, or palpitations.  Her bowel and bladder functions are normal. She denies nausea, vomiting, abdominal pain, diarrhea, constipation, dysuria, loss of appetite, or weight loss. She has mild, stable neuropathy in her right upper extremity. She doesn't have bleeding or clotting issues. She doesn't have any pain in her body. She has mild anxiety. She doesn't have depression. The rest of the systems are unremarkable. Vital Signs:  BP (!) 147/67 (Site: Right Upper Arm, Position: Sitting, Cuff Size: Medium Adult)   Pulse 61   Temp 96.9 °F (36.1 °C) (Temporal)   Resp 16   Ht 5' 4\" (1.626 m)   Wt 162 lb 3.2 oz (73.6 kg)   SpO2 98%   BMI 27.84 kg/m²     Physical Exam:  CONSTITUTIONAL: awake, alert, cooperative, no apparent distress   EYES: pupils equal, round and reactive to light, sclera clear and conjunctiva normal  ENT: Normocephalic, without obvious abnormality, atraumatic  NECK: supple, symmetrical, no jugular venous distension and no carotid bruits   HEMATOLOGIC/LYMPHATIC: no cervical, supraclavicular or axillary lymphadenopathy   LUNGS: VBS, no rhonchi, no increased work of breathing, no wheezes, clear to auscultation, no crackles,    CARDIOVASCULAR: regular rate and rhythm, normal S1 and S2, no murmur noted  ABDOMEN: soft, non-distended, non-tender, normal bowel sounds x 4, no masses palpated, no hepatosplenomegaly   MUSCULOSKELETAL: full range of motion noted, tone is normal  NEUROLOGIC: awake, alert, oriented to name, place and time. Motor skills grossly intact. SKIN: Normal skin color, texture, turgor and no jaundice.  appears intact   EXTREMITIES: no cyanosis, no LE edema, no leg swelling, no clubbing     Labs:  Hematology:  Lab Results   Component Value Date    WBC 1.9 (LL) 04/22/2021    RBC 4.05 (L) 04/22/2021    HGB 12.9 04/22/2021    HCT 37.6 04/22/2021    MCV 92.8 04/22/2021    MCH 31.9 (H) 04/22/2021    MCHC 34.3 04/22/2021    RDW 14.7 04/22/2021     04/22/2021    MPV 11.1 04/22/2021    BANDSPCT 4 (L) 09/17/2015    SEGSPCT 55.2 04/22/2021    EOSRELPCT 6.2 (H) 04/22/2021    BASOPCT 1.0 04/22/2021 LYMPHOPCT 20.6 (L) 04/22/2021    MONOPCT 17.0 (H) 04/22/2021    BANDABS 0.17 09/17/2015    SEGSABS 1.1 04/22/2021    EOSABS 0.1 04/22/2021    BASOSABS 0.0 04/22/2021    LYMPHSABS 0.4 04/22/2021    MONOSABS 0.3 04/22/2021    DIFFTYPE AUTOMATED DIFFERENTIAL 04/22/2021    [facility] RARE 09/17/2015     Lab Results   Component Value Date    ESR 2 04/22/2021     Chemistry:  Lab Results   Component Value Date     (L) 04/22/2021    K 4.0 04/22/2021    CL 98 (L) 04/22/2021    CO2 32 04/22/2021    BUN 13 04/22/2021    CREATININE 1.1 04/22/2021    GLUCOSE 106 (H) 04/22/2021    CALCIUM 8.9 04/22/2021    PROT 6.2 (L) 04/22/2021    PROT 6.2 (L) 04/22/2021    LABALBU 4.2 04/22/2021    BILITOT 0.7 04/22/2021    ALKPHOS 50 04/22/2021    AST 24 04/22/2021    ALT 26 04/22/2021    LABGLOM 50 (L) 04/22/2021    GFRAA >60 04/22/2021    AGRATIO 0.3 (L) 09/09/2015    GLOB 10.1 09/09/2015    PHOS 3.8 10/09/2015    MG 2.0 10/09/2015     Lab Results   Component Value Date     04/22/2021     No components found for: LD  Lab Results   Component Value Date    TSHHS 2.120 07/05/2018     Immunology:  Lab Results   Component Value Date    PROT 6.2 (L) 04/22/2021    PROT 6.2 (L) 04/22/2021    SPEP  04/22/2021     INTERPRETATION - An IgG kappa monoclonal band is identified. SAF    SPEP  04/22/2021     INTERPRETATION - Monoclonal gammopathy, IgG kappa, 300 mg/dL, increased from 200 mg/dL on 1/22/2021.  SAF    ALBUMINELP 3.6 04/22/2021    LABALPH 0.2 04/22/2021    LABALPH 0.6 04/22/2021    LABBETA 0.8 04/22/2021    GAMGLOB 1.0 04/22/2021     Lab Results   Component Value Date    KAPPAUVOL 28.63 (H) 01/22/2021    LAMBDAUVOL 24.98 10/23/2020    KLFLCR 1.32 01/22/2021     Lab Results   Component Value Date    B2M 2.0 04/22/2021     Coagulation Panel:  No results found for: PROTIME, INR, APTT, DDIMER  Anemia Panel:  No results found for: [id number], FOLATE  Tumor Markers:  No results found for: , CEA, , LABCA2, PSA  Observations:  No data recorded        Assessment & Plan:   1. Symptomatic IgG Kappa multiple myeloma. 2.         Normocytic normochromic anemia - due to multiple myeloma. PLAN:  Ms. Arjun Moore has been followed for IgG Kappa multiple myeloma. On April 29, 2021, she presented to me for followup. I have been following Ms. Serrano for IgG Kappa multiple myeloma and she is currently on maintenance chemotherapy with Revlimid (10 mg daily) since December 6, 2018. She is tolerating revlimid very well and she doesn't encounter any major side effects from it. I recognize that her monoclonal protein was 300 mg/dl on 4/22/21. It was 200mg/dl on 1/22/21, 200 mg/dl on October 23, 2020, very small to measure on [facility] on 7/20/20, 200 mg/dl on 4/22/2020, 200 mg/dl on 1/21/20, 430 mg/dl on 10/23/19, 200 mg on 7/23/19,  400mg on 4/23/19, 1000mg on  1/22/19 and 2100 mg/dL on blood test done on November 20, 2018. I believe she is responding very well to Revlimid and I recommend her to continue with that for now. I will see her again in three months and I will repeat all the blood tests again a week before next appointment. She is staying asymptomatic from her multiple myeloma. She has mild neuropathy in her right upper extremity, but it is less likely to be related to revlimid. Will continue to monitor it for now. Osteoporosis - her primary care physician started fosamax. I recommend her to continue with that for now. Chemo induced neutropenia - I recommend her to hold chemo since 4/22/21 because of neutropenia. Will check CBC today. If her [address] is back to normal, I yeni resume with same dose of revlimid. COVID vaccine - She already received her COVID19 vaccine. VTE prophylaxis - I recommend her to continue with aspirin 81 mg daily. Hypertension - She is on atenolol. SBP is mildly elevated. Recommend salt restriction. Chemo induced neuropathy - stable. To continue with cymbalta 60 mg daily.       Health maintenance - I recommend her to have age-appropriate cancer screening, exercise, low-fat and low-sodium diet. I answered all her questions and concerns for today. I asked her to follow up with her primary care physician on regular basis. Recent imaging and labs were reviewed and discussed with the patient.

## 2021-05-17 DIAGNOSIS — C90.00 MULTIPLE MYELOMA, REMISSION STATUS UNSPECIFIED (HCC): Primary | ICD-10-CM

## 2021-05-17 RX ORDER — LENALIDOMIDE 10 MG/1
10 CAPSULE ORAL DAILY
Qty: 28 CAPSULE | Refills: 0 | Status: SHIPPED | OUTPATIENT
Start: 2021-05-17 | End: 2021-06-15 | Stop reason: SDUPTHER

## 2021-05-24 DIAGNOSIS — I10 ESSENTIAL HYPERTENSION: ICD-10-CM

## 2021-05-24 RX ORDER — ATENOLOL 50 MG/1
TABLET ORAL
Qty: 90 TABLET | Refills: 0 | Status: SHIPPED | OUTPATIENT
Start: 2021-05-24 | End: 2021-08-23

## 2021-06-15 DIAGNOSIS — C90.00 MULTIPLE MYELOMA, REMISSION STATUS UNSPECIFIED (HCC): Primary | ICD-10-CM

## 2021-06-15 RX ORDER — LENALIDOMIDE 10 MG/1
10 CAPSULE ORAL DAILY
Qty: 28 CAPSULE | Refills: 0 | Status: SHIPPED | OUTPATIENT
Start: 2021-06-15 | End: 2021-07-12 | Stop reason: SDUPTHER

## 2021-07-12 DIAGNOSIS — C90.00 MULTIPLE MYELOMA, REMISSION STATUS UNSPECIFIED (HCC): Primary | ICD-10-CM

## 2021-07-12 RX ORDER — LENALIDOMIDE 10 MG/1
10 CAPSULE ORAL DAILY
Qty: 28 CAPSULE | Refills: 0 | Status: SHIPPED | OUTPATIENT
Start: 2021-07-12 | End: 2021-08-09 | Stop reason: SDUPTHER

## 2021-07-22 ENCOUNTER — HOSPITAL ENCOUNTER (OUTPATIENT)
Dept: INFUSION THERAPY | Age: 66
Discharge: HOME | End: 2021-07-22
Payer: MEDICARE

## 2021-07-22 DIAGNOSIS — C90.00 MULTIPLE MYELOMA NOT HAVING ACHIEVED REMISSION (HCC): ICD-10-CM

## 2021-07-22 LAB
ALBUMIN SERPL-MCNC: 4.4 GM/DL (ref 3.4–5)
ALP BLD-CCNC: 59 IU/L (ref 40–129)
ALT SERPL-CCNC: 21 U/L (ref 10–40)
ANION GAP SERPL CALCULATED.3IONS-SCNC: 8 MMOL/L (ref 4–16)
AST SERPL-CCNC: 19 IU/L (ref 15–37)
BASOPHILS ABSOLUTE: 0 K/CU MM
BASOPHILS RELATIVE PERCENT: 2 % (ref 0–1)
BILIRUB SERPL-MCNC: 0.5 MG/DL (ref 0–1)
BUN BLDV-MCNC: 13 MG/DL (ref 6–23)
CALCIUM SERPL-MCNC: 8.6 MG/DL (ref 8.3–10.6)
CHLORIDE BLD-SCNC: 98 MMOL/L (ref 99–110)
CO2: 29 MMOL/L (ref 21–32)
CREAT SERPL-MCNC: 1 MG/DL (ref 0.6–1.1)
DIFFERENTIAL TYPE: ABNORMAL
EOSINOPHILS ABSOLUTE: 0.1 K/CU MM
EOSINOPHILS RELATIVE PERCENT: 5.4 % (ref 0–3)
ERYTHROCYTE SEDIMENTATION RATE: 5 MM/HR (ref 0–30)
GFR AFRICAN AMERICAN: >60 ML/MIN/1.73M2
GFR NON-AFRICAN AMERICAN: 55 ML/MIN/1.73M2
GLUCOSE BLD-MCNC: 87 MG/DL (ref 70–99)
HCT VFR BLD CALC: 36.1 % (ref 37–47)
HEMOGLOBIN: 12.6 GM/DL (ref 12.5–16)
IGA: 150 MG/DL (ref 69–382)
IGG,SERUM: 1008 MG/DL (ref 723–1685)
IGM,SERUM: 39 MG/DL (ref 62–277)
LACTATE DEHYDROGENASE: 144 IU/L (ref 120–246)
LYMPHOCYTES ABSOLUTE: 0.4 K/CU MM
LYMPHOCYTES RELATIVE PERCENT: 19.5 % (ref 24–44)
MCH RBC QN AUTO: 32.6 PG (ref 27–31)
MCHC RBC AUTO-ENTMCNC: 34.9 % (ref 32–36)
MCV RBC AUTO: 93.5 FL (ref 78–100)
MONOCYTES ABSOLUTE: 0.4 K/CU MM
MONOCYTES RELATIVE PERCENT: 17.1 % (ref 0–4)
PDW BLD-RTO: 14.9 % (ref 11.7–14.9)
PLATELET # BLD: 152 K/CU MM (ref 140–440)
PMV BLD AUTO: 10.3 FL (ref 7.5–11.1)
POTASSIUM SERPL-SCNC: 3.9 MMOL/L (ref 3.5–5.1)
RBC # BLD: 3.86 M/CU MM (ref 4.2–5.4)
SEGMENTED NEUTROPHILS ABSOLUTE COUNT: 1.2 K/CU MM
SEGMENTED NEUTROPHILS RELATIVE PERCENT: 56 % (ref 36–66)
SODIUM BLD-SCNC: 135 MMOL/L (ref 135–145)
TOTAL PROTEIN: 6.3 GM/DL (ref 6.4–8.2)
WBC # BLD: 2.1 K/CU MM (ref 4–10.5)

## 2021-07-22 PROCEDURE — 36415 COLL VENOUS BLD VENIPUNCTURE: CPT

## 2021-07-22 PROCEDURE — 82232 ASSAY OF BETA-2 PROTEIN: CPT

## 2021-07-22 PROCEDURE — 84165 PROTEIN E-PHORESIS SERUM: CPT

## 2021-07-22 PROCEDURE — 85652 RBC SED RATE AUTOMATED: CPT

## 2021-07-22 PROCEDURE — 86320 SERUM IMMUNOELECTROPHORESIS: CPT

## 2021-07-22 PROCEDURE — 83615 LACTATE (LD) (LDH) ENZYME: CPT

## 2021-07-22 PROCEDURE — 82784 ASSAY IGA/IGD/IGG/IGM EACH: CPT

## 2021-07-22 PROCEDURE — 80053 COMPREHEN METABOLIC PANEL: CPT

## 2021-07-22 PROCEDURE — 83883 ASSAY NEPHELOMETRY NOT SPEC: CPT

## 2021-07-22 PROCEDURE — 85025 COMPLETE CBC W/AUTO DIFF WBC: CPT

## 2021-07-22 RX ORDER — DULOXETIN HYDROCHLORIDE 60 MG/1
CAPSULE, DELAYED RELEASE ORAL
Qty: 60 CAPSULE | Refills: 0 | Status: SHIPPED | OUTPATIENT
Start: 2021-07-22 | End: 2021-08-05 | Stop reason: SDUPTHER

## 2021-07-23 LAB
ALBUMIN ELP: 3.6 GM/DL (ref 3.2–5.6)
ALPHA-1-GLOBULIN: 0.2 GM/DL (ref 0.1–0.4)
ALPHA-2-GLOBULIN: 0.6 GM/DL (ref 0.4–1.2)
BETA GLOBULIN: 0.8 GM/DL (ref 0.5–1.3)
GAMMA GLOBULIN: 1 GM/DL (ref 0.5–1.6)
SPEP INTERPRETATION: ABNORMAL
SPEP INTERPRETATION: NORMAL
TOTAL PROTEIN: 6.3 GM/DL (ref 6.4–8.2)

## 2021-07-24 LAB
BETA-2 MICROGLOBULIN: 1.9 MG/L (ref 1.1–2.4)
KAPPA QUANT FREE LIGHT CHAINS: 31.2 MG/L (ref 3.3–19.4)
KAPPA/LAMBDA FREE LIGHT CHAIN RATIO: 1.65 (ref 0.26–1.65)
LAMBDA FREE LIGHT CHAINS QNT: 18.94 MG/L (ref 5.71–26.3)

## 2021-07-29 ENCOUNTER — HOSPITAL ENCOUNTER (OUTPATIENT)
Dept: INFUSION THERAPY | Age: 66
Discharge: HOME | End: 2021-07-29
Payer: MEDICARE

## 2021-07-29 ENCOUNTER — OFFICE VISIT (OUTPATIENT)
Dept: ONCOLOGY | Age: 66
End: 2021-07-29
Payer: MEDICARE

## 2021-07-29 VITALS
DIASTOLIC BLOOD PRESSURE: 60 MMHG | TEMPERATURE: 98.5 F | OXYGEN SATURATION: 95 % | BODY MASS INDEX: 27.59 KG/M2 | HEART RATE: 65 BPM | HEIGHT: 64 IN | WEIGHT: 161.6 LBS | SYSTOLIC BLOOD PRESSURE: 128 MMHG

## 2021-07-29 DIAGNOSIS — C90.00 MULTIPLE MYELOMA, REMISSION STATUS UNSPECIFIED (HCC): Primary | ICD-10-CM

## 2021-07-29 PROCEDURE — 99214 OFFICE O/P EST MOD 30 MIN: CPT | Performed by: INTERNAL MEDICINE

## 2021-07-29 PROCEDURE — 99211 OFF/OP EST MAY X REQ PHY/QHP: CPT

## 2021-07-29 ASSESSMENT — PATIENT HEALTH QUESTIONNAIRE - PHQ9
SUM OF ALL RESPONSES TO PHQ9 QUESTIONS 1 & 2: 0
SUM OF ALL RESPONSES TO PHQ QUESTIONS 1-9: 0
2. FEELING DOWN, DEPRESSED OR HOPELESS: 0
1. LITTLE INTEREST OR PLEASURE IN DOING THINGS: 0

## 2021-07-29 NOTE — PROGRESS NOTES
Patient Name: Marlon Cabot  Patient : 1955  Patient MRN: [id number]     Primary Oncologist: Kanchan Dasilva MD  Referring Provider: Isrrael Bowser MD     Date of Service: 2021      Chief Complaint:   Chief Complaint   Patient presents with    Follow-up     Patient Active Problem List:     Multiple myeloma     HPI:  Rodolfo Adams is a 69-year-old very pleasant female with medical history significant for hypertension, anxiety disorder, and anemia since 2015, initially referred to me for evaluation for her normocytic normochromic anemia. She stated that she has been having right lateral chest wall pain starting May 2015. She had chest x-ray done in 2015 and it showed left sixth rib fracture. Her pain has been controlled with ibuprofen since then. On 2015, she was found to have anemia (hemoglobin 9.3) and she was started with oral iron supplementation. Her anemia got worse on repeat blood test done on 2015, (hemoglobin 8.1). She had chest x-ray and ultrasound of the abdomen on 2015, and they were normal.      She had a colonoscopy by Dr. Susanna Lewis about three to four years ago which was normal according to her. She also had history of melanoma twice in the past (left lower extremity melanoma which was excised in  and right shoulder melanoma which was excised in ). She was also found to have elevated total protein by primary care physician. Because of her normocytic normochromic anemia associated with elevated total protein, she was subsequently referred to me for evaluation of possible plasma cell dyscrasia. She denies fever, night sweats, loss of appetite, and weight loss. She does not have any other significant symptoms except right lower chest wall pain.       Laboratory workups done on 2015, showed persistent normocytic normochromic anemia (hemoglobin 8.2 and MCV 89), persisted elevated total protein (total protein 14.1), normal calcium (calcium 9.9), slight elevation in serum creatinine (creatinine 1.2), elevated Beta-2 microglobulin (6.5), and elevated ESR (more than 120). Serum protein electrophoresis and immunofixation was sent on September 14, 2015, and it showed monoclonal IgG Kappa protein of 6.8 gram/dL. Serum Kappa light chain was 16.9 and serum Lambda light chain was less than 0.16. I did bone marrow biopsy on September 19, 2015, and the bone marrow aspirate flow cytometry showed 38 percent plasma cell population consistent with plasma cell neoplasm. Bone marrow biopsy  stain was about 98 percent positive for  positive plasma cell. 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 Gain of 1q has been associated with advanced disease and it is considered a high risk indicator. Cytogenetic studies showed normal female karyotype (46XX). Ms. Ethan Baires had a PET-CT scan on October 1, 2015, and it showed no metabolically active lytic lesions. Focal skin thickening in the mid medial right cuff with associated metabolic activity. This is non-specific and can be seen with infectious or inflammatory process; however, given the history of melanoma, recommend correlation with direct visualization. Ms. Ethan Baires was started with chemotherapy (bortezomib, cyclophosphamide, and dexamethasone) on October 2, 2015. Ms. Ethan Baires was seen by Dr. Trina Amaya at [facility] for possible autologous stem cell transplantation. Dr. Trina Amaya and Ms. Serrano decided to have stem cell collection for future use. Stem cell was collected on the first week of March 2016. Our plan was to continue with current chemotherapy followed by maintenance chemotherapy.  Dr. Trina Amaya will consider autologous stem cell transplantation when her disease to monitor it for now. Osteoporosis - her primary care physician started fosamax. I recommend her to continue with that for now. Chemo induced neutropenia -  Stable and will continue to monitor it for now. COVID vaccine - She already received her COVID19 vaccine. VTE prophylaxis - I recommend her to continue with aspirin 81 mg daily. Hypertension - She is on atenolol. SBP is normal today. I recommend salt restriction. Chemo induced neuropathy - stable. To continue with cymbalta 60 mg daily. Health maintenance - I recommend her to have age-appropriate cancer screening, exercise, low-fat and low-sodium diet. Besides that, she does not have any other significant symptoms on  today's visit. PAST MEDICAL HISTORY:  Significant for:  1. Hypertension. 2.         Melanoma. 3.         Anxiety disorder. PAST SURGICAL HISTORY:  Significant for:  1. Total abdominal hysterectomy in 1999.  2.         Left lower extremity excision and biopsy for melanoma in 2006. 3.         Excision and biopsy of right shoulder melanoma in 2010. FAMILY HISTORY:  Significant for breast cancer in her mother. No other family history of cancer disease. SOCIAL HISTORY:  She denies smoking and illicit drug abuse. She socially drinks alcohol. She is  and she is currently living in [facility]. ALLERGIES:  No known drug allergies. Oncology History    No history exists. Review of Systems: \"Per interval history; otherwise 10 point ROS is negative. \"  Her energy level is fair, appetite, and sleep are good. She doesn't have fever, chills, night sweats, cough, shortness of breath, chest pain, hemoptysis, or palpitations. Her bowel and bladder functions are normal. She doesn't have nausea, vomiting, abdominal pain, diarrhea, constipation, dysuria, loss of appetite, or weight loss. She has mild stable neuropathy in her right upper extremity. She denies bleeding or clotting issues. She denies any pain in her body. She has mild anxiety. She denies depression. The rest of the systems are unremarkable. Vital Signs:  /60 (Site: Right Upper Arm, Position: Sitting, Cuff Size: Medium Adult)   Pulse 65   Temp 98.5 °F (36.9 °C) (Infrared)   Ht 5' 4\" (1.626 m)   Wt 161 lb 9.6 oz (73.3 kg)   SpO2 95%   BMI 27.74 kg/m²     Physical Exam:  CONSTITUTIONAL: awake, alert, cooperative, no apparent distress   EYES: pupils equal, round and reactive to light, sclera clear and conjunctiva normal  ENT: Normocephalic, without obvious abnormality, atraumatic  NECK: supple, symmetrical, no jugular venous distension and no carotid bruits   HEMATOLOGIC/LYMPHATIC: no cervical, supraclavicular or axillary lymphadenopathy   LUNGS: VBS, no wheezes, clear to auscultation, no crackles, no rhonchi, no increased work of breathing,    CARDIOVASCULAR: regular rate and rhythm, normal S1 and S2, no murmur noted  ABDOMEN: soft, non-distended, non-tender, normal bowel sounds x 4, no masses palpated, no hepatosplenomegaly   MUSCULOSKELETAL: full range of motion noted, tone is normal  NEUROLOGIC: awake, alert, oriented to name, place and time. Motor skills grossly intact. SKIN: Normal skin color, texture, turgor and no jaundice.  appears intact   EXTREMITIES: no cyanosis, no clubbing, no LE edema, no leg swelling,      Labs:  Hematology:  Lab Results   Component Value Date    WBC 2.1 (L) 07/22/2021    RBC 3.86 (L) 07/22/2021    HGB 12.6 07/22/2021    HCT 36.1 (L) 07/22/2021    MCV 93.5 07/22/2021    MCH 32.6 (H) 07/22/2021    MCHC 34.9 07/22/2021    RDW 14.9 07/22/2021     07/22/2021    MPV 10.3 07/22/2021    BANDSPCT 4 (L) 09/17/2015    SEGSPCT 56.0 07/22/2021    EOSRELPCT 5.4 (H) 07/22/2021    BASOPCT 2.0 (H) 07/22/2021    LYMPHOPCT 19.5 (L) 07/22/2021    MONOPCT 17.1 (H) 07/22/2021    BANDABS 0.17 09/17/2015    SEGSABS 1.2 07/22/2021    EOSABS 0.1 07/22/2021    BASOSABS 0.0 07/22/2021    LYMPHSABS 0.4 07/22/2021    MONOSABS 0.4 07/22/2021    DIFFTYPE AUTOMATED DIFFERENTIAL 07/22/2021    [facility] RARE 09/17/2015     Lab Results   Component Value Date    ESR 5 07/22/2021     Chemistry:  Lab Results   Component Value Date     07/22/2021    K 3.9 07/22/2021    CL 98 (L) 07/22/2021    CO2 29 07/22/2021    BUN 13 07/22/2021    CREATININE 1.0 07/22/2021    GLUCOSE 87 07/22/2021    CALCIUM 8.6 07/22/2021    PROT 6.3 (L) 07/22/2021    PROT 6.3 (L) 07/22/2021    LABALBU 4.4 07/22/2021    BILITOT 0.5 07/22/2021    ALKPHOS 59 07/22/2021    AST 19 07/22/2021    ALT 21 07/22/2021    LABGLOM 55 (L) 07/22/2021    GFRAA >60 07/22/2021    AGRATIO 0.3 (L) 09/09/2015    GLOB 10.1 09/09/2015    PHOS 3.8 10/09/2015    MG 2.0 10/09/2015     Lab Results   Component Value Date     07/22/2021     No components found for: LD  Lab Results   Component Value Date    TSHHS 2.120 07/05/2018     Immunology:  Lab Results   Component Value Date    PROT 6.3 (L) 07/22/2021    PROT 6.3 (L) 07/22/2021    SPEP  07/22/2021     INTERPRETATION - Monoclonal gammopathy, 200 mg/dL, identified as IgG kappa on concurrent BRENT, which has decreased from prior measurement of 300 mg/dL on 4/22/2021. Trenton Beltran MD    SPEP  07/22/2021     INTERPRETATION - Monoclonal gammpathy, faint IgG kappa. Trenton Beltran MD    ALBUMINELP 3.6 07/22/2021    LABALPH 0.2 07/22/2021    LABALPH 0.6 07/22/2021    LABBETA 0.8 07/22/2021    GAMGLOB 1.0 07/22/2021     Lab Results   Component Value Date    KAPPAUVOL 31.20 (H) 07/22/2021    LAMBDAUVOL 24.98 10/23/2020    KLFLCR 1.65 07/22/2021     Lab Results   Component Value Date    B2M 1.9 07/22/2021     Coagulation Panel:  No results found for: PROTIME, INR, APTT, DDIMER  Anemia Panel:  No results found for: [id number], FOLATE  Tumor Markers:  No results found for: , CEA, , LABCA2, PSA  Observations:  PHQ-9 Total Score: 0 (7/29/2021 10:14 AM)        Assessment & Plan:   1.          Symptomatic IgG Kappa multiple basis. Recent imaging and labs were reviewed and discussed with the patient.

## 2021-07-29 NOTE — PROGRESS NOTES
MA Rooming Questions  Patient: Praful Cotto  MRN: [id number]    Date: 7/29/2021        1. Do you have any new issues?   no         2. Do you need any refills on medications?    no    3. Have you had any imaging done since your last visit?   no    4. Have you been hospitalized or seen in the emergency room since your last visit here?   no    5. Did the patient have a depression screening completed today?  Yes    PHQ-9 Total Score: 0 (7/29/2021 10:14 AM)       PHQ-9 Given to (if applicable):               PHQ-9 Score (if applicable):                     [] Positive     [x]  Negative              Does question #9 need addressed (if applicable)                     [] Yes    []  No               Mel Plascencia CMA

## 2021-08-05 ENCOUNTER — OFFICE VISIT (OUTPATIENT)
Dept: INTERNAL MEDICINE CLINIC | Age: 66
End: 2021-08-05
Payer: MEDICARE

## 2021-08-05 VITALS
OXYGEN SATURATION: 97 % | DIASTOLIC BLOOD PRESSURE: 80 MMHG | WEIGHT: 162 LBS | HEART RATE: 60 BPM | RESPIRATION RATE: 18 BRPM | SYSTOLIC BLOOD PRESSURE: 134 MMHG | BODY MASS INDEX: 27.81 KG/M2

## 2021-08-05 DIAGNOSIS — I10 ESSENTIAL HYPERTENSION: ICD-10-CM

## 2021-08-05 DIAGNOSIS — G62.0 DRUG-INDUCED POLYNEUROPATHY (HCC): Primary | ICD-10-CM

## 2021-08-05 DIAGNOSIS — E78.2 MIXED HYPERLIPIDEMIA: ICD-10-CM

## 2021-08-05 DIAGNOSIS — C90.00 MULTIPLE MYELOMA NOT HAVING ACHIEVED REMISSION (HCC): ICD-10-CM

## 2021-08-05 PROCEDURE — 99214 OFFICE O/P EST MOD 30 MIN: CPT | Performed by: INTERNAL MEDICINE

## 2021-08-05 RX ORDER — DULOXETIN HYDROCHLORIDE 60 MG/1
CAPSULE, DELAYED RELEASE ORAL
Qty: 30 CAPSULE | Refills: 11
Start: 2021-08-05 | End: 2022-03-04 | Stop reason: SDUPTHER

## 2021-08-05 NOTE — PROGRESS NOTES
Viviantami Done Zach  1955 08/05/21    SUBJECTIVE:      Pt was seen at [facility], continues on revlimid for the myeloma; will need t f/u in one year. She continues to follow with Dr Terra Bond. 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 The patient is taking hypertensive medications compliantly without side effects. Denies chest pain, dyspnea, edema, or TIA's. Cymbalta causes her to feel somewhat numb emotionally. She has neuropathy but no pain with this. OBJECTIVE:    /80   Pulse 60   Resp 18   Wt 162 lb (73.5 kg)   SpO2 97%   BMI 27.81 kg/m²     Physical Exam  Constitutional:       Appearance: She is well-developed. Eyes:      General: No scleral icterus. Conjunctiva/sclera: Conjunctivae normal.   Neck:      Thyroid: No thyromegaly. Trachea: No tracheal deviation. Cardiovascular:      Rate and Rhythm: Normal rate and regular rhythm. Heart sounds: No murmur heard. No friction rub. No gallop. Pulmonary:      Effort: No respiratory distress. Breath sounds: No wheezing or rales. Abdominal:      General: Bowel sounds are normal. There is no distension. Palpations: Abdomen is soft. There is no hepatomegaly or mass. Tenderness: There is no abdominal tenderness. There is no guarding or rebound. Musculoskeletal:      Cervical back: Neck supple. Lymphadenopathy:      Cervical: No cervical adenopathy. Skin:     Nails: There is no clubbing. Neurological:      Mental Status: She is alert and oriented to person, place, and time. Psychiatric:         Behavior: Behavior normal.         Judgment: Judgment normal.         ASSESSMENT:    1. Drug-induced polyneuropathy ([address]    2. Essential hypertension    3. Mixed hyperlipidemia    4. Multiple myeloma not having achieved remission ([address]        PLAN:    Smiley Bass was seen today for other.     Diagnoses and all orders for this visit:    Drug-induced polyneuropathy ([facility] - will decrease cymbalta to 60mg daily - had not had effects on neuropathy    Essential hypertension - at goal, no change     Mixed hyperlipidemia - defer labs - were very good 18 months ago    Multiple myeloma not having achieved remission ([facility] - doing very well with revlimid, no change    Other orders  -     DULoxetine (CYMBALTA) 60 MG extended release capsule; TAKE ONE CAPSULE BY MOUTH ONCE A DAY

## 2021-08-09 DIAGNOSIS — C90.00 MULTIPLE MYELOMA, REMISSION STATUS UNSPECIFIED (HCC): Primary | ICD-10-CM

## 2021-08-09 RX ORDER — LENALIDOMIDE 10 MG/1
10 CAPSULE ORAL DAILY
Qty: 28 CAPSULE | Refills: 0 | Status: SHIPPED | OUTPATIENT
Start: 2021-08-09 | End: 2021-09-14 | Stop reason: SDUPTHER

## 2021-08-21 DIAGNOSIS — I10 ESSENTIAL HYPERTENSION: ICD-10-CM

## 2021-08-23 RX ORDER — ATENOLOL 50 MG/1
TABLET ORAL
Qty: 90 TABLET | Refills: 0 | Status: SHIPPED | OUTPATIENT
Start: 2021-08-23 | End: 2021-11-22

## 2021-09-14 DIAGNOSIS — C90.00 MULTIPLE MYELOMA, REMISSION STATUS UNSPECIFIED (HCC): Primary | ICD-10-CM

## 2021-09-14 RX ORDER — LENALIDOMIDE 10 MG/1
10 CAPSULE ORAL DAILY
Qty: 28 CAPSULE | Refills: 0 | Status: SHIPPED | OUTPATIENT
Start: 2021-09-14 | End: 2021-10-12 | Stop reason: SDUPTHER

## 2021-10-12 DIAGNOSIS — C90.00 MULTIPLE MYELOMA, REMISSION STATUS UNSPECIFIED (HCC): Primary | ICD-10-CM

## 2021-10-12 RX ORDER — LENALIDOMIDE 10 MG/1
10 CAPSULE ORAL DAILY
Qty: 28 CAPSULE | Refills: 0 | Status: SHIPPED | OUTPATIENT
Start: 2021-10-12 | End: 2021-11-08

## 2021-10-15 ENCOUNTER — HOSPITAL ENCOUNTER (OUTPATIENT)
Dept: INFUSION THERAPY | Age: 66
Discharge: HOME | End: 2021-10-15
Payer: MEDICARE

## 2021-10-15 DIAGNOSIS — C90.00 MULTIPLE MYELOMA, REMISSION STATUS UNSPECIFIED (HCC): ICD-10-CM

## 2021-10-15 LAB
ALBUMIN SERPL-MCNC: 4.1 GM/DL (ref 3.4–5)
ALP BLD-CCNC: 51 IU/L (ref 40–129)
ALT SERPL-CCNC: 23 U/L (ref 10–40)
ANION GAP SERPL CALCULATED.3IONS-SCNC: 8 MMOL/L (ref 4–16)
AST SERPL-CCNC: 21 IU/L (ref 15–37)
BASOPHILS ABSOLUTE: 0 K/CU MM
BASOPHILS RELATIVE PERCENT: 0.5 % (ref 0–1)
BILIRUB SERPL-MCNC: 0.5 MG/DL (ref 0–1)
BUN BLDV-MCNC: 12 MG/DL (ref 6–23)
CALCIUM SERPL-MCNC: 8.6 MG/DL (ref 8.3–10.6)
CHLORIDE BLD-SCNC: 101 MMOL/L (ref 99–110)
CO2: 24 MMOL/L (ref 21–32)
CREAT SERPL-MCNC: 0.8 MG/DL (ref 0.6–1.1)
DIFFERENTIAL TYPE: ABNORMAL
EOSINOPHILS ABSOLUTE: 0.1 K/CU MM
EOSINOPHILS RELATIVE PERCENT: 4.8 % (ref 0–3)
ERYTHROCYTE SEDIMENTATION RATE: 3 MM/HR (ref 0–30)
GFR AFRICAN AMERICAN: >60 ML/MIN/1.73M2
GFR NON-AFRICAN AMERICAN: >60 ML/MIN/1.73M2
GLUCOSE BLD-MCNC: 102 MG/DL (ref 70–99)
HCT VFR BLD CALC: 36.3 % (ref 37–47)
HEMOGLOBIN: 12.3 GM/DL (ref 12.5–16)
IGA: 177 MG/DL (ref 69–382)
IGG,SERUM: 951 MG/DL (ref 723–1685)
IGM,SERUM: 34 MG/DL (ref 62–277)
LACTATE DEHYDROGENASE: 149 IU/L (ref 120–246)
LYMPHOCYTES ABSOLUTE: 0.3 K/CU MM
LYMPHOCYTES RELATIVE PERCENT: 16.2 % (ref 24–44)
MCH RBC QN AUTO: 32.2 PG (ref 27–31)
MCHC RBC AUTO-ENTMCNC: 33.9 % (ref 32–36)
MCV RBC AUTO: 95 FL (ref 78–100)
MONOCYTES ABSOLUTE: 0.3 K/CU MM
MONOCYTES RELATIVE PERCENT: 13.8 % (ref 0–4)
PDW BLD-RTO: 14.9 % (ref 11.7–14.9)
PLATELET # BLD: 151 K/CU MM (ref 140–440)
PMV BLD AUTO: 11.6 FL (ref 7.5–11.1)
POTASSIUM SERPL-SCNC: 3.8 MMOL/L (ref 3.5–5.1)
RBC # BLD: 3.82 M/CU MM (ref 4.2–5.4)
SEGMENTED NEUTROPHILS ABSOLUTE COUNT: 1.4 K/CU MM
SEGMENTED NEUTROPHILS RELATIVE PERCENT: 64.7 % (ref 36–66)
SODIUM BLD-SCNC: 133 MMOL/L (ref 135–145)
TOTAL PROTEIN: 6.1 GM/DL (ref 6.4–8.2)
WBC # BLD: 2.1 K/CU MM (ref 4–10.5)

## 2021-10-15 PROCEDURE — 82232 ASSAY OF BETA-2 PROTEIN: CPT

## 2021-10-15 PROCEDURE — 36415 COLL VENOUS BLD VENIPUNCTURE: CPT

## 2021-10-15 PROCEDURE — 85652 RBC SED RATE AUTOMATED: CPT

## 2021-10-15 PROCEDURE — 86320 SERUM IMMUNOELECTROPHORESIS: CPT

## 2021-10-15 PROCEDURE — 84165 PROTEIN E-PHORESIS SERUM: CPT

## 2021-10-15 PROCEDURE — 82784 ASSAY IGA/IGD/IGG/IGM EACH: CPT

## 2021-10-15 PROCEDURE — 83883 ASSAY NEPHELOMETRY NOT SPEC: CPT

## 2021-10-15 PROCEDURE — 85025 COMPLETE CBC W/AUTO DIFF WBC: CPT

## 2021-10-15 PROCEDURE — 80053 COMPREHEN METABOLIC PANEL: CPT

## 2021-10-15 PROCEDURE — 83615 LACTATE (LD) (LDH) ENZYME: CPT

## 2021-10-17 LAB
BETA-2 MICROGLOBULIN: 2 MG/L (ref 1.1–2.4)
KAPPA QUANT FREE LIGHT CHAINS: 35.15 MG/L (ref 3.3–19.4)
KAPPA/LAMBDA FREE LIGHT CHAIN RATIO: 1.59 (ref 0.26–1.65)
LAMBDA FREE LIGHT CHAINS QNT: 22.05 MG/L (ref 5.71–26.3)

## 2021-10-18 LAB
ALBUMIN ELP: 3.4 GM/DL (ref 3.2–5.6)
ALPHA-1-GLOBULIN: 0.2 GM/DL (ref 0.1–0.4)
ALPHA-2-GLOBULIN: 0.6 GM/DL (ref 0.4–1.2)
BETA GLOBULIN: 0.8 GM/DL (ref 0.5–1.3)
GAMMA GLOBULIN: 1.1 GM/DL (ref 0.5–1.6)
SPEP INTERPRETATION: ABNORMAL
SPEP INTERPRETATION: NORMAL
TOTAL PROTEIN: 6.1 GM/DL (ref 6.4–8.2)

## 2021-10-21 NOTE — PROGRESS NOTES
Patient Name: Mathew Reyes  Patient : 1955  Patient MRN: [id number]     Primary Oncologist: Arvin Freeman MD  Referring Provider: Naseem Sherman MD     Date of Service: 10/22/2021      Chief Complaint:   Chief Complaint   Patient presents with   [address]     Patient Active Problem List:     Multiple myeloma     HPI:  Gonzalez Rasheed is a 78-year-old very pleasant female with medical history significant for hypertension, anxiety disorder, and anemia since 2015, initially referred to me for evaluation for her normocytic normochromic anemia. She stated that she has been having right lateral chest wall pain starting May 2015. She had chest x-ray done in 2015 and it showed left sixth rib fracture. Her pain has been controlled with ibuprofen since then. On 2015, she was found to have anemia (hemoglobin 9.3) and she was started with oral iron supplementation. Her anemia got worse on repeat blood test done on 2015, (hemoglobin 8.1). She had chest x-ray and ultrasound of the abdomen on 2015, and they were normal.      She had a colonoscopy by Dr. Jackie Salazar about three to four years ago which was normal according to her. She also had history of melanoma twice in the past (left lower extremity melanoma which was excised in  and right shoulder melanoma which was excised in ). She was also found to have elevated total protein by primary care physician. Because of her normocytic normochromic anemia associated with elevated total protein, she was subsequently referred to me for evaluation of possible plasma cell dyscrasia. She denies fever, night sweats, loss of appetite, and weight loss. She does not have any other significant symptoms except right lower chest wall pain.       Laboratory workups done on 2015, showed persistent normocytic normochromic anemia (hemoglobin 8.2 and MCV 89), persisted elevated total protein (total protein 14.1), normal calcium (calcium 9.9), slight elevation in serum creatinine (creatinine 1.2), elevated Beta-2 microglobulin (6.5), and elevated ESR (more than 120). Serum protein electrophoresis and immunofixation was sent on September 14, 2015, and it showed monoclonal IgG Kappa protein of 6.8 gram/dL. Serum Kappa light chain was 16.9 and serum Lambda light chain was less than 0.16. I did bone marrow biopsy on September 19, 2015, and the bone marrow aspirate flow cytometry showed 38 percent plasma cell population consistent with plasma cell neoplasm. Bone marrow biopsy  stain was about 98 percent positive for  positive plasma cell. 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 Gain of 1q has been associated with advanced disease and it is considered a high risk indicator. Cytogenetic studies showed normal female karyotype (46XX). Ms. Danika Hudson had a PET-CT scan on October 1, 2015, and it showed no metabolically active lytic lesions. Focal skin thickening in the mid medial right cuff with associated metabolic activity. This is non-specific and can be seen with infectious or inflammatory process; however, given the history of melanoma, recommend correlation with direct visualization. Ms. Danika Hudson was started with chemotherapy (bortezomib, cyclophosphamide, and dexamethasone) on October 2, 2015. Ms. Danika Hudson was seen by Dr. Nneka Langston at [facility] for possible autologous stem cell transplantation. Dr. Nneka Langston and Ms. Serrano decided to have stem cell collection for future use. Stem cell was collected on the first week of March 2016. Our plan was to continue with current chemotherapy followed by maintenance chemotherapy.  Dr. Nneka Langston will consider autologous stem cell transplantation when her disease becomes refractory or progressed. Ms. Irina Srivastava completed her eight cycles of chemotherapy with cyclophosphamide, bortezomib, and dexamethasone on July 8, 2016. Maintenance chemotherapy with Velcade and dexamethasone was started on August 2, 2016 and she completed it on June 12, 2018. I recognized that her monoclonal protein went up to 1100 mg/dL on recent blood test done on July 18, 2018. She was subsequently evaluated by Dr. Adela Hensely and she believes it is due to stopping velcade. She recommend to resume maintenance chemotherapy. She prefers Nilaro maintenance over velcade because of ease of administration. Maintenance chemotherapy with Patel Senters was started on August 20, 2018 and we stopped it on November 27, 2018 since she has biochemical progression of the disease. We decided to change it to maintenance chemotherapy with Revlimid. Revlimid 10 mg daily was started on December 6, 2018. On October 22, 2021, she presented to me for followup. I have been following Ms. Serrano for IgG Kappa multiple myeloma and she is currently on maintenance chemotherapy with Revlimid (10 mg daily) since December 6, 2018. She is tolerating revlimid very well and she doesn't encounter any major side effects from it. I recognize that her monoclonal protein was too small to measure on 10/15/21. It was 200 mg/dl on 7/22/21, 300 mg/dl on 4/22/21, 200mg/dl on 1/22/21, 200 mg/dl on October 23, 2020, very small to measure on [facility] on 7/20/20, 200 mg/dl on 4/22/2020, 200 mg/dl on 1/21/20, 430 mg/dl on 10/23/19, 200 mg on 7/23/19,  400mg on 4/23/19, 1000mg on  1/22/19 and 2100 mg/dL on blood test done on November 20, 2018. I believe she is responding very well to Revlimid and I recommend her to continue with that for now. I will see her again in three months and I will repeat all the blood tests again a week before next appointment.      She has mild neuropathy in her right upper extremity, but it is less likely to be related to revlimid. Will continue to monitor it for now. Osteoporosis - her primary care physician started fosamax. I recommend her to continue with that for now. Chemo induced neutropenia -  Stable and will continue to monitor it for now. COVID vaccine - She already received her COVID19 vaccine. VTE prophylaxis - I recommend her to continue with aspirin 81 mg daily. Hypertension - She is on atenolol. SBP is normal today. I recommend salt restriction. Chemo induced neuropathy - stable. To continue with cymbalta 60 mg daily. Health maintenance - I recommend her to have age-appropriate cancer screening, exercise, low-fat and low-sodium diet. Besides that, she does not have any other significant symptoms on  today's visit. PAST MEDICAL HISTORY:  Significant for:  1. Hypertension. 2.         Melanoma. 3.         Anxiety disorder. PAST SURGICAL HISTORY:  Significant for:  1. Total abdominal hysterectomy in 1999.  2.         Left lower extremity excision and biopsy for melanoma in 2006. 3.         Excision and biopsy of right shoulder melanoma in 2010. FAMILY HISTORY:  Significant for breast cancer in her mother. No other family history of cancer disease. SOCIAL HISTORY:  She denies smoking and illicit drug abuse. She socially drinks alcohol. She is  and she is currently living in [facility]. ALLERGIES:  No known drug allergies. Oncology History    No history exists. Review of Systems: \"Per interval history; otherwise 10 point ROS is negative. \"  Her energy level is stable, appetite, and sleep are pretty good. She denies fever, chills, night sweats, cough, shortness of breath, chest pain, hemoptysis, or palpitations. Her bowel and bladder functions are normal. She denies nausea, vomiting, abdominal pain, diarrhea, constipation, dysuria, loss of appetite, or weight loss. She has mild stable neuropathy in her right upper extremity.  She doesn't have bleeding or clotting issues. She doesn't have any pain in her body. She has mild anxiety. No depression. The rest of the systems are unremarkable. Vital Signs:  BP (!) 154/76 (Site: Right Upper Arm, Position: Sitting, Cuff Size: Medium Adult)   Pulse 55   Temp 97.3 °F (36.3 °C) (Infrared)   Resp 16   Ht 5' 4.5\" (1.638 m)   Wt 160 lb (72.6 kg)   SpO2 98%   BMI 27.04 kg/m²     Physical Exam:  CONSTITUTIONAL: awake, alert, cooperative, no apparent distress   EYES: pupils equal, round and reactive to light, sclera clear and conjunctiva normal  ENT: Normocephalic, without obvious abnormality, atraumatic  NECK: supple, symmetrical, no jugular venous distension and no carotid bruits   HEMATOLOGIC/LYMPHATIC: no cervical, supraclavicular or axillary lymphadenopathy   LUNGS: VBS, no wheezes, no increased work of breathing, clear to auscultation, no crackles, no rhonchi,    CARDIOVASCULAR: regular rate and rhythm, normal S1 and S2, no murmur noted  ABDOMEN: soft, non-distended, non-tender, normal bowel sounds x 4, no masses palpated, no hepatosplenomegaly   MUSCULOSKELETAL: full range of motion noted, tone is normal  NEUROLOGIC: awake, alert, oriented to name, place and time. Motor skills grossly intact. SKIN: Normal skin color, texture, turgor and no jaundice.  appears intact   EXTREMITIES: no clubbing, no LE edema, no cyanosis, no leg swelling,      Labs:  Hematology:  Lab Results   Component Value Date    WBC 2.1 (L) 10/15/2021    RBC 3.82 (L) 10/15/2021    HGB 12.3 (L) 10/15/2021    HCT 36.3 (L) 10/15/2021    MCV 95.0 10/15/2021    MCH 32.2 (H) 10/15/2021    MCHC 33.9 10/15/2021    RDW 14.9 10/15/2021     10/15/2021    MPV 11.6 (H) 10/15/2021    BANDSPCT 4 (L) 09/17/2015    SEGSPCT 64.7 10/15/2021    EOSRELPCT 4.8 (H) 10/15/2021    BASOPCT 0.5 10/15/2021    LYMPHOPCT 16.2 (L) 10/15/2021    MONOPCT 13.8 (H) 10/15/2021    BANDABS 0.17 09/17/2015    SEGSABS 1.4 10/15/2021    EOSABS 0.1 10/15/2021    BASOSABS 0.0 10/15/2021    LYMPHSABS 0.3 10/15/2021    MONOSABS 0.3 10/15/2021    DIFFTYPE AUTOMATED DIFFERENTIAL 10/15/2021    WBCMORP RARE 09/17/2015     Lab Results   Component Value Date    ESR 3 10/15/2021     Chemistry:  Lab Results   Component Value Date     (L) 10/15/2021    K 3.8 10/15/2021     10/15/2021    CO2 24 10/15/2021    BUN 12 10/15/2021    CREATININE 0.8 10/15/2021    GLUCOSE 102 (H) 10/15/2021    CALCIUM 8.6 10/15/2021    PROT 6.1 (L) 10/15/2021    PROT 6.1 (L) 10/15/2021    LABALBU 4.1 10/15/2021    BILITOT 0.5 10/15/2021    ALKPHOS 51 10/15/2021    AST 21 10/15/2021    ALT 23 10/15/2021    LABGLOM >60 10/15/2021    GFRAA >60 10/15/2021    AGRATIO 0.3 (L) 09/09/2015    GLOB 10.1 09/09/2015    PHOS 3.8 10/09/2015    MG 2.0 10/09/2015     Lab Results   Component Value Date     10/15/2021     No components found for: LD  Lab Results   Component Value Date    TSHHS 2.120 07/05/2018     Immunology:  Lab Results   Component Value Date    PROT 6.1 (L) 10/15/2021    PROT 6.1 (L) 10/15/2021    SPEP  10/15/2021     INTERPRETATION - Very faint possible monoclonal gammopathy IgG kappa, too small to measure, previously 200 mg/dl since 7/24/19. See BRENT. RS    SPEP  10/15/2021     INTERPRETATION - Monoclonal gammopathy, IgG kappa. RS    ALBUMINELP 3.4 10/15/2021    LABALPH 0.2 10/15/2021    LABALPH 0.6 10/15/2021    LABBETA 0.8 10/15/2021    GAMGLOB 1.1 10/15/2021     Lab Results   Component Value Date    KAPPAUVOL 35.15 (H) 10/15/2021    LAMBDAUVOL 24.98 10/23/2020    KLFLCR 1.59 10/15/2021     Lab Results   Component Value Date    B2M 2.0 10/15/2021     Coagulation Panel:  No results found for: PROTIME, INR, APTT, DDIMER  Anemia Panel:  No results found for: CIFKVYHC53, FOLATE  Tumor Markers:  No results found for: , CEA, , LABCA2, PSA  Observations:  PHQ-9 Total Score: 0 (10/22/2021 10:59 AM)        Assessment & Plan:   1.          Symptomatic IgG Kappa multiple myeloma. 2.         Normocytic normochromic anemia - due to multiple myeloma. PLAN:  Ms. Jen Garza has been followed for IgG Kappa multiple myeloma. On October 22, 2021, she presented to me for followup. I have been following Ms. Serrano for IgG Kappa multiple myeloma and she is currently on maintenance chemotherapy with Revlimid (10 mg daily) since December 6, 2018. She is tolerating revlimid very well and she doesn't encounter any major side effects from it. I recognize that her monoclonal protein was too small to measure on 10/15/21. It was 200 mg/dl on 7/22/21, 300 mg/dl on 4/22/21, 200mg/dl on 1/22/21, 200 mg/dl on October 23, 2020, very small to measure on [facility] on 7/20/20, 200 mg/dl on 4/22/2020, 200 mg/dl on 1/21/20, 430 mg/dl on 10/23/19, 200 mg on 7/23/19,  400mg on 4/23/19, 1000mg on  1/22/19 and 2100 mg/dL on blood test done on November 20, 2018. I believe she is responding very well to Revlimid and I recommend her to continue with that for now. I will see her again in three months and I will repeat all the blood tests again a week before next appointment. She has mild neuropathy in her right upper extremity, but it is less likely to be related to revlimid. Will continue to monitor it for now. Osteoporosis - her primary care physician started fosamax. I recommend her to continue with that for now. Chemo induced neutropenia -  Stable and will continue to monitor it for now. COVID vaccine - She already received her COVID19 vaccine. VTE prophylaxis - I recommend her to continue with aspirin 81 mg daily. Hypertension - She is on atenolol. SBP is normal today. I recommend salt restriction. Chemo induced neuropathy - stable. To continue with cymbalta 60 mg daily. Health maintenance - I recommend her to have age-appropriate cancer screening, exercise, low-fat and low-sodium diet. I answered all her questions and concerns for today.  I asked her to follow up with her primary care physician on regular basis. Recent imaging and labs were reviewed and discussed with the patient.

## 2021-10-22 ENCOUNTER — HOSPITAL ENCOUNTER (OUTPATIENT)
Dept: INFUSION THERAPY | Age: 66
Discharge: HOME | End: 2021-10-22
Payer: MEDICARE

## 2021-10-22 ENCOUNTER — OFFICE VISIT (OUTPATIENT)
Dept: ONCOLOGY | Age: 66
End: 2021-10-22
Payer: MEDICARE

## 2021-10-22 VITALS
DIASTOLIC BLOOD PRESSURE: 76 MMHG | HEART RATE: 55 BPM | RESPIRATION RATE: 16 BRPM | BODY MASS INDEX: 26.66 KG/M2 | SYSTOLIC BLOOD PRESSURE: 154 MMHG | WEIGHT: 160 LBS | OXYGEN SATURATION: 98 % | TEMPERATURE: 97.3 F | HEIGHT: 65 IN

## 2021-10-22 DIAGNOSIS — C90.00 MULTIPLE MYELOMA, REMISSION STATUS UNSPECIFIED (HCC): Primary | ICD-10-CM

## 2021-10-22 PROCEDURE — 99211 OFF/OP EST MAY X REQ PHY/QHP: CPT

## 2021-10-22 PROCEDURE — 99214 OFFICE O/P EST MOD 30 MIN: CPT | Performed by: INTERNAL MEDICINE

## 2021-10-22 ASSESSMENT — PATIENT HEALTH QUESTIONNAIRE - PHQ9
1. LITTLE INTEREST OR PLEASURE IN DOING THINGS: 0
SUM OF ALL RESPONSES TO PHQ QUESTIONS 1-9: 0
SUM OF ALL RESPONSES TO PHQ QUESTIONS 1-9: 0
SUM OF ALL RESPONSES TO PHQ9 QUESTIONS 1 & 2: 0
SUM OF ALL RESPONSES TO PHQ QUESTIONS 1-9: 0
2. FEELING DOWN, DEPRESSED OR HOPELESS: 0

## 2021-10-22 NOTE — PROGRESS NOTES
MA Rooming Questions  Patient: Mathew Reyes  MRN: [id number]    Date: 10/22/2021        1. Do you have any new issues?   no         2. Do you need any refills on medications?    no    3. Have you had any imaging done since your last visit?   no    4. Have you been hospitalized or seen in the emergency room since your last visit here?   no    5. Did the patient have a depression screening completed today?  Yes    PHQ-9 Total Score: 0 (10/22/2021 10:59 AM)       PHQ-9 Given to (if applicable):               PHQ-9 Score (if applicable):                     [] Positive     []  Negative              Does question #9 need addressed (if applicable)                     [] Yes    []  No               Sherin Fenton CMA

## 2021-11-08 DIAGNOSIS — C90.00 MULTIPLE MYELOMA, REMISSION STATUS UNSPECIFIED (HCC): ICD-10-CM

## 2021-11-08 RX ORDER — LENALIDOMIDE 10 MG/1
10 CAPSULE ORAL DAILY
Qty: 28 CAPSULE | Refills: 0 | Status: SHIPPED | OUTPATIENT
Start: 2021-11-08 | End: 2021-11-12 | Stop reason: SDUPTHER

## 2021-11-12 DIAGNOSIS — C90.00 MULTIPLE MYELOMA, REMISSION STATUS UNSPECIFIED (HCC): ICD-10-CM

## 2021-11-12 RX ORDER — LENALIDOMIDE 10 MG/1
10 CAPSULE ORAL DAILY
Qty: 28 CAPSULE | Refills: 0 | Status: SHIPPED | OUTPATIENT
Start: 2021-11-12 | End: 2021-12-08 | Stop reason: SDUPTHER

## 2021-11-21 DIAGNOSIS — I10 ESSENTIAL HYPERTENSION: ICD-10-CM

## 2021-11-22 RX ORDER — ATENOLOL 50 MG/1
TABLET ORAL
Qty: 90 TABLET | Refills: 0 | Status: SHIPPED | OUTPATIENT
Start: 2021-11-22 | End: 2022-02-18

## 2021-12-08 DIAGNOSIS — C90.00 MULTIPLE MYELOMA, REMISSION STATUS UNSPECIFIED (HCC): ICD-10-CM

## 2021-12-08 RX ORDER — LENALIDOMIDE 10 MG/1
10 CAPSULE ORAL DAILY
Qty: 28 CAPSULE | Refills: 0 | Status: SHIPPED | OUTPATIENT
Start: 2021-12-08 | End: 2022-01-10 | Stop reason: SDUPTHER

## 2021-12-22 DIAGNOSIS — M81.0 OSTEOPOROSIS, UNSPECIFIED OSTEOPOROSIS TYPE, UNSPECIFIED PATHOLOGICAL FRACTURE PRESENCE: ICD-10-CM

## 2021-12-22 RX ORDER — ALENDRONATE SODIUM 70 MG/1
TABLET ORAL
Qty: 4 TABLET | Refills: 11 | Status: SHIPPED | OUTPATIENT
Start: 2021-12-22 | End: 2022-07-25 | Stop reason: SDUPTHER

## 2022-01-10 ENCOUNTER — CLINICAL DOCUMENTATION (OUTPATIENT)
Dept: ONCOLOGY | Age: 67
End: 2022-01-10

## 2022-01-10 DIAGNOSIS — C90.00 MULTIPLE MYELOMA, REMISSION STATUS UNSPECIFIED (HCC): Primary | ICD-10-CM

## 2022-01-10 RX ORDER — LENALIDOMIDE 10 MG/1
10 CAPSULE ORAL DAILY
Qty: 28 CAPSULE | Refills: 0 | Status: SHIPPED | OUTPATIENT
Start: 2022-01-10 | End: 2022-02-08 | Stop reason: SDUPTHER

## 2022-01-10 NOTE — PROGRESS NOTES
Revlimid 10 mg refilled with authorization # [id number]. Rx sent to [facility] pharmacy.

## 2022-01-18 ENCOUNTER — HOSPITAL ENCOUNTER (OUTPATIENT)
Dept: INFUSION THERAPY | Age: 67
Discharge: HOME | End: 2022-01-18
Payer: MEDICARE

## 2022-01-18 DIAGNOSIS — C90.00 MULTIPLE MYELOMA, REMISSION STATUS UNSPECIFIED (HCC): ICD-10-CM

## 2022-01-18 LAB
ALBUMIN SERPL-MCNC: 4.2 GM/DL (ref 3.4–5)
ALP BLD-CCNC: 58 IU/L (ref 40–128)
ALT SERPL-CCNC: 23 U/L (ref 10–40)
ANION GAP SERPL CALCULATED.3IONS-SCNC: 10 MMOL/L (ref 4–16)
AST SERPL-CCNC: 24 IU/L (ref 15–37)
BASOPHILS ABSOLUTE: 0 K/CU MM
BASOPHILS RELATIVE PERCENT: 0.5 % (ref 0–1)
BILIRUB SERPL-MCNC: 0.6 MG/DL (ref 0–1)
BUN BLDV-MCNC: 13 MG/DL (ref 6–23)
CALCIUM SERPL-MCNC: 9 MG/DL (ref 8.3–10.6)
CHLORIDE BLD-SCNC: 99 MMOL/L (ref 99–110)
CO2: 27 MMOL/L (ref 21–32)
CREAT SERPL-MCNC: 1 MG/DL (ref 0.6–1.1)
DIFFERENTIAL TYPE: ABNORMAL
EOSINOPHILS ABSOLUTE: 0.1 K/CU MM
EOSINOPHILS RELATIVE PERCENT: 4.1 % (ref 0–3)
ERYTHROCYTE SEDIMENTATION RATE: 2 MM/HR (ref 0–30)
GFR AFRICAN AMERICAN: >60 ML/MIN/1.73M2
GFR NON-AFRICAN AMERICAN: 55 ML/MIN/1.73M2
GLUCOSE BLD-MCNC: 94 MG/DL (ref 70–99)
HCT VFR BLD CALC: 36.7 % (ref 37–47)
HEMOGLOBIN: 12.6 GM/DL (ref 12.5–16)
IGA: 208 MG/DL (ref 69–382)
IGG,SERUM: 1098 MG/DL (ref 723–1685)
IGM,SERUM: 39 MG/DL (ref 62–277)
LACTATE DEHYDROGENASE: 172 IU/L (ref 120–246)
LYMPHOCYTES ABSOLUTE: 0.5 K/CU MM
LYMPHOCYTES RELATIVE PERCENT: 23.3 % (ref 24–44)
MCH RBC QN AUTO: 31.7 PG (ref 27–31)
MCHC RBC AUTO-ENTMCNC: 34.3 % (ref 32–36)
MCV RBC AUTO: 92.4 FL (ref 78–100)
MONOCYTES ABSOLUTE: 0.3 K/CU MM
MONOCYTES RELATIVE PERCENT: 15.5 % (ref 0–4)
PDW BLD-RTO: 15 % (ref 11.7–14.9)
PLATELET # BLD: 164 K/CU MM (ref 140–440)
PMV BLD AUTO: 11.3 FL (ref 7.5–11.1)
POTASSIUM SERPL-SCNC: 3.9 MMOL/L (ref 3.5–5.1)
RBC # BLD: 3.97 M/CU MM (ref 4.2–5.4)
SEGMENTED NEUTROPHILS ABSOLUTE COUNT: 1.2 K/CU MM
SEGMENTED NEUTROPHILS RELATIVE PERCENT: 56.6 % (ref 36–66)
SODIUM BLD-SCNC: 136 MMOL/L (ref 135–145)
TOTAL PROTEIN: 6.5 GM/DL (ref 6.4–8.2)
WBC # BLD: 2.2 K/CU MM (ref 4–10.5)

## 2022-01-18 PROCEDURE — 36415 COLL VENOUS BLD VENIPUNCTURE: CPT

## 2022-01-18 PROCEDURE — 85025 COMPLETE CBC W/AUTO DIFF WBC: CPT

## 2022-01-18 PROCEDURE — 85652 RBC SED RATE AUTOMATED: CPT

## 2022-01-18 PROCEDURE — 83615 LACTATE (LD) (LDH) ENZYME: CPT

## 2022-01-18 PROCEDURE — 84165 PROTEIN E-PHORESIS SERUM: CPT

## 2022-01-18 PROCEDURE — 82784 ASSAY IGA/IGD/IGG/IGM EACH: CPT

## 2022-01-18 PROCEDURE — 82232 ASSAY OF BETA-2 PROTEIN: CPT

## 2022-01-18 PROCEDURE — 86320 SERUM IMMUNOELECTROPHORESIS: CPT

## 2022-01-18 PROCEDURE — 80053 COMPREHEN METABOLIC PANEL: CPT

## 2022-01-18 PROCEDURE — 83883 ASSAY NEPHELOMETRY NOT SPEC: CPT

## 2022-01-20 LAB
BETA-2 MICROGLOBULIN: 1.9 MG/L (ref 1.1–2.4)
KAPPA QUANT FREE LIGHT CHAINS: 25.46 MG/L (ref 3.3–19.4)
KAPPA/LAMBDA FREE LIGHT CHAIN RATIO: 1.3 (ref 0.26–1.65)
LAMBDA FREE LIGHT CHAINS QNT: 19.61 MG/L (ref 5.71–26.3)

## 2022-01-21 LAB
ALBUMIN ELP: 3.8 GM/DL (ref 3.2–5.6)
ALPHA-1-GLOBULIN: 0.2 GM/DL (ref 0.1–0.4)
ALPHA-2-GLOBULIN: 0.6 GM/DL (ref 0.4–1.2)
BETA GLOBULIN: 0.9 GM/DL (ref 0.5–1.3)
GAMMA GLOBULIN: 1.1 GM/DL (ref 0.5–1.6)
SPEP INTERPRETATION: NORMAL
SPEP INTERPRETATION: NORMAL
TOTAL PROTEIN: 6.5 GM/DL (ref 6.4–8.2)

## 2022-01-24 ENCOUNTER — OFFICE VISIT (OUTPATIENT)
Dept: ONCOLOGY | Age: 67
End: 2022-01-24
Payer: MEDICARE

## 2022-01-24 ENCOUNTER — HOSPITAL ENCOUNTER (OUTPATIENT)
Dept: INFUSION THERAPY | Age: 67
Discharge: HOME | End: 2022-01-24

## 2022-01-24 VITALS
HEART RATE: 59 BPM | BODY MASS INDEX: 27.83 KG/M2 | OXYGEN SATURATION: 96 % | HEIGHT: 64 IN | WEIGHT: 163 LBS | TEMPERATURE: 96.5 F | DIASTOLIC BLOOD PRESSURE: 65 MMHG | SYSTOLIC BLOOD PRESSURE: 147 MMHG

## 2022-01-24 DIAGNOSIS — D61.82: ICD-10-CM

## 2022-01-24 DIAGNOSIS — C90.00 MULTIPLE MYELOMA, REMISSION STATUS UNSPECIFIED (HCC): Primary | ICD-10-CM

## 2022-01-24 PROCEDURE — 99214 OFFICE O/P EST MOD 30 MIN: CPT | Performed by: INTERNAL MEDICINE

## 2022-01-24 RX ORDER — NIACINAMIDE 500 MG
500 TABLET ORAL 2 TIMES DAILY WITH MEALS
COMMUNITY

## 2022-01-24 ASSESSMENT — PATIENT HEALTH QUESTIONNAIRE - PHQ9
SUM OF ALL RESPONSES TO PHQ QUESTIONS 1-9: 0
1. LITTLE INTEREST OR PLEASURE IN DOING THINGS: 0
2. FEELING DOWN, DEPRESSED OR HOPELESS: 0
SUM OF ALL RESPONSES TO PHQ QUESTIONS 1-9: 0
SUM OF ALL RESPONSES TO PHQ QUESTIONS 1-9: 0
SUM OF ALL RESPONSES TO PHQ9 QUESTIONS 1 & 2: 0
SUM OF ALL RESPONSES TO PHQ QUESTIONS 1-9: 0

## 2022-01-24 NOTE — PROGRESS NOTES
Patient Name: Bridgette Diggs  Patient : 1955  Patient MRN: [id number]     Primary Oncologist: Ifeoma Ordoñez MD  Referring Provider: Simba Sykes MD     Date of Service: 2022      Chief Complaint:   Chief Complaint   Patient presents with    Follow-up     Patient Active Problem List:     Multiple myeloma     HPI:  Evan Cranker is a 59-year-old very pleasant female with medical history significant for hypertension, anxiety disorder, and anemia since 2015, initially referred to me for evaluation for her normocytic normochromic anemia. She stated that she has been having right lateral chest wall pain starting May 2015. She had chest x-ray done in 2015 and it showed left sixth rib fracture. Her pain has been controlled with ibuprofen since then. On 2015, she was found to have anemia (hemoglobin 9.3) and she was started with oral iron supplementation. Her anemia got worse on repeat blood test done on 2015, (hemoglobin 8.1). She had chest x-ray and ultrasound of the abdomen on 2015, and they were normal.      She had a colonoscopy by Dr. Rad Parham about three to four years ago which was normal according to her. She also had history of melanoma twice in the past (left lower extremity melanoma which was excised in  and right shoulder melanoma which was excised in ). She was also found to have elevated total protein by primary care physician. Because of her normocytic normochromic anemia associated with elevated total protein, she was subsequently referred to me for evaluation of possible plasma cell dyscrasia. She denies fever, night sweats, loss of appetite, and weight loss. She does not have any other significant symptoms except right lower chest wall pain.       Laboratory workups done on 2015, showed persistent normocytic normochromic anemia (hemoglobin 8.2 and MCV 89), persisted elevated total protein (total protein 14.1), normal calcium (calcium 9.9), slight elevation in serum creatinine (creatinine 1.2), elevated Beta-2 microglobulin (6.5), and elevated ESR (more than 120). Serum protein electrophoresis and immunofixation was sent on September 14, 2015, and it showed monoclonal IgG Kappa protein of 6.8 gram/dL. Serum Kappa light chain was 16.9 and serum Lambda light chain was less than 0.16. I did bone marrow biopsy on September 19, 2015, and the bone marrow aspirate flow cytometry showed 38 percent plasma cell population consistent with plasma cell neoplasm. Bone marrow biopsy  stain was about 98 percent positive for  positive plasma cell. 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 Gain of 1q has been associated with advanced disease and it is considered a high risk indicator. Cytogenetic studies showed normal female karyotype (46XX). Ms. Crow Beasley had a PET-CT scan on October 1, 2015, and it showed no metabolically active lytic lesions. Focal skin thickening in the mid medial right cuff with associated metabolic activity. This is non-specific and can be seen with infectious or inflammatory process; however, given the history of melanoma, recommend correlation with direct visualization. Ms. Crow Beasley was started with chemotherapy (bortezomib, cyclophosphamide, and dexamethasone) on October 2, 2015. Ms. Crow Beasley was seen by Dr. Belkis Kuhn at [facility] for possible autologous stem cell transplantation. Dr. Belkis Kuhn and Ms. Serrano decided to have stem cell collection for future use. Stem cell was collected on the first week of March 2016. Our plan was to continue with current chemotherapy followed by maintenance chemotherapy.  Dr. Belkis Kuhn will consider autologous stem cell transplantation when her disease becomes refractory or progressed. Ms. Susanne Mercedes completed her eight cycles of chemotherapy with cyclophosphamide, bortezomib, and dexamethasone on July 8, 2016. Maintenance chemotherapy with Velcade and dexamethasone was started on August 2, 2016 and she completed it on June 12, 2018. I recognized that her monoclonal protein went up to 1100 mg/dL on recent blood test done on July 18, 2018. She was subsequently evaluated by Dr. Stefany Santos and she believes it is due to stopping velcade. She recommend to resume maintenance chemotherapy. She prefers Nilaro maintenance over velcade because of ease of administration. Maintenance chemotherapy with Kem Route was started on August 20, 2018 and we stopped it on November 27, 2018 since she has biochemical progression of the disease. We decided to change it to maintenance chemotherapy with Revlimid. Revlimid 10 mg daily was started on December 6, 2018. On January 24, 2022, she presented to me for followup. I have been following Ms. Serrano for IgG Kappa multiple myeloma and she is currently on maintenance chemotherapy with Revlimid (10 mg daily) since December 6, 2018. She is tolerating revlimid very well and she doesn't encounter any major side effects from it. I recognize that her monoclonal protein was faint IgG kappa only on 1/18/22. It was too small to measure on 10/15/21, 200 mg/dl on 7/22/21, 300 mg/dl on 4/22/21, 200mg/dl on 1/22/21, 200 mg/dl on October 23, 2020, very small to measure on [facility] on 7/20/20, 200 mg/dl on 4/22/2020, 200 mg/dl on 1/21/20, 430 mg/dl on 10/23/19, 200 mg on 7/23/19,  400mg on 4/23/19, 1000mg on  1/22/19 and 2100 mg/dL on blood test done on November 20, 2018. I believe she is responding very well to Revlimid and I recommend her to continue with that for now. I will see her again in three months and I will repeat all the blood tests again a week before next appointment.      She has mild neuropathy in her right upper extremity, but it is less likely to be related to revlimid. Will continue to monitor it for now. Skin lesions - s/p excision by dermatology. Path still pending. To continue with niacinamide. Osteoporosis - her primary care physician started fosamax. I recommend her to continue with that for now. Chemo induced neutropenia -  Stable and will continue to monitor it for now. COVID vaccine - She already received her COVID19 vaccine. VTE prophylaxis - I recommend her to continue with aspirin 81 mg daily. Hypertension - She is on atenolol. SBP is normal today. I recommend salt restriction. Chemo induced neuropathy - stable. To continue with cymbalta 60 mg daily. Health maintenance - I recommend her to have age-appropriate cancer screening, exercise, low-fat and low-sodium diet. Besides that, she does not have any other significant symptoms on  today's visit. PAST MEDICAL HISTORY:  Significant for:  1. Hypertension. 2.         Melanoma. 3.         Anxiety disorder. PAST SURGICAL HISTORY:  Significant for:  1. Total abdominal hysterectomy in 1999.  2.         Left lower extremity excision and biopsy for melanoma in 2006. 3.         Excision and biopsy of right shoulder melanoma in 2010. FAMILY HISTORY:  Significant for breast cancer in her mother. No other family history of cancer disease. SOCIAL HISTORY:  She denies smoking and illicit drug abuse. She socially drinks alcohol. She is  and she is currently living in [facility]. ALLERGIES:  No known drug allergies. Oncology History    No history exists. Review of Systems: \"Per interval history; otherwise 10 point ROS is negative. \"  Her energy level is good and her sleep is fine. She doesn't have fever, chills, night sweats, cough, shortness of breath, chest pain, hemoptysis or palpitations.  Her bowel and bladder functions are normal. She denies nausea, vomiting, abdominal pain, diarrhea, constipation, dysuria, loss of appetite, or weight loss. She has mild stable neuropathy in her right upper extremity. She doesn't have bleeding or clotting issues. She denies any pain in her body. Has mild anxiety. No depression. The rest of the systems are unremarkable. Vital Signs:  BP (!) 147/65 (Site: Right Upper Arm, Position: Sitting, Cuff Size: Medium Adult)   Pulse 59   Temp 96.5 °F (35.8 °C) (Temporal)   Ht 5' 4\" (1.626 m)   Wt 163 lb (73.9 kg)   SpO2 96%   BMI 27.98 kg/m²     Physical Exam:  CONSTITUTIONAL: awake, alert, cooperative, no apparent distress   EYES: pupils equal, round and reactive to light, sclera clear and conjunctiva normal  ENT: Normocephalic, without obvious abnormality, atraumatic  NECK: supple, symmetrical, no jugular venous distension and no carotid bruits   HEMATOLOGIC/LYMPHATIC: no cervical, supraclavicular or axillary lymphadenopathy   LUNGS: VBS, no wheezes, clear to auscultation, no crackles, no increased work of breathing, no rhonchi,    CARDIOVASCULAR: regular rate and rhythm, normal S1 and S2, no murmur noted  ABDOMEN: soft, non-distended, non-tender, normal bowel sounds x 4, no masses palpated, no hepatosplenomegaly   MUSCULOSKELETAL: full range of motion noted, tone is normal  NEUROLOGIC: awake, alert, oriented to name, place and time. Motor skills grossly intact. SKIN: Normal skin color, texture, turgor and no jaundice.  appears intact   EXTREMITIES: no LE edema, no cyanosis, no clubbing, no leg swelling,      Labs:  Hematology:  Lab Results   Component Value Date    WBC 2.2 (L) 01/18/2022    RBC 3.97 (L) 01/18/2022    HGB 12.6 01/18/2022    HCT 36.7 (L) 01/18/2022    MCV 92.4 01/18/2022    MCH 31.7 (H) 01/18/2022    MCHC 34.3 01/18/2022    RDW 15.0 (H) 01/18/2022     01/18/2022    MPV 11.3 (H) 01/18/2022    BANDSPCT 4 (L) 09/17/2015    SEGSPCT 56.6 01/18/2022    EOSRELPCT 4.1 (H) 01/18/2022    BASOPCT 0.5 01/18/2022    LYMPHOPCT 23.3 (L) 01/18/2022 MONOPCT 15.5 (H) 01/18/2022    BANDABS 0.17 09/17/2015    SEGSABS 1.2 01/18/2022    EOSABS 0.1 01/18/2022    BASOSABS 0.0 01/18/2022    LYMPHSABS 0.5 01/18/2022    MONOSABS 0.3 01/18/2022    DIFFTYPE AUTOMATED DIFFERENTIAL 01/18/2022    WBCMORP RARE 09/17/2015     Lab Results   Component Value Date    ESR 2 01/18/2022     Chemistry:  Lab Results   Component Value Date     01/18/2022    K 3.9 01/18/2022    CL 99 01/18/2022    CO2 27 01/18/2022    BUN 13 01/18/2022    CREATININE 1.0 01/18/2022    GLUCOSE 94 01/18/2022    CALCIUM 9.0 01/18/2022    PROT 6.5 01/18/2022    PROT 6.5 01/18/2022    LABALBU 4.2 01/18/2022    BILITOT 0.6 01/18/2022    ALKPHOS 58 01/18/2022    AST 24 01/18/2022    ALT 23 01/18/2022    LABGLOM 55 (L) 01/18/2022    GFRAA >60 01/18/2022    AGRATIO 0.3 (L) 09/09/2015    GLOB 10.1 09/09/2015    PHOS 3.8 10/09/2015    MG 2.0 10/09/2015     Lab Results   Component Value Date     01/18/2022     No components found for: LD  Lab Results   Component Value Date    TSHHS 2.120 07/05/2018     Immunology:  Lab Results   Component Value Date    PROT 6.5 01/18/2022    PROT 6.5 01/18/2022    SPEP  01/18/2022     INTERPRETATION - Faint possible IgG kappa band noted. Seema Mckinnon md    SPEP  01/18/2022     INTERPRETATION - Within normal limits. Please correlate wtih correpsonding BRENT which shows faint possible IgG kappa band. Previous history of monoclonal gammopathy noted.   Seema Mckinnon MD    ALBUMINELP 3.8 01/18/2022    LABALPH 0.2 01/18/2022    LABALPH 0.6 01/18/2022    LABBETA 0.9 01/18/2022    GAMGLOB 1.1 01/18/2022     Lab Results   Component Value Date    KAPPAUVOL 25.46 (H) 01/18/2022    LAMBDAUVOL 24.98 10/23/2020    KLFLCR 1.30 01/18/2022     Lab Results   Component Value Date    B2M 1.9 01/18/2022     Coagulation Panel:  No results found for: PROTIME, INR, APTT, DDIMER  Anemia Panel:  No results found for: QHGPZKQI55, FOLATE  Tumor Markers:  No results found for: , CEA, , LABCA2, PSA  Observations:  PHQ-9 Total Score: 0 (1/24/2022 10:28 AM)        Assessment & Plan:   1. Symptomatic IgG Kappa multiple myeloma. 2.         Normocytic normochromic anemia - due to multiple myeloma. PLAN:  Ms. Siobhan Tapia has been followed for IgG Kappa multiple myeloma. On January 24, 2022, she presented to me for followup. I have been following Ms. Serrano for IgG Kappa multiple myeloma and she is currently on maintenance chemotherapy with Revlimid (10 mg daily) since December 6, 2018. She is tolerating revlimid very well and she doesn't encounter any major side effects from it. I recognize that her monoclonal protein was faint IgG kappa only on 1/18/22. It was too small to measure on 10/15/21, 200 mg/dl on 7/22/21, 300 mg/dl on 4/22/21, 200mg/dl on 1/22/21, 200 mg/dl on October 23, 2020, very small to measure on [facility] on 7/20/20, 200 mg/dl on 4/22/2020, 200 mg/dl on 1/21/20, 430 mg/dl on 10/23/19, 200 mg on 7/23/19,  400mg on 4/23/19, 1000mg on  1/22/19 and 2100 mg/dL on blood test done on November 20, 2018. I believe she is responding very well to Revlimid and I recommend her to continue with that for now. I will see her again in three months and I will repeat all the blood tests again a week before next appointment. She has mild neuropathy in her right upper extremity, but it is less likely to be related to revlimid. Will continue to monitor it for now. Skin lesions - s/p excision by dermatology. Path still pending. To continue with niacinamide. Osteoporosis - her primary care physician started fosamax. I recommend her to continue with that for now. Chemo induced neutropenia -  Stable and will continue to monitor it for now. COVID vaccine - She already received her COVID19 vaccine. VTE prophylaxis - I recommend her to continue with aspirin 81 mg daily. Hypertension - She is on atenolol. SBP is normal today. I recommend salt restriction.      Chemo induced neuropathy - stable. To continue with cymbalta 60 mg daily. Health maintenance - I recommend her to have age-appropriate cancer screening, exercise, low-fat and low-sodium diet. I answered all her questions and concerns for today. I asked her to follow up with her primary care physician on regular basis. Recent imaging and labs were reviewed and discussed with the patient.

## 2022-01-24 NOTE — PROGRESS NOTES
MA Rooming Questions  Patient: Marty Ponce  MRN: [id number]    Date: 1/24/2022        1. Do you have any new issues?   no         2. Do you need any refills on medications?    no    3. Have you had any imaging done since your last visit? yes - 01/18    4. Have you been hospitalized or seen in the emergency room since your last visit here?   no    5. Did the patient have a depression screening completed today?  Yes    PHQ-9 Total Score: 0 (1/24/2022 10:28 AM)       PHQ-9 Given to (if applicable):               PHQ-9 Score (if applicable):                     [] Positive     []  Negative              Does question #9 need addressed (if applicable)                     [] Yes    []  No               Palmira Allen CMA

## 2022-01-25 ENCOUNTER — OFFICE VISIT (OUTPATIENT)
Dept: INTERNAL MEDICINE CLINIC | Age: 67
End: 2022-01-25
Payer: MEDICARE

## 2022-01-25 VITALS
OXYGEN SATURATION: 98 % | BODY MASS INDEX: 28.12 KG/M2 | SYSTOLIC BLOOD PRESSURE: 136 MMHG | HEART RATE: 57 BPM | DIASTOLIC BLOOD PRESSURE: 76 MMHG | WEIGHT: 163.8 LBS | RESPIRATION RATE: 14 BRPM

## 2022-01-25 DIAGNOSIS — C90.00 MULTIPLE MYELOMA NOT HAVING ACHIEVED REMISSION (HCC): ICD-10-CM

## 2022-01-25 DIAGNOSIS — E78.2 MIXED HYPERLIPIDEMIA: ICD-10-CM

## 2022-01-25 DIAGNOSIS — G62.0 DRUG-INDUCED POLYNEUROPATHY (HCC): ICD-10-CM

## 2022-01-25 DIAGNOSIS — I10 ESSENTIAL HYPERTENSION: Primary | ICD-10-CM

## 2022-01-25 PROCEDURE — 99214 OFFICE O/P EST MOD 30 MIN: CPT | Performed by: INTERNAL MEDICINE

## 2022-01-25 RX ORDER — AMLODIPINE BESYLATE 2.5 MG/1
2.5 TABLET ORAL DAILY
Qty: 90 TABLET | Refills: 3 | Status: SHIPPED | OUTPATIENT
Start: 2022-01-25

## 2022-01-25 NOTE — PROGRESS NOTES
Carly Rater Zach  1955 01/25/22    SUBJECTIVE:    Pt was seen by Dr Danielle Jefferson yesterday, doing very well with the myeloma. She continues on revlimid. She tolerates the med well. She had a squamous cell removed in the fall; 5 lesions removed last week. The patient is taking hypertensive medications compliantly without side effects. Denies chest pain, dyspnea, edema, or TIA's. Blood pressure has been 386-995 systolic. Cymbalta is very beneficial - she does \"not feel like I am in a fog. \" Neuropathy symptoms are also improved. OBJECTIVE:    /76   Pulse 57   Resp 14   Wt 163 lb 12.8 oz (74.3 kg)   SpO2 98%   BMI 28.12 kg/m²     Physical Exam  Constitutional:       Appearance: She is well-developed. Eyes:      General: No scleral icterus. Conjunctiva/sclera: Conjunctivae normal.   Neck:      Thyroid: No thyromegaly. Trachea: No tracheal deviation. Cardiovascular:      Rate and Rhythm: Normal rate and regular rhythm. Heart sounds: No murmur heard. No friction rub. No gallop. Pulmonary:      Effort: No respiratory distress. Breath sounds: No wheezing or rales. Abdominal:      General: Bowel sounds are normal. There is no distension. Palpations: Abdomen is soft. There is no hepatomegaly or mass. Tenderness: There is no abdominal tenderness. There is no guarding or rebound. Musculoskeletal:      Cervical back: Neck supple. Lymphadenopathy:      Cervical: No cervical adenopathy. Skin:     Nails: There is no clubbing. Neurological:      Mental Status: She is alert and oriented to person, place, and time. Psychiatric:         Behavior: Behavior normal.         Judgment: Judgment normal.         ASSESSMENT:    1. Essential hypertension    2. Multiple myeloma not having achieved remission ([address]    3. Drug-induced polyneuropathy ([address]    4. Mixed hyperlipidemia        PLAN:    Brooklyn Humphries was seen today for 6 month follow-up.     Diagnoses and all orders for this visit:    Essential hypertension - still slightly elevated; will add amlodipine  -     amLODIPine (NORVASC) 2.5 MG tablet; Take 1 tablet by mouth daily    Multiple myeloma not having achieved remission ([facility] - doing very well with revlimid; no change in mgmt    Drug-induced polyneuropathy ([facility]) - no current symptoms; no change in mgmt    Mixed hyperlipidemia - check labs  -     Lipid Panel;  Future

## 2022-02-08 ENCOUNTER — CLINICAL DOCUMENTATION (OUTPATIENT)
Dept: ONCOLOGY | Age: 67
End: 2022-02-08

## 2022-02-08 DIAGNOSIS — C90.00 MULTIPLE MYELOMA, REMISSION STATUS UNSPECIFIED (HCC): Primary | ICD-10-CM

## 2022-02-08 RX ORDER — LENALIDOMIDE 10 MG/1
10 CAPSULE ORAL DAILY
Qty: 28 CAPSULE | Refills: 0 | Status: ACTIVE | OUTPATIENT
Start: 2022-02-08 | End: 2022-03-08 | Stop reason: SDUPTHER

## 2022-02-08 NOTE — PROGRESS NOTES
Revlimid 10 mg capsule refilled with [facility] pharmacy. [employer] Auth # [id number] obtained. Qty 28 capsules ordered.

## 2022-02-08 NOTE — PROGRESS NOTES
Medication is currently being filled at [facility] and has been routed there. Please call us with any questions at [phone] opt.  6.

## 2022-02-18 DIAGNOSIS — I10 ESSENTIAL HYPERTENSION: ICD-10-CM

## 2022-02-18 RX ORDER — ATENOLOL 50 MG/1
TABLET ORAL
Qty: 90 TABLET | Refills: 0 | Status: SHIPPED | OUTPATIENT
Start: 2022-02-18 | End: 2022-05-23

## 2022-03-02 ENCOUNTER — HOSPITAL ENCOUNTER (OUTPATIENT)
Dept: WOMENS IMAGING | Age: 67
Discharge: HOME | End: 2022-03-02
Payer: MEDICARE

## 2022-03-02 DIAGNOSIS — Z12.31 SCREENING MAMMOGRAM, ENCOUNTER FOR: ICD-10-CM

## 2022-03-02 PROCEDURE — 77063 BREAST TOMOSYNTHESIS BI: CPT

## 2022-03-04 RX ORDER — DULOXETIN HYDROCHLORIDE 60 MG/1
CAPSULE, DELAYED RELEASE ORAL
Qty: 30 CAPSULE | Refills: 11 | Status: SHIPPED | OUTPATIENT
Start: 2022-03-04

## 2022-03-08 ENCOUNTER — CLINICAL DOCUMENTATION (OUTPATIENT)
Dept: ONCOLOGY | Age: 67
End: 2022-03-08

## 2022-03-08 DIAGNOSIS — C90.00 MULTIPLE MYELOMA, REMISSION STATUS UNSPECIFIED (HCC): Primary | ICD-10-CM

## 2022-03-08 RX ORDER — LENALIDOMIDE 10 MG/1
10 CAPSULE ORAL DAILY
Qty: 28 CAPSULE | Refills: 0 | Status: ACTIVE | OUTPATIENT
Start: 2022-03-08 | End: 2022-04-05 | Stop reason: SDUPTHER

## 2022-03-08 NOTE — PROGRESS NOTES
[address] [facility] Update    Date: 03/08/22    Patient's prescription benefits are being verified for coverage. Status update to follow as soon as possible. Please call us with any questions at [phone] opt.  6.

## 2022-03-08 NOTE — PROGRESS NOTES
[address] Update    Date: 03/08/22    Medication is currently being filled at [facility] and has been routed there. Please call us with any questions at [phone] opt.  [address] Update

## 2022-03-08 NOTE — PROGRESS NOTES
Revlimid 10 mg capsule refilled with [facility] pharmacy. [email or web address] Auth # [id number] obtained. Qty 28 capsules ordered.

## 2022-03-10 ENCOUNTER — HOSPITAL ENCOUNTER (OUTPATIENT)
Dept: WOMENS IMAGING | Age: 67
Discharge: HOME | End: 2022-03-10
Payer: MEDICARE

## 2022-03-10 ENCOUNTER — HOSPITAL ENCOUNTER (OUTPATIENT)
Dept: ULTRASOUND IMAGING | Age: 67
End: 2022-03-10
Payer: MEDICARE

## 2022-03-10 DIAGNOSIS — R92.8 ABNORMAL MAMMOGRAM: ICD-10-CM

## 2022-03-10 PROCEDURE — G0279 TOMOSYNTHESIS, MAMMO: HCPCS

## 2022-04-05 ENCOUNTER — CLINICAL DOCUMENTATION (OUTPATIENT)
Dept: ONCOLOGY | Age: 67
End: 2022-04-05

## 2022-04-05 DIAGNOSIS — C90.00 MULTIPLE MYELOMA, REMISSION STATUS UNSPECIFIED (HCC): Primary | ICD-10-CM

## 2022-04-05 RX ORDER — LENALIDOMIDE 10 MG/1
10 CAPSULE ORAL DAILY
Qty: 28 CAPSULE | Refills: 0 | Status: ACTIVE | OUTPATIENT
Start: 2022-04-05 | End: 2022-05-02 | Stop reason: SDUPTHER

## 2022-04-05 NOTE — PROGRESS NOTES
Refill for Revlimid sent to [facility] pharmacy. Rx Revlimid 10 mg daily. QTY 28.   [employer] Auth # [id number].

## 2022-04-05 NOTE — PROGRESS NOTES
[address] [facility] Update    Date: 04/05/22    Patient's prescription benefits are being verified for coverage. Status update to follow as soon as possible. Please call us with any questions at [phone] opt.  6.

## 2022-04-05 NOTE — PROGRESS NOTES
[facility] [email or web address]    Date: 04/05/22    Medication is currently being filled at [facility] and has been routed there. Please call us with any questions at [phone] opt.  6.

## 2022-04-13 ENCOUNTER — HOSPITAL ENCOUNTER (OUTPATIENT)
Dept: INFUSION THERAPY | Age: 67
Discharge: HOME | End: 2022-04-13
Payer: MEDICARE

## 2022-04-13 ENCOUNTER — HOSPITAL ENCOUNTER (OUTPATIENT)
Age: 67
Setting detail: SPECIMEN
Discharge: HOME | End: 2022-04-13
Payer: MEDICARE

## 2022-04-13 DIAGNOSIS — C90.00 MULTIPLE MYELOMA, REMISSION STATUS UNSPECIFIED (HCC): ICD-10-CM

## 2022-04-13 LAB
ALBUMIN SERPL-MCNC: 4.1 GM/DL (ref 3.4–5)
ALP BLD-CCNC: 61 IU/L (ref 40–129)
ALT SERPL-CCNC: 22 U/L (ref 10–40)
ANION GAP SERPL CALCULATED.3IONS-SCNC: 10 MMOL/L (ref 4–16)
AST SERPL-CCNC: 22 IU/L (ref 15–37)
BASOPHILS ABSOLUTE: 0 K/CU MM
BASOPHILS RELATIVE PERCENT: 1.1 % (ref 0–1)
BILIRUB SERPL-MCNC: 0.7 MG/DL (ref 0–1)
BUN BLDV-MCNC: 16 MG/DL (ref 6–23)
CALCIUM SERPL-MCNC: 8.7 MG/DL (ref 8.3–10.6)
CHLORIDE BLD-SCNC: 101 MMOL/L (ref 99–110)
CHOLESTEROL, FASTING: 173 MG/DL
CO2: 25 MMOL/L (ref 21–32)
CREAT SERPL-MCNC: 0.9 MG/DL (ref 0.6–1.1)
DIFFERENTIAL TYPE: ABNORMAL
EOSINOPHILS ABSOLUTE: 0.1 K/CU MM
EOSINOPHILS RELATIVE PERCENT: 5.2 % (ref 0–3)
ERYTHROCYTE SEDIMENTATION RATE: 1 MM/HR (ref 0–30)
GFR AFRICAN AMERICAN: >60 ML/MIN/1.73M2
GFR NON-AFRICAN AMERICAN: >60 ML/MIN/1.73M2
GLUCOSE BLD-MCNC: 99 MG/DL (ref 70–99)
HCT VFR BLD CALC: 36.9 % (ref 37–47)
HDLC SERPL-MCNC: 78 MG/DL
HEMOGLOBIN: 12.7 GM/DL (ref 12.5–16)
IGA: 202 MG/DL (ref 69–382)
IGG,SERUM: 1075 MG/DL (ref 723–1685)
IGM,SERUM: 37 MG/DL (ref 62–277)
LACTATE DEHYDROGENASE: 152 IU/L (ref 120–246)
LDL CHOLESTEROL CALCULATED: 72 MG/DL
LYMPHOCYTES ABSOLUTE: 0.4 K/CU MM
LYMPHOCYTES RELATIVE PERCENT: 21.3 % (ref 24–44)
MCH RBC QN AUTO: 31.9 PG (ref 27–31)
MCHC RBC AUTO-ENTMCNC: 34.4 % (ref 32–36)
MCV RBC AUTO: 92.7 FL (ref 78–100)
MONOCYTES ABSOLUTE: 0.4 K/CU MM
MONOCYTES RELATIVE PERCENT: 22.4 % (ref 0–4)
PDW BLD-RTO: 15 % (ref 11.7–14.9)
PLATELET # BLD: 179 K/CU MM (ref 140–440)
PMV BLD AUTO: 11.2 FL (ref 7.5–11.1)
POTASSIUM SERPL-SCNC: 3.9 MMOL/L (ref 3.5–5.1)
RBC # BLD: 3.98 M/CU MM (ref 4.2–5.4)
SEGMENTED NEUTROPHILS ABSOLUTE COUNT: 0.9 K/CU MM
SEGMENTED NEUTROPHILS RELATIVE PERCENT: 50 % (ref 36–66)
SODIUM BLD-SCNC: 136 MMOL/L (ref 135–145)
TOTAL PROTEIN: 6.4 GM/DL (ref 6.4–8.2)
TRIGLYCERIDE, FASTING: 117 MG/DL
WBC # BLD: 1.7 K/CU MM (ref 4–10.5)

## 2022-04-13 PROCEDURE — 36415 COLL VENOUS BLD VENIPUNCTURE: CPT

## 2022-04-13 PROCEDURE — 84165 PROTEIN E-PHORESIS SERUM: CPT

## 2022-04-13 PROCEDURE — 80053 COMPREHEN METABOLIC PANEL: CPT

## 2022-04-13 PROCEDURE — 85652 RBC SED RATE AUTOMATED: CPT

## 2022-04-13 PROCEDURE — 82232 ASSAY OF BETA-2 PROTEIN: CPT

## 2022-04-13 PROCEDURE — 84155 ASSAY OF PROTEIN SERUM: CPT

## 2022-04-13 PROCEDURE — 82784 ASSAY IGA/IGD/IGG/IGM EACH: CPT

## 2022-04-13 PROCEDURE — 83615 LACTATE (LD) (LDH) ENZYME: CPT

## 2022-04-13 PROCEDURE — 80061 LIPID PANEL: CPT

## 2022-04-13 PROCEDURE — 83883 ASSAY NEPHELOMETRY NOT SPEC: CPT

## 2022-04-13 PROCEDURE — 85025 COMPLETE CBC W/AUTO DIFF WBC: CPT

## 2022-04-15 LAB
BETA-2 MICROGLOBULIN: 1.9 MG/L
KAPPA QUANT FREE LIGHT CHAINS: 29.48 MG/L (ref 3.3–19.4)
KAPPA/LAMBDA FREE LIGHT CHAIN RATIO: 1.31 (ref 0.26–1.65)
LAMBDA FREE LIGHT CHAINS QNT: 22.47 MG/L (ref 5.71–26.3)

## 2022-04-17 LAB
ALBUMIN SERPL-MCNC: 3.6 G/DL (ref 3.75–5.01)
ALPHA-1-GLOBULIN: 0.31 G/DL (ref 0.19–0.46)
ALPHA-2-GLOBULIN: 0.62 G/DL (ref 0.48–1.05)
BETA GLOBULIN: 0.77 G/DL (ref 0.48–1.1)
GAMMA: 1.09 G/DL (ref 0.62–1.51)
IMMUNOFIXATION REFLEX: ABNORMAL
PROTEIN ELECTROPHORESIS, SERUM: ABNORMAL
SPE/IFE INTERPRETATION: ABNORMAL
TOTAL PROTEIN: 6.4 G/DL (ref 6.3–8.2)

## 2022-04-27 NOTE — PROGRESS NOTES
Patient Name: Sukumar Dutton  Patient : 1955  Patient MRN: [id number]     Primary Oncologist: Mekhi Weeks MD  Referring Provider: Oswaldo Vail MD     Date of Service: 2022      Chief Complaint:   Chief Complaint   Patient presents with    Follow-up     Patient Active Problem List:     Multiple myeloma     HPI:  Lorne Kearney is a 49-year-old very pleasant female with medical history significant for hypertension, anxiety disorder, and anemia since 2015, initially referred to me for evaluation for her normocytic normochromic anemia. She stated that she has been having right lateral chest wall pain starting May 2015. She had chest x-ray done in 2015 and it showed left sixth rib fracture. Her pain has been controlled with ibuprofen since then. She was found to have anemia (hemoglobin 9.3) on nd she was started with oral iron supplementation. Her anemia got worse on repeat blood test done on 2015, (hemoglobin 8.1). She had chest x-ray and ultrasound of the abdomen on 2015, and they were normal.      She had a colonoscopy by Dr. Arron Adam about three to four years ago which was normal according to her. She also had history of melanoma twice in the past (left lower extremity melanoma which was excised in  and right shoulder melanoma which was excised in ). She was also found to have elevated total protein by primary care physician. Because of her normocytic normochromic anemia associated with elevated total protein, she was subsequently referred to me for evaluation of possible plasma cell dyscrasia. She denies fever, night sweats, loss of appetite, and weight loss. She does not have any other significant symptoms except right lower chest wall pain.       Laboratory workups done on 2015, showed persistent normocytic normochromic anemia (hemoglobin 8.2 and MCV 89), persisted elevated total protein (total protein 14.1), normal calcium (calcium 9.9), slight elevation in serum creatinine (creatinine 1.2), elevated Beta-2 microglobulin (6.5), and elevated ESR (more than 120). Serum protein electrophoresis and immunofixation was sent on September 14, 2015, and it showed monoclonal IgG Kappa protein of 6.8 gram/dL. Serum Kappa light chain was 16.9 and serum Lambda light chain was less than 0.16. I did bone marrow biopsy on September 19, 2015, and the bone marrow aspirate flow cytometry showed 38 percent plasma cell population consistent with plasma cell neoplasm. Bone marrow biopsy  stain was about 98 percent positive for  positive plasma cell. 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 Gain of 1q has been associated with advanced disease and it is considered a high risk indicator. Cytogenetic studies showed normal female karyotype (46XX). Ms. Christal Rader had a PET-CT scan on October 1, 2015, and it showed no metabolically active lytic lesions. Focal skin thickening in the mid medial right cuff with associated metabolic activity. This is non-specific and can be seen with infectious or inflammatory process; however, given the history of melanoma, recommend correlation with direct visualization. Ms. Christal Rader was started with chemotherapy (bortezomib, cyclophosphamide, and dexamethasone) on October 2, 2015. Ms. Christal Rader was seen by Dr. Tomás German at [facility] for possible autologous stem cell transplantation. Dr. Tomás German and Ms. Zach decided to have stem cell collection for future use. Stem cell was collected on the first week of March 2016. Our plan was to continue with current chemotherapy followed by maintenance chemotherapy.  Dr. Tomás German will consider autologous stem cell transplantation when her disease becomes refractory or progressed. Ms. Moiz Hagen completed her eight cycles of chemotherapy with cyclophosphamide, bortezomib, and dexamethasone on July 8, 2016. Maintenance chemotherapy with Velcade and dexamethasone was started on August 2, 2016 and she completed it on June 12, 2018. I recognized that her monoclonal protein went up to 1100 mg/dL on recent blood test done on July 18, 2018. She was subsequently evaluated by Dr. Andie Mishra and she believes it is due to stopping velcade. She recommend to resume maintenance chemotherapy. She prefers Nilaro maintenance over velcade because of ease of administration. Maintenance chemotherapy with Mickey Aiken was started on August 20, 2018 and we stopped it on November 27, 2018 since she has biochemical progression of the disease. We decided to change it to maintenance chemotherapy with Revlimid. Revlimid 10 mg daily was started on December 6, 2018. On April 28, 2022, she presented to me for followup. I have been following Ms. Serrano for IgG Kappa multiple myeloma and she is currently on maintenance chemotherapy with Revlimid (10 mg daily) since December 6, 2018. She is tolerating revlimid very well and she doesn't encounter any major side effects from it. I recognize that her monoclonal protein was faint IgG kappa only on 4/13/22. It was faint IgG kappa only on 1/18/22, too small to measure on 10/15/21, 200 mg/dl on 7/22/21, 300 mg/dl on 4/22/21, 200mg/dl on 1/22/21, 200 mg/dl on October 23, 2020, very small to measure on [facility] on 7/20/20, 200 mg/dl on 4/22/2020, 200 mg/dl on 1/21/20, 430 mg/dl on 10/23/19, 200 mg on 7/23/19,  400mg on 4/23/19, 1000mg on  1/22/19 and 2100 mg/dL on blood test done on November 20, 2018. I believe she is responding very well to Revlimid and I recommend her to continue with that for now. I will see her again in three months and I will repeat all the blood tests again a week before next appointment.      She complains of pain and swelling in her b/l legs. She returned recently from [place] (drove back and forth). I will request b/l lower extremity doppler to r/o DVT. She has mild neuropathy in her right upper extremity, but it is less likely to be related to revlimid. Will continue to monitor it for now. Skin lesions - s/p excision by dermatology. Path still pending. To continue with niacinamide. Osteoporosis - her primary care physician started fosamax. I recommend her to continue with that for now. Chemo induced neutropenia -  Stable and will continue to monitor it for now. COVID vaccine - She already received her COVID19 vaccine. VTE prophylaxis - I recommend her to continue with aspirin 81 mg daily. Hypertension - She is on atenolol. SBP is normal today. I recommend salt restriction. Chemo induced neuropathy - stable. To continue with cymbalta 60 mg daily. Health maintenance - I recommend her to have age-appropriate cancer screening, exercise, low-fat and low-sodium diet. Besides that, she does not have any other significant symptoms on  today's visit. PAST MEDICAL HISTORY:  Significant for:  1. Hypertension. 2.         Melanoma. 3.         Anxiety disorder. PAST SURGICAL HISTORY:  Significant for:  1. Total abdominal hysterectomy in 1999.  2.         Left lower extremity excision and biopsy for melanoma in 2006. 3.         Excision and biopsy of right shoulder melanoma in 2010. FAMILY HISTORY:  Significant for breast cancer in her mother. No other family history of cancer disease. SOCIAL HISTORY:  She denies smoking and illicit drug abuse. She socially drinks alcohol. She is  and she is currently living in [facility]. ALLERGIES:  No known drug allergies. Oncology History    No history exists. Review of Systems: \"Per interval history; otherwise 10 point ROS is negative. \"  Her energy level is stable and her sleep is good.  She denies fever, chills, night sweats, cough, shortness of breath, chest pain, hemoptysis or palpitations. Her bowel and bladder functions are normal. She doesn't have nausea, vomiting, abdominal pain, diarrhea, constipation, dysuria, loss of appetite or weight loss. She has mild stable neuropathy in her right upper extremity. She denies bleeding or clotting issues. She doesn't have any pain in her body. Has mild anxiety. Denies depression. The rest of the systems are unremarkable. Vital Signs:  BP (!) 152/71 (Site: Left Upper Arm, Position: Sitting, Cuff Size: Medium Adult)   Pulse 64   Temp 97.3 °F (36.3 °C) (Infrared)   Resp 16   Ht 5' 4\" (1.626 m)   Wt 161 lb (73 kg)   SpO2 97%   BMI 27.64 kg/m²     Physical Exam:  CONSTITUTIONAL: awake, alert, cooperative, no apparent distress   EYES: pupils equal, round and reactive to light, sclera clear and conjunctiva normal  ENT: Normocephalic, without obvious abnormality, atraumatic  NECK: supple, symmetrical, no jugular venous distension and no carotid bruits   HEMATOLOGIC/LYMPHATIC: no cervical, supraclavicular or axillary lymphadenopathy   LUNGS: VBS, no wheezes, no increased work of breathing, no rhonchi, clear to auscultation, no crackles,    CARDIOVASCULAR: regular rate and rhythm, normal S1 and S2, no murmur noted  ABDOMEN: soft, non-distended, non-tender, normal bowel sounds x 4, no masses palpated, no hepatosplenomegaly   MUSCULOSKELETAL: full range of motion noted, tone is normal  NEUROLOGIC: awake, alert, oriented to name, place and time. Motor skills grossly intact. SKIN: Normal skin color, texture, turgor and no jaundice.  appears intact   EXTREMITIES: no cyanosis, no clubbing, no LE edema, no leg swelling,      Labs:  Hematology:  Lab Results   Component Value Date    WBC 1.7 (LL) 04/13/2022    RBC 3.98 (L) 04/13/2022    HGB 12.7 04/13/2022    HCT 36.9 (L) 04/13/2022    MCV 92.7 04/13/2022    MCH 31.9 (H) 04/13/2022    MCHC 34.4 04/13/2022    RDW 15.0 (H) 04/13/2022     04/13/2022    MPV 11.2 (H) 04/13/2022    BANDSPCT 4 (L) 09/17/2015    SEGSPCT 50.0 04/13/2022    EOSRELPCT 5.2 (H) 04/13/2022    BASOPCT 1.1 (H) 04/13/2022    LYMPHOPCT 21.3 (L) 04/13/2022    MONOPCT 22.4 (H) 04/13/2022    BANDABS 0.17 09/17/2015    SEGSABS 0.9 04/13/2022    EOSABS 0.1 04/13/2022    BASOSABS 0.0 04/13/2022    LYMPHSABS 0.4 04/13/2022    MONOSABS 0.4 04/13/2022    DIFFTYPE AUTOMATED DIFFERENTIAL 04/13/2022    WBCMORP RARE 09/17/2015     Lab Results   Component Value Date    ESR 1 04/13/2022     Chemistry:  Lab Results   Component Value Date     04/13/2022    K 3.9 04/13/2022     04/13/2022    CO2 25 04/13/2022    BUN 16 04/13/2022    CREATININE 0.9 04/13/2022    GLUCOSE 99 04/13/2022    CALCIUM 8.7 04/13/2022    PROT 6.4 04/13/2022    PROT 6.4 04/13/2022    LABALBU 3.60 (L) 04/13/2022    LABALBU 4.1 04/13/2022    BILITOT 0.7 04/13/2022    ALKPHOS 61 04/13/2022    AST 22 04/13/2022    ALT 22 04/13/2022    LABGLOM >60 04/13/2022    GFRAA >60 04/13/2022    AGRATIO 0.3 (L) 09/09/2015    GLOB 10.1 09/09/2015    PHOS 3.8 10/09/2015    MG 2.0 10/09/2015     Lab Results   Component Value Date     04/13/2022     No components found for: LD  Lab Results   Component Value Date    TSHHS 2.120 07/05/2018     Immunology:  Lab Results   Component Value Date    PROT 6.4 04/13/2022    PROT 6.4 04/13/2022    SPEP  01/18/2022     INTERPRETATION - Faint possible IgG kappa band noted. Josse Mo md    SPEP  01/18/2022     INTERPRETATION - Within normal limits. Please correlate wtih correpsonding BRENT which shows faint possible IgG kappa band. Previous history of monoclonal gammopathy noted.   Josse Mo MD    ALBUMINELP 3.8 01/18/2022    LABALPH 0.31 04/13/2022    LABALPH 0.62 04/13/2022    LABBETA 0.77 04/13/2022    GAMGLOB 1.1 01/18/2022     Lab Results   Component Value Date    KAPPAUVOL 29.48 (H) 04/13/2022    LAMBDAUVOL 24.98 10/23/2020    KLFLCR 1.31 04/13/2022     Lab Results   Component Value Date    B2M 1.9 04/13/2022     Coagulation Panel:  No results found for: PROTIME, INR, APTT, DDIMER  Anemia Panel:  No results found for: LMVWOQXX19, FOLATE  Tumor Markers:  No results found for: , CEA, , LABCA2, PSA  Observations:  No data recorded        Assessment & Plan:   1. Symptomatic IgG Kappa multiple myeloma. 2.         Normocytic normochromic anemia - due to multiple myeloma. PLAN:  Ms. Bekah Ospina has been followed for IgG Kappa multiple myeloma. On April 28, 2022, she presented to me for followup. I have been following Ms. Serrano for IgG Kappa multiple myeloma and she is currently on maintenance chemotherapy with Revlimid (10 mg daily) since December 6, 2018. She is tolerating revlimid very well and she doesn't encounter any major side effects from it. I recognize that her monoclonal protein was faint IgG kappa only on 4/13/22. It was faint IgG kappa only on 1/18/22, too small to measure on 10/15/21, 200 mg/dl on 7/22/21, 300 mg/dl on 4/22/21, 200mg/dl on 1/22/21, 200 mg/dl on October 23, 2020, very small to measure on [facility] on 7/20/20, 200 mg/dl on 4/22/2020, 200 mg/dl on 1/21/20, 430 mg/dl on 10/23/19, 200 mg on 7/23/19,  400mg on 4/23/19, 1000mg on  1/22/19 and 2100 mg/dL on blood test done on November 20, 2018. I believe she is responding very well to Revlimid and I recommend her to continue with that for now. I will see her again in three months and I will repeat all the blood tests again a week before next appointment. She complains of pain and swelling in her b/l legs. She returned recently from [place] (drove back and forth). I will request b/l lower extremity doppler to r/o DVT. She has mild neuropathy in her right upper extremity, but it is less likely to be related to revlimid. Will continue to monitor it for now. Skin lesions - s/p excision by dermatology. Path still pending. To continue with niacinamide.      Osteoporosis - her primary care physician started fosamax. I recommend her to continue with that for now. Chemo induced neutropenia -  Stable and will continue to monitor it for now. COVID vaccine - She already received her COVID19 vaccine. VTE prophylaxis - I recommend her to continue with aspirin 81 mg daily. Hypertension - She is on atenolol. SBP is normal today. I recommend salt restriction. Chemo induced neuropathy - stable. To continue with cymbalta 60 mg daily. I answered all her questions and concerns for today. I asked her to follow up with her primary care physician on regular basis. Recent imaging and labs were reviewed and discussed with the patient.

## 2022-04-28 ENCOUNTER — HOSPITAL ENCOUNTER (OUTPATIENT)
Dept: INFUSION THERAPY | Age: 67
Discharge: HOME | End: 2022-04-28

## 2022-04-28 ENCOUNTER — TELEPHONE (OUTPATIENT)
Dept: ONCOLOGY | Age: 67
End: 2022-04-28

## 2022-04-28 ENCOUNTER — OFFICE VISIT (OUTPATIENT)
Dept: ONCOLOGY | Age: 67
End: 2022-04-28
Payer: MEDICARE

## 2022-04-28 VITALS
TEMPERATURE: 97.3 F | OXYGEN SATURATION: 97 % | HEIGHT: 64 IN | WEIGHT: 161 LBS | SYSTOLIC BLOOD PRESSURE: 152 MMHG | RESPIRATION RATE: 16 BRPM | HEART RATE: 64 BPM | DIASTOLIC BLOOD PRESSURE: 71 MMHG | BODY MASS INDEX: 27.49 KG/M2

## 2022-04-28 DIAGNOSIS — C90.00 MULTIPLE MYELOMA, REMISSION STATUS UNSPECIFIED (HCC): Primary | ICD-10-CM

## 2022-04-28 DIAGNOSIS — M79.89 PAIN AND SWELLING OF LOWER LEG, UNSPECIFIED LATERALITY: Primary | ICD-10-CM

## 2022-04-28 DIAGNOSIS — C90.00 MULTIPLE MYELOMA, REMISSION STATUS UNSPECIFIED (HCC): ICD-10-CM

## 2022-04-28 DIAGNOSIS — M79.89 PAIN AND SWELLING OF LOWER LEG, UNSPECIFIED LATERALITY: ICD-10-CM

## 2022-04-28 DIAGNOSIS — M79.669 PAIN AND SWELLING OF LOWER LEG, UNSPECIFIED LATERALITY: ICD-10-CM

## 2022-04-28 DIAGNOSIS — M79.669 PAIN AND SWELLING OF LOWER LEG, UNSPECIFIED LATERALITY: Primary | ICD-10-CM

## 2022-04-28 PROCEDURE — 99214 OFFICE O/P EST MOD 30 MIN: CPT | Performed by: INTERNAL MEDICINE

## 2022-04-28 NOTE — PROGRESS NOTES
MA Rooming Questions  Patient: Rome Brownlee  MRN: [id number]    Date: 4/28/2022        1. Do you have any new issues?   no         2. Do you need any refills on medications?    no    3. Have you had any imaging done since your last visit?   no    4. Have you been hospitalized or seen in the emergency room since your last visit here?   no    5. Did the patient have a depression screening completed today?  No    No data recorded     PHQ-9 Given to (if applicable):               PHQ-9 Score (if applicable):                     [] Positive     []  Negative              Does question #9 need addressed (if applicable)                     [] Yes    []  No               Philip Esposito MA

## 2022-04-29 ENCOUNTER — HOSPITAL ENCOUNTER (OUTPATIENT)
Dept: ULTRASOUND IMAGING | Age: 67
Discharge: HOME | End: 2022-04-29
Payer: MEDICARE

## 2022-04-29 DIAGNOSIS — M79.669 PAIN AND SWELLING OF LOWER LEG, UNSPECIFIED LATERALITY: ICD-10-CM

## 2022-04-29 DIAGNOSIS — M79.89 PAIN AND SWELLING OF LOWER LEG, UNSPECIFIED LATERALITY: ICD-10-CM

## 2022-04-29 DIAGNOSIS — C90.00 MULTIPLE MYELOMA, REMISSION STATUS UNSPECIFIED (HCC): ICD-10-CM

## 2022-04-29 PROCEDURE — 93970 EXTREMITY STUDY: CPT

## 2022-05-02 DIAGNOSIS — C90.00 MULTIPLE MYELOMA, REMISSION STATUS UNSPECIFIED (HCC): Primary | ICD-10-CM

## 2022-05-02 RX ORDER — LENALIDOMIDE 10 MG/1
10 CAPSULE ORAL DAILY
Qty: 28 CAPSULE | Refills: 0 | Status: ACTIVE | OUTPATIENT
Start: 2022-05-02 | End: 2022-06-08 | Stop reason: SDUPTHER

## 2022-05-02 NOTE — PROGRESS NOTES
[facility]    Date: 05/02/22    Medication is currently being filled at [facility] and has been routed there. Please call us with any questions at [phone] opt.  2.

## 2022-05-21 DIAGNOSIS — I10 ESSENTIAL HYPERTENSION: ICD-10-CM

## 2022-05-23 RX ORDER — ATENOLOL 50 MG/1
TABLET ORAL
Qty: 90 TABLET | Refills: 0 | Status: SHIPPED | OUTPATIENT
Start: 2022-05-23 | End: 2022-08-22

## 2022-06-08 DIAGNOSIS — C90.00 MULTIPLE MYELOMA, REMISSION STATUS UNSPECIFIED (HCC): ICD-10-CM

## 2022-06-08 RX ORDER — LENALIDOMIDE 10 MG/1
10 CAPSULE ORAL DAILY
Qty: 28 CAPSULE | Refills: 0 | Status: ACTIVE | OUTPATIENT
Start: 2022-06-08 | End: 2022-07-07 | Stop reason: SDUPTHER

## 2022-06-08 NOTE — PROGRESS NOTES
Revlimid 10 chemo capsules reordered and sent to [facility]. [employer] #  [id number] obtained through [employer] risk management/[employer] portal. Auth valid for 30 days.

## 2022-06-08 NOTE — PROGRESS NOTES
[facility]    Date: 06/08/22    Medication is currently being filled at [facility] and has been routed there. Please call us with any questions at [phone] opt.  2.

## 2022-07-07 DIAGNOSIS — C90.00 MULTIPLE MYELOMA, REMISSION STATUS UNSPECIFIED (HCC): ICD-10-CM

## 2022-07-07 RX ORDER — LENALIDOMIDE 10 MG/1
10 CAPSULE ORAL DAILY
Qty: 28 CAPSULE | Refills: 0 | Status: ACTIVE | OUTPATIENT
Start: 2022-07-07 | End: 2022-08-04 | Stop reason: SDUPTHER

## 2022-07-07 NOTE — PROGRESS NOTES
Revlimid 10 mg chemo capsules reordered and sent to [facility]. Tyrus Fleischer # [id number] obtained through [employer] risk management/REMS portal. Auth valid for 30 days.

## 2022-07-07 NOTE — PROGRESS NOTES
[facility]    Date: 07/07/22    Medication is currently being filled at [facility] and has been routed there. Please call us with any questions at [phone] opt.  2.

## 2022-07-21 ENCOUNTER — HOSPITAL ENCOUNTER (OUTPATIENT)
Age: 67
Discharge: HOME | End: 2022-07-21
Payer: MEDICARE

## 2022-07-21 DIAGNOSIS — E78.2 MIXED HYPERLIPIDEMIA: ICD-10-CM

## 2022-07-21 LAB
CHOLESTEROL: 170 MG/DL
HDLC SERPL-MCNC: 70 MG/DL
LDL CHOLESTEROL CALCULATED: 80 MG/DL
TRIGL SERPL-MCNC: 99 MG/DL

## 2022-07-21 PROCEDURE — 36415 COLL VENOUS BLD VENIPUNCTURE: CPT

## 2022-07-21 PROCEDURE — 80061 LIPID PANEL: CPT

## 2022-07-25 ENCOUNTER — OFFICE VISIT (OUTPATIENT)
Dept: INTERNAL MEDICINE CLINIC | Age: 67
End: 2022-07-25
Payer: MEDICARE

## 2022-07-25 VITALS
DIASTOLIC BLOOD PRESSURE: 74 MMHG | WEIGHT: 158 LBS | RESPIRATION RATE: 18 BRPM | OXYGEN SATURATION: 98 % | BODY MASS INDEX: 27.12 KG/M2 | HEART RATE: 69 BPM | SYSTOLIC BLOOD PRESSURE: 120 MMHG

## 2022-07-25 DIAGNOSIS — M81.0 OSTEOPOROSIS, UNSPECIFIED OSTEOPOROSIS TYPE, UNSPECIFIED PATHOLOGICAL FRACTURE PRESENCE: ICD-10-CM

## 2022-07-25 DIAGNOSIS — G62.0 DRUG-INDUCED POLYNEUROPATHY (HCC): ICD-10-CM

## 2022-07-25 DIAGNOSIS — I10 ESSENTIAL HYPERTENSION: Primary | ICD-10-CM

## 2022-07-25 DIAGNOSIS — C90.00 MULTIPLE MYELOMA NOT HAVING ACHIEVED REMISSION (HCC): ICD-10-CM

## 2022-07-25 PROCEDURE — 1123F ACP DISCUSS/DSCN MKR DOCD: CPT | Performed by: INTERNAL MEDICINE

## 2022-07-25 PROCEDURE — 99214 OFFICE O/P EST MOD 30 MIN: CPT | Performed by: INTERNAL MEDICINE

## 2022-07-25 PROCEDURE — 90677 PCV20 VACCINE IM: CPT | Performed by: INTERNAL MEDICINE

## 2022-07-25 PROCEDURE — 90471 IMMUNIZATION ADMIN: CPT | Performed by: INTERNAL MEDICINE

## 2022-07-25 RX ORDER — ALENDRONATE SODIUM 70 MG/1
TABLET ORAL
Qty: 4 TABLET | Refills: 11 | Status: SHIPPED | OUTPATIENT
Start: 2022-07-25

## 2022-07-25 NOTE — PROGRESS NOTES
Tom Serrano  1955 07/25/22    SUBJECTIVE:    Pt no longer follows at [facility] for the myeloma. She continues to follow with Dr Lee Terrell. She continues on Revlemid. She continues to  follow closely with derm. The patient is taking hypertensive medications compliantly without side effects. Denies chest pain, dyspnea, edema, or TIA's. Mood is doing well with the cymbalta. She continues on ibuprofen for her LBP. She gets massage, does stretching. OBJECTIVE:    /74   Pulse 69   Resp 18   Wt 158 lb (71.7 kg)   SpO2 98%   BMI 27.12 kg/m²     Physical Exam  Constitutional:       Appearance: She is well-developed. Eyes:      General: No scleral icterus. Conjunctiva/sclera: Conjunctivae normal.   Neck:      Thyroid: No thyromegaly. Trachea: No tracheal deviation. Cardiovascular:      Rate and Rhythm: Normal rate and regular rhythm. Heart sounds: No murmur heard. No friction rub. No gallop. Pulmonary:      Effort: No respiratory distress. Breath sounds: No wheezing or rales. Abdominal:      General: Bowel sounds are normal. There is no distension. Palpations: Abdomen is soft. There is no hepatomegaly or mass. Tenderness: There is no abdominal tenderness. There is no guarding or rebound. Musculoskeletal:      Cervical back: Neck supple. Lymphadenopathy:      Cervical: No cervical adenopathy. Skin:     Nails: There is no clubbing. Neurological:      Mental Status: She is alert and oriented to person, place, and time. Psychiatric:         Behavior: Behavior normal.         Judgment: Judgment normal.       ASSESSMENT:    1. Essential hypertension    2. Osteoporosis, unspecified osteoporosis type, unspecified pathological fracture presence    3. Multiple myeloma not having achieved remission ([address]    4. Drug-induced polyneuropathy ([address]        PLAN:    Gt Villatoro was seen today for medication refill and 6 month follow-up.     Diagnoses and all orders

## 2022-07-28 ENCOUNTER — OFFICE VISIT (OUTPATIENT)
Dept: ONCOLOGY | Age: 67
End: 2022-07-28
Payer: MEDICARE

## 2022-07-28 VITALS
BODY MASS INDEX: 26.91 KG/M2 | OXYGEN SATURATION: 97 % | TEMPERATURE: 96.8 F | DIASTOLIC BLOOD PRESSURE: 68 MMHG | HEIGHT: 64 IN | WEIGHT: 157.6 LBS | HEART RATE: 54 BPM | SYSTOLIC BLOOD PRESSURE: 151 MMHG

## 2022-07-28 DIAGNOSIS — C90.00 MULTIPLE MYELOMA, REMISSION STATUS UNSPECIFIED (HCC): Primary | ICD-10-CM

## 2022-07-28 PROCEDURE — 99214 OFFICE O/P EST MOD 30 MIN: CPT | Performed by: INTERNAL MEDICINE

## 2022-07-28 PROCEDURE — 1123F ACP DISCUSS/DSCN MKR DOCD: CPT | Performed by: INTERNAL MEDICINE

## 2022-07-28 NOTE — PROGRESS NOTES
MA Rooming Questions  Patient: Frankey Lutes  MRN: [id number]    Date: 7/28/2022        1. Do you have any new issues?   no         2. Do you need any refills on medications?    no    3. Have you had any imaging done since your last visit?   no    4. Have you been hospitalized or seen in the emergency room since your last visit here?   no    5. Did the patient have a depression screening completed today?  No    No data recorded     PHQ-9 Given to (if applicable):               PHQ-9 Score (if applicable):                     [] Positive     []  Negative              Does question #9 need addressed (if applicable)                     [] Yes    []  No               Evan Gunn CMA

## 2022-07-28 NOTE — PROGRESS NOTES
Patient Name: Franc Morrow  Patient : 1955  Patient MRN: [id number]     Primary Oncologist: Kinza Mejias MD  Referring Provider: Mckenna Kimball MD     Date of Service: 2022      Chief Complaint:   Chief Complaint   Patient presents with    Follow-up       Patient Active Problem List:     Multiple myeloma     HPI:  Niurka Darden is a 25-year-old very pleasant female with medical history significant for hypertension, anxiety disorder, and anemia since 2015, initially referred to me for evaluation for her normocytic normochromic anemia. She stated that she has been having right lateral chest wall pain starting May 2015. She had chest x-ray done in 2015 and it showed left sixth rib fracture. Her pain has been controlled with ibuprofen since then. She was found to have anemia (hemoglobin 9.3) on nd she was started with oral iron supplementation. Her anemia got worse on repeat blood test done on 2015, (hemoglobin 8.1). She had chest x-ray and ultrasound of the abdomen on 2015, and they were normal.      She had a colonoscopy by Dr. Sophia Fuentes about three to four years ago which was normal according to her. She also had history of melanoma twice in the past (left lower extremity melanoma which was excised in  and right shoulder melanoma which was excised in ). She was also found to have elevated total protein by primary care physician. Because of her normocytic normochromic anemia associated with elevated total protein, she was subsequently referred to me for evaluation of possible plasma cell dyscrasia. She denies fever, night sweats, loss of appetite, and weight loss. She does not have any other significant symptoms except right lower chest wall pain.       Laboratory workups done on 2015, showed persistent normocytic normochromic anemia (hemoglobin 8.2 and MCV 89), persisted elevated total protein (total protein 14.1), normal calcium (calcium 9.9), slight elevation in serum creatinine (creatinine 1.2), elevated Beta-2 microglobulin (6.5), and elevated ESR (more than 120). Serum protein electrophoresis and immunofixation was sent on September 14, 2015, and it showed monoclonal IgG Kappa protein of 6.8 gram/dL. Serum Kappa light chain was 16.9 and serum Lambda light chain was less than 0.16. I did bone marrow biopsy on September 19, 2015, and the bone marrow aspirate flow cytometry showed 38 percent plasma cell population consistent with plasma cell neoplasm. Bone marrow biopsy  stain was about 98 percent positive for  positive plasma cell. 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 Gain of 1q has been associated with advanced disease and it is considered a high risk indicator. Cytogenetic studies showed normal female karyotype (46XX). Ms. Colby Machuca had a PET-CT scan on October 1, 2015, and it showed no metabolically active lytic lesions. Focal skin thickening in the mid medial right cuff with associated metabolic activity. This is non-specific and can be seen with infectious or inflammatory process; however, given the history of melanoma, recommend correlation with direct visualization. Ms. Colby Machuca was started with chemotherapy (bortezomib, cyclophosphamide, and dexamethasone) on October 2, 2015. Ms. Colby Machuca was seen by Dr. Catherine Tillman at [facility] for possible autologous stem cell transplantation. Dr. Catherine Tillman and Ms. Serrano decided to have stem cell collection for future use. Stem cell was collected on the first week of March 2016. Our plan was to continue with current chemotherapy followed by maintenance chemotherapy.  Dr. Catherine Tillman will consider autologous stem cell transplantation when her disease becomes refractory or progressed. Ms. Alexus Cotter completed her eight cycles of chemotherapy with cyclophosphamide, bortezomib, and dexamethasone on July 8, 2016. Maintenance chemotherapy with Velcade and dexamethasone was started on August 2, 2016 and she completed it on June 12, 2018. I recognized that her monoclonal protein went up to 1100 mg/dL on recent blood test done on July 18, 2018. She was subsequently evaluated by Dr. Trell Angela and she believes it is due to stopping velcade. She recommend to resume maintenance chemotherapy. She prefers Nilaro maintenance over velcade because of ease of administration. Maintenance chemotherapy with Jodeen Bachelor was started on August 20, 2018 and we stopped it on November 27, 2018 since she has biochemical progression of the disease. We decided to change it to maintenance chemotherapy with Revlimid. Revlimid 10 mg daily was started on December 6, 2018. On July 28, 2022, she presented to me for followup. I have been following Ms. Serrano for IgG Kappa multiple myeloma and she is currently on maintenance chemotherapy with Revlimid (10 mg daily) since December 6, 2018. She is tolerating revlimid very well and she doesn't encounter any major side effects from it. I recognize that she has probable faint M protein on 7/20/22 blood test done at [facility]. It was faint IgG kappa only on 4/13/22, faint IgG kappa only on 1/18/22, too small to measure on 10/15/21, 200 mg/dl on 7/22/21, 300 mg/dl on 4/22/21, 200mg/dl on 1/22/21, 200 mg/dl on October 23, 2020, very small to measure on [facility] on 7/20/20, 200 mg/dl on 4/22/2020, 200 mg/dl on 1/21/20, 430 mg/dl on 10/23/19, 200 mg on 7/23/19,  400mg on 4/23/19, 1000mg on  1/22/19 and 2100 mg/dL on blood test done on November 20, 2018. I believe she is responding very well to Revlimid and I recommend her to continue with that for now.  I will see her again in three months and I will repeat all the blood tests again a week before next appointment. Chemo induced diarrhea - mild symptom only. Recommend to take imodium prn. She has mild neuropathy in her right upper extremity, but it is less likely to be related to revlimid. Will continue to monitor it for now. Skin lesions - s/p excision by dermatology. Path still pending. To continue with niacinamide. Osteoporosis - her primary care physician started fosamax. I recommend her to continue with that for now. Chemo induced neutropenia -  Stable and will continue to monitor it for now. COVID vaccine - She already received her COVID19 vaccine. VTE prophylaxis - I recommend her to continue with aspirin 81 mg daily. Hypertension - She is on atenolol. SBP is normal today. I recommend salt restriction. Chemo induced neuropathy - stable. To continue with cymbalta 60 mg daily. Health maintenance - I recommend her to have age-appropriate cancer screening, exercise, low-fat and low-sodium diet. Besides that, she does not have any other significant symptoms on  today's visit. PAST MEDICAL HISTORY:  Significant for:  1. Hypertension. 2.         Melanoma. 3.         Anxiety disorder. PAST SURGICAL HISTORY:  Significant for:  1. Total abdominal hysterectomy in 1999.  2.         Left lower extremity excision and biopsy for melanoma in 2006. 3.         Excision and biopsy of right shoulder melanoma in 2010. FAMILY HISTORY:  Significant for breast cancer in her mother. No other family history of cancer disease. SOCIAL HISTORY:  She denies smoking and illicit drug abuse. She socially drinks alcohol. She is  and she is currently living in [facility]. ALLERGIES:  No known drug allergies. Oncology History    No history exists. Review of Systems: \"Per interval history; otherwise 10 point ROS is negative. \"  Her energy level is good and her sleep is fine.  She denies fever, chills, night sweats, cough, shortness of breath, chest pain, hemoptysis or palpitations. Her bowel and bladder functions are normal, except loose stool off and on. She doesn't have nausea, vomiting, abdominal pain, constipation, dysuria, loss of appetite or weight loss. She has mild stable neuropathy in her right upper extremity. She denies bleeding or clotting issues. She doesn't have any pain in her body. Has mild anxiety. Denies depression. The rest of the systems are unremarkable. Vital Signs:  BP (!) 151/68 (Site: Right Upper Arm, Position: Sitting, Cuff Size: Medium Adult)   Pulse 54   Temp 96.8 °F (36 °C) (Infrared)   Ht 5' 4\" (1.626 m)   Wt 157 lb 9.6 oz (71.5 kg)   SpO2 97%   BMI 27.05 kg/m²     Physical Exam:  CONSTITUTIONAL: awake, alert, cooperative, no apparent distress   EYES: pupils equal, round and reactive to light, sclera clear and conjunctiva normal  ENT: Normocephalic, without obvious abnormality, atraumatic  NECK: supple, symmetrical, no jugular venous distension and no carotid bruits   HEMATOLOGIC/LYMPHATIC: no cervical, supraclavicular or axillary lymphadenopathy   LUNGS: VBS, no wheezes, no increased work of breathing, no rhonchi, clear to auscultation, no crackles,    CARDIOVASCULAR: regular rate and rhythm, normal S1 and S2, no murmur noted  ABDOMEN: soft, non-distended, non-tender, normal bowel sounds x 4, no masses palpated, no hepatosplenomegaly   MUSCULOSKELETAL: full range of motion noted, tone is normal  NEUROLOGIC: awake, alert, oriented to name, place and time. Motor skills grossly intact. SKIN: Normal skin color, texture, turgor and no jaundice.  appears intact   EXTREMITIES: no clubbing, no LE edema, no cyanosis, no leg swelling,      Labs:  Hematology:  Lab Results   Component Value Date    WBC 1.7 (LL) 04/13/2022    RBC 3.98 (L) 04/13/2022    HGB 12.7 04/13/2022    HCT 36.9 (L) 04/13/2022    MCV 92.7 04/13/2022    MCH 31.9 (H) 04/13/2022    MCHC 34.4 04/13/2022    RDW 15.0 (H) 04/13/2022     04/13/2022    MPV 11.2 (H) 04/13/2022    BANDSPCT 4 (L) 09/17/2015    SEGSPCT 50.0 04/13/2022    EOSRELPCT 5.2 (H) 04/13/2022    BASOPCT 1.1 (H) 04/13/2022    LYMPHOPCT 21.3 (L) 04/13/2022    MONOPCT 22.4 (H) 04/13/2022    BANDABS 0.17 09/17/2015    SEGSABS 0.9 04/13/2022    EOSABS 0.1 04/13/2022    BASOSABS 0.0 04/13/2022    LYMPHSABS 0.4 04/13/2022    MONOSABS 0.4 04/13/2022    DIFFTYPE AUTOMATED DIFFERENTIAL 04/13/2022    WBCMORP RARE 09/17/2015     Lab Results   Component Value Date    ESR 1 04/13/2022     Chemistry:  Lab Results   Component Value Date     04/13/2022    K 3.9 04/13/2022     04/13/2022    CO2 25 04/13/2022    BUN 16 04/13/2022    CREATININE 0.9 04/13/2022    GLUCOSE 99 04/13/2022    CALCIUM 8.7 04/13/2022    PROT 6.4 04/13/2022    PROT 6.4 04/13/2022    LABALBU 3.60 (L) 04/13/2022    LABALBU 4.1 04/13/2022    BILITOT 0.7 04/13/2022    ALKPHOS 61 04/13/2022    AST 22 04/13/2022    ALT 22 04/13/2022    LABGLOM >60 04/13/2022    GFRAA >60 04/13/2022    AGRATIO 0.3 (L) 09/09/2015    GLOB 10.1 09/09/2015    PHOS 3.8 10/09/2015    MG 2.0 10/09/2015     Lab Results   Component Value Date     04/13/2022     No components found for: LD  Lab Results   Component Value Date    TSHHS 2.120 07/05/2018     Immunology:  Lab Results   Component Value Date    PROT 6.4 04/13/2022    PROT 6.4 04/13/2022    SPEP  01/18/2022     INTERPRETATION - Faint possible IgG kappa band noted. Shawna Hampton md    SPEP  01/18/2022     INTERPRETATION - Within normal limits. Please correlate wtih correpsonding BRENT which shows faint possible IgG kappa band. Previous history of monoclonal gammopathy noted.   Shawna Hampton MD    ALBUMINELP 3.8 01/18/2022    LABALPH 0.31 04/13/2022    LABALPH 0.62 04/13/2022    LABBETA 0.77 04/13/2022    GAMGLOB 1.1 01/18/2022     Lab Results   Component Value Date    KAPPAUVOL 29.48 (H) 04/13/2022    LAMBDAUVOL 24.98 10/23/2020    KLFLCR 1.31 04/13/2022     Lab Results   Component Value Date    B2M 1.9 04/13/2022     Coagulation Panel:  No results found for: PROTIME, INR, APTT, DDIMER  Anemia Panel:  No results found for: SDHLGLXU43, FOLATE  Tumor Markers:  No results found for: , CEA, , LABCA2, PSA  Observations:  No data recorded        Assessment & Plan:   1. Symptomatic IgG Kappa multiple myeloma. 2.         Normocytic normochromic anemia - due to multiple myeloma. PLAN:  Ms. Kadie Velazco has been followed for IgG Kappa multiple myeloma. On July 28, 2022, she presented to me for followup. I have been following Ms. Serrano for IgG Kappa multiple myeloma and she is currently on maintenance chemotherapy with Revlimid (10 mg daily) since December 6, 2018. She is tolerating revlimid very well and she doesn't encounter any major side effects from it. I recognize that she has probable faint M protein on 7/20/22 blood test done at [facility]. It was faint IgG kappa only on 4/13/22, faint IgG kappa only on 1/18/22, too small to measure on 10/15/21, 200 mg/dl on 7/22/21, 300 mg/dl on 4/22/21, 200mg/dl on 1/22/21, 200 mg/dl on October 23, 2020, very small to measure on [facility] on 7/20/20, 200 mg/dl on 4/22/2020, 200 mg/dl on 1/21/20, 430 mg/dl on 10/23/19, 200 mg on 7/23/19,  400mg on 4/23/19, 1000mg on  1/22/19 and 2100 mg/dL on blood test done on November 20, 2018. I believe she is responding very well to Revlimid and I recommend her to continue with that for now. I will see her again in three months and I will repeat all the blood tests again a week before next appointment. Chemo induced diarrhea - mild symptom only. Recommend to take imodium prn. She has mild neuropathy in her right upper extremity, but it is less likely to be related to revlimid. Will continue to monitor it for now. Skin lesions - s/p excision by dermatology. Path still pending. To continue with niacinamide. Osteoporosis - her primary care physician started fosamax. I recommend her to continue with that for now.

## 2022-08-04 DIAGNOSIS — C90.00 MULTIPLE MYELOMA, REMISSION STATUS UNSPECIFIED (HCC): ICD-10-CM

## 2022-08-04 RX ORDER — LENALIDOMIDE 10 MG/1
10 CAPSULE ORAL DAILY
Qty: 28 CAPSULE | Refills: 0 | Status: ACTIVE | OUTPATIENT
Start: 2022-08-04 | End: 2022-09-01 | Stop reason: SDUPTHER

## 2022-08-04 NOTE — PROGRESS NOTES
Revlimid 10 mg chemo capsules reordered and sent to [facility]. Maureen Camarenade # [id number] obtained through [employer] risk management/[employer] portal. Auth valid for 30 days.

## 2022-08-18 ENCOUNTER — APPOINTMENT (OUTPATIENT)
Dept: GENERAL RADIOLOGY | Age: 67
End: 2022-08-18
Payer: MEDICARE

## 2022-08-18 ENCOUNTER — HOSPITAL ENCOUNTER (EMERGENCY)
Age: 67
Discharge: HOME | End: 2022-08-18
Payer: MEDICARE

## 2022-08-18 VITALS
DIASTOLIC BLOOD PRESSURE: 58 MMHG | SYSTOLIC BLOOD PRESSURE: 131 MMHG | TEMPERATURE: 98.4 F | HEART RATE: 71 BPM | RESPIRATION RATE: 16 BRPM | BODY MASS INDEX: 26.8 KG/M2 | HEIGHT: 64 IN | WEIGHT: 157 LBS | OXYGEN SATURATION: 96 %

## 2022-08-18 DIAGNOSIS — R07.89 CHEST WALL PAIN: Primary | ICD-10-CM

## 2022-08-18 PROCEDURE — 71046 X-RAY EXAM CHEST 2 VIEWS: CPT

## 2022-08-18 PROCEDURE — 99284 EMERGENCY DEPT VISIT MOD MDM: CPT

## 2022-08-18 PROCEDURE — 93005 ELECTROCARDIOGRAM TRACING: CPT | Performed by: EMERGENCY MEDICINE

## 2022-08-18 ASSESSMENT — PAIN SCALES - GENERAL: PAINLEVEL_OUTOF10: 9

## 2022-08-18 ASSESSMENT — PAIN DESCRIPTION - PAIN TYPE: TYPE: ACUTE PAIN

## 2022-08-18 ASSESSMENT — PAIN DESCRIPTION - LOCATION: LOCATION: RIB CAGE

## 2022-08-18 ASSESSMENT — PAIN DESCRIPTION - ORIENTATION: ORIENTATION: LEFT

## 2022-08-18 ASSESSMENT — PAIN DESCRIPTION - DESCRIPTORS: DESCRIPTORS: SHARP;STABBING

## 2022-08-18 NOTE — ED PROVIDER NOTES
12 lead EKG per my interpretation:  Sinus bradycardia at 56  Axis is   Left axis deviation  QTc is  within an acceptable range  There is no specific T wave changes appreciated. There is no specific ST wave changes appreciated. LVH  No STEMI    Prior EKG to compare with was available and no clinically significant change no morphology compared to prior from 8/25/2015.     MD Luzma Anthony MD  08/18/22 [id number]

## 2022-08-18 NOTE — ED PROVIDER NOTES
EMERGENCY DEPARTMENT ENCOUNTER        PCP: Volodymyr Childs MD    [address]    Chief Complaint   Patient presents with    Rib Pain     NKI, Started Tuesday       This patient was not evaluated by the attending physician. I have independently evaluated this patient. HPI    Jaqueline Snider is a 79 y.o. female who presents with left-sided chest/rib pain. Onset 2 days ago. Patient states she was moving a lot of objects 5 days ago, denies any falling type injury but believes she may have \"overdone it\". Patient states pain worsens with deep inspiration, denies any other shortness of breath. Patient denies cough, fever, abdominal pain, vomiting or diarrhea. Patient denies any urinary symptoms. Patient denies history of blood clots, recent travel or surgery. Patient has history of hypertension. Patient denies hyperlipidemia, diabetes, coronary artery disease. Patient denies family history of heart disease. Patient has history of multiple myeloma. Pain worsens with direct palpation and certain movements. REVIEW OF SYSTEMS    Constitutional:  Denies fever  HENT:  Denies ear pain   Cardiovascular:  See HPI. Respiratory:    See HPI.   Denies shortness of breath, or hemoptysis    GI:  Denies abdominal pain, nausea, vomiting, or diarrhea  :  Denies any urinary symptoms   Musculoskeletal:  Denies lower extremity pain or swelling  Skin:  Denies rash  Neurologic:  Denies focal weakness or sensory changes   Lymphatic:  Denies swollen glands     All other review of systems are negative  See HPI and nursing notes for additional information     PAST MEDICAL & SURGICAL HISTORY    Past Medical History:   Diagnosis Date    Colon polyps     HTN (hypertension)     HTN (hypertension) 9/8/2015    Melanoma ([facility] [address] 2010, 2006    Multiple myeloma ([facility] [address] 9/29/2015    Multiple myeloma ([facility] [address] 9/29/14     Past Surgical History:   Procedure Laterality Date    COLONOSCOPY      HYSTERECTOMY, TOTAL ABDOMINAL (CERVIX REMOVED)  1999    fibroids    SKIN CANCER EXCISION  2010, 2006 2010-Right shoulder melanoma; 2006-Left lower Extremity melanoma    TONSILLECTOMY Bilateral 1975       CURRENT MEDICATIONS    Current Outpatient Rx   Medication Sig Dispense Refill    lenalidomide (REVLIMID) 10 MG chemo capsule Take 1 capsule by mouth daily 28 capsule 0    alendronate (FOSAMAX) 70 MG tablet TAKE ONE TABLET BY MOUTH ONCE WEEKLY 4 tablet 11    atenolol (TENORMIN) 50 MG tablet TAKE ONE TABLET BY MOUTH DAILY 90 tablet 0    DULoxetine (CYMBALTA) 60 MG extended release capsule TAKE ONE CAPSULE BY MOUTH ONCE A DAY 30 capsule 11    amLODIPine (NORVASC) 2.5 MG tablet Take 1 tablet by mouth daily 90 tablet 3    niacinamide 500 MG tablet Take 500 mg by mouth 2 times daily (with meals)      aspirin 81 MG chewable tablet Take 81 mg by mouth daily      fluoruracil (CARAC) 0.5 % cream Apply 40 g topically daily Apply topically daily. ibuprofen (ADVIL;MOTRIN) 400 MG tablet Take 1 tablet by mouth every 6 hours as needed for Pain 120 tablet 3    Multiple Vitamin (MULTIVITAMIN) capsule Take by mouth      Omega-3 Fatty Acids (FISH OIL) 1000 MG CAPS Take by mouth         ALLERGIES    Allergies   Allergen Reactions    Valacyclovir Rash     Stopped at same time as allopurinol. Unsure which drug caused reaction. Stopped at same time as allopurinol. Unsure which drug caused reaction. SOCIAL & FAMILY HISTORY    Social History     Socioeconomic History    Marital status:      Spouse name: None    Number of children: None    Years of education: None    Highest education level: None   Occupational History    Occupation: retired      Comment: lives at home with spouse. 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   Tobacco Use    Smoking status: Never    Smokeless tobacco: Never   Vaping Use    Vaping Use: Never used   Substance and Sexual Activity    Alcohol use:  Yes     Alcohol/week: 7.0 standard drinks     Types: 7 Cans of beer per week    Drug use: No    Sexual activity: Yes     Partners: Male   Social History Narrative    Exercise - walking daily 7-8K steps daily     Family History   Problem Relation Age of Onset    Cancer Mother 76        breast    High Blood Pressure Mother     High Cholesterol Mother     Breast Cancer Mother 76    High Blood Pressure Father     Heart Failure Father     High Blood Pressure Sister     High Blood Pressure Brother     Ovarian Cancer Neg Hx        PHYSICAL EXAM    VITAL SIGNS: BP (!) 131/58   Pulse 71   Temp 98.4 °F (36.9 °C) (Oral)   Resp 16   Ht 5' 4\" (1.626 m)   Wt 157 lb (71.2 kg)   SpO2 96%   BMI 26.95 kg/m²    Constitutional:  Well developed, well nourished, no acute distress   HENT:  Atraumatic, moist mucus membranes  Neck/Lymphatics: supple, no JVD, no swollen nodes  Respiratory:  Lungs Clear, no retractions   Cardiovascular: Bradycardic, normal rhythm. Chest wall with no overlying erythema, ecchymosis or swelling. Mild tenderness to left lateral chest wall without palpable defect or crepitus. Vascular: Radial pulses 2+ equal bilaterally  GI:  Soft, nontender, normal bowel sounds  Musculoskeletal:  No edema, no deformities  Integument:  Skin warm and dry, no petechiae   Neurologic:  Alert & oriented, normal speech  Psych: Pleasant affect, no hallucinations      EKG Interpretation  Please see ED physician's note for EKG interpretation      RADIOLOGY/PROCEDURES    XR CHEST (2 VW)   Final Result   Left basilar opacity could represent subsegmental atelectasis or infection                 ED COURSE & MEDICAL DECISION MAKING    Patient presents as above. See physician note for EKG reading. Chest x-ray shows left basilar opacity could represent subsegmental atelectasis or infection. Patient denies cough or fever and I suspect this is atelectasis. I do recommend lab work, CTA pulmonary for further evaluation.   Patient states she has had recent lab work and does not want to have lab work or further imaging done at this time. I discussed with patient that I cannot rule out cardiac etiology or PE without additional work-up. Patient understands this and still like to hold off on additional work-up. I recommend ibuprofen and Tylenol as needed. Recommend taking deep breaths multiple times a day and gentle range of motion stretching. I recommend follow-up with primary care provider in 2 days for recheck. Clinical  IMPRESSION    1. Chest wall pain          Diagnosis and plan discussed in detail with patient who understands and agrees. Patient agrees to return emergency department if symptoms worsen or any new symptoms develop. Comment: Please note this report has been produced using speech recognition software and may contain errors related to that system including errors in grammar, punctuation, and spelling, as well as words and phrases that may be inappropriate. If there are any questions or concerns please feel free to contact the dictating provider for clarification.         Frederick Anderson PA-C  08/18/22 [id number]

## 2022-08-19 ENCOUNTER — OFFICE VISIT (OUTPATIENT)
Dept: INTERNAL MEDICINE CLINIC | Age: 67
End: 2022-08-19
Payer: MEDICARE

## 2022-08-19 ENCOUNTER — HOSPITAL ENCOUNTER (OUTPATIENT)
Dept: CT IMAGING | Age: 67
Discharge: HOME | End: 2022-08-19
Payer: MEDICARE

## 2022-08-19 VITALS
OXYGEN SATURATION: 98 % | RESPIRATION RATE: 14 BRPM | DIASTOLIC BLOOD PRESSURE: 70 MMHG | SYSTOLIC BLOOD PRESSURE: 130 MMHG | HEART RATE: 55 BPM

## 2022-08-19 DIAGNOSIS — R07.81 PLEURITIC CHEST PAIN: Primary | ICD-10-CM

## 2022-08-19 DIAGNOSIS — R07.81 PLEURITIC CHEST PAIN: ICD-10-CM

## 2022-08-19 LAB
EKG ATRIAL RATE: 56 BPM
EKG DIAGNOSIS: NORMAL
EKG P AXIS: 41 DEGREES
EKG P-R INTERVAL: 164 MS
EKG Q-T INTERVAL: 426 MS
EKG QRS DURATION: 94 MS
EKG QTC CALCULATION (BAZETT): 411 MS
EKG R AXIS: -7 DEGREES
EKG T AXIS: 72 DEGREES
EKG VENTRICULAR RATE: 56 BPM

## 2022-08-19 PROCEDURE — 71275 CT ANGIOGRAPHY CHEST: CPT

## 2022-08-19 PROCEDURE — 93010 ELECTROCARDIOGRAM REPORT: CPT | Performed by: INTERNAL MEDICINE

## 2022-08-19 PROCEDURE — 99214 OFFICE O/P EST MOD 30 MIN: CPT | Performed by: INTERNAL MEDICINE

## 2022-08-19 PROCEDURE — 6360000004 HC RX CONTRAST MEDICATION: Performed by: INTERNAL MEDICINE

## 2022-08-19 PROCEDURE — 1123F ACP DISCUSS/DSCN MKR DOCD: CPT | Performed by: INTERNAL MEDICINE

## 2022-08-19 RX ADMIN — IOPAMIDOL 100 ML: 755 INJECTION, SOLUTION INTRAVENOUS at 13:00

## 2022-08-19 SDOH — ECONOMIC STABILITY: FOOD INSECURITY: WITHIN THE PAST 12 MONTHS, THE FOOD YOU BOUGHT JUST DIDN'T LAST AND YOU DIDN'T HAVE MONEY TO GET MORE.: NEVER TRUE

## 2022-08-19 SDOH — ECONOMIC STABILITY: FOOD INSECURITY: WITHIN THE PAST 12 MONTHS, YOU WORRIED THAT YOUR FOOD WOULD RUN OUT BEFORE YOU GOT MONEY TO BUY MORE.: NEVER TRUE

## 2022-08-19 ASSESSMENT — SOCIAL DETERMINANTS OF HEALTH (SDOH): HOW HARD IS IT FOR YOU TO PAY FOR THE VERY BASICS LIKE FOOD, HOUSING, MEDICAL CARE, AND HEATING?: NOT HARD AT ALL

## 2022-08-19 NOTE — PROGRESS NOTES
Johana Serrano  1955 08/19/22    SUBJECTIVE:    Saturday was very busy with lifting, stretching. Sunday night pt developed a muscle spasm on the left thigh. This resolved. Tuesday pt developed pain in the left side of the chest abd left shoulder. She was unable to find a comfortable position. She has pain with deep inspiration. She denies SOB, edema. She has used ibuprofen with some benefit. OBJECTIVE:    /70   Pulse 55   Resp 14   SpO2 98%     Physical Exam  Constitutional:       Appearance: She is well-developed. Eyes:      General: No scleral icterus. Conjunctiva/sclera: Conjunctivae normal.   Cardiovascular:      Rate and Rhythm: Normal rate and regular rhythm. Heart sounds: Normal heart sounds. No murmur heard. Pulmonary:      Effort: Pulmonary effort is normal. No respiratory distress. Breath sounds: Normal breath sounds. No wheezing. No cords, edema  ASSESSMENT:    1. Pleuritic chest pain        PLAN:    Bobby Elise was seen today for follow-up from hospital.    Diagnoses and all orders for this visit:    Pleuritic chest pain - reviewed ER eval. I am concerned about a PE, and the leg pain could have been a DVT. I will send for STAT CT, if (+) will start eliquis. Revlimid may be underlying cause, but this has been so effective I would argue for continuing med, but will defer to oncologist.   -     CTA PULMONARY WITH CONTRAST;  Future

## 2022-08-21 DIAGNOSIS — I10 ESSENTIAL HYPERTENSION: ICD-10-CM

## 2022-08-22 ENCOUNTER — TELEPHONE (OUTPATIENT)
Dept: ONCOLOGY | Age: 67
End: 2022-08-22

## 2022-08-22 RX ORDER — ATENOLOL 50 MG/1
TABLET ORAL
Qty: 90 TABLET | Refills: 0 | Status: SHIPPED | OUTPATIENT
Start: 2022-08-22

## 2022-08-22 NOTE — TELEPHONE ENCOUNTER
Patient called to let you know that she was dx w/blood clot in her lt lung on Fri. 08/19 and was started on Eliquis

## 2022-08-23 NOTE — TELEPHONE ENCOUNTER
Tried calling patient to advise her that Dr Roslyn Darnell advised her to continue aspirin w/the Eliquis if possible, had to leave a VM w/info

## 2022-08-26 ENCOUNTER — OFFICE VISIT (OUTPATIENT)
Dept: INTERNAL MEDICINE CLINIC | Age: 67
End: 2022-08-26
Payer: MEDICARE

## 2022-08-26 VITALS
RESPIRATION RATE: 12 BRPM | HEART RATE: 56 BPM | OXYGEN SATURATION: 98 % | SYSTOLIC BLOOD PRESSURE: 128 MMHG | DIASTOLIC BLOOD PRESSURE: 64 MMHG

## 2022-08-26 DIAGNOSIS — I26.93 SINGLE SUBSEGMENTAL PULMONARY EMBOLISM WITHOUT ACUTE COR PULMONALE (HCC): Primary | ICD-10-CM

## 2022-08-26 PROCEDURE — 99213 OFFICE O/P EST LOW 20 MIN: CPT | Performed by: INTERNAL MEDICINE

## 2022-08-26 PROCEDURE — 1123F ACP DISCUSS/DSCN MKR DOCD: CPT | Performed by: INTERNAL MEDICINE

## 2022-08-26 RX ORDER — ASPIRIN 81 MG/1
81 TABLET ORAL DAILY
Qty: 90 TABLET | Refills: 1 | COMMUNITY
Start: 2022-08-26

## 2022-08-26 NOTE — PROGRESS NOTES
Gabriela Carrel Everhart  1955 08/26/22    SUBJECTIVE:    \"I feel much better. .. I don't have the rib pain. \" She is breathing comfortable. She denies leg pain, swelling. She denies blood in the stool, black stools. OBJECTIVE:    /64   Pulse 56   Resp 12   SpO2 98%     Physical Exam    ASSESSMENT:    1. Single subsegmental pulmonary embolism without acute cor pulmonale (HCC)        PLAN:    David Fagan was seen today for other. Diagnoses and all orders for this visit:    Single subsegmental pulmonary embolism without acute cor pulmonale (HCC) - symptoms improved; will need indefinite anticoagulation. Warned of complication from meds    Other orders  -     aspirin EC 81 MG EC tablet; Take 1 tablet by mouth daily  -     apixaban (ELIQUIS) 5 MG TABS tablet;  Take 1 tablet by mouth 2 times daily no

## 2022-09-01 DIAGNOSIS — C90.00 MULTIPLE MYELOMA, REMISSION STATUS UNSPECIFIED (HCC): ICD-10-CM

## 2022-09-01 RX ORDER — LENALIDOMIDE 10 MG/1
10 CAPSULE ORAL DAILY
Qty: 28 CAPSULE | Refills: 0 | Status: ACTIVE | OUTPATIENT
Start: 2022-09-01 | End: 2022-10-04 | Stop reason: SDUPTHER

## 2022-09-01 NOTE — PROGRESS NOTES
Revlimid 10 mg chemo capsules #28 reordered and e-scribed to [facility] pharmacy. Elise Hough # [id number] obtained through [employer] risk management/[employer] portal. Auth valid for 30 days.

## 2022-10-04 ENCOUNTER — CLINICAL DOCUMENTATION (OUTPATIENT)
Dept: ONCOLOGY | Age: 67
End: 2022-10-04

## 2022-10-04 DIAGNOSIS — C90.00 MULTIPLE MYELOMA, REMISSION STATUS UNSPECIFIED (HCC): ICD-10-CM

## 2022-10-04 RX ORDER — LENALIDOMIDE 10 MG/1
10 CAPSULE ORAL DAILY
Qty: 28 CAPSULE | Refills: 0 | Status: ACTIVE | OUTPATIENT
Start: 2022-10-04 | End: 2022-10-31 | Stop reason: SDUPTHER

## 2022-10-04 NOTE — PROGRESS NOTES
Revlimid 10 mg chemo capsules #28 reordered and e-scribed to [facility]. Rashad Childress # [id number] obtained through [employer] management/[employer] portal. Auth valid for 30 days.

## 2022-10-04 NOTE — PROGRESS NOTES
[facility]    Date: 10/04/22    Medication is currently being filled at [facility] and has been routed there. Please call us with any questions at [phone] opt.  2.

## 2022-10-11 ENCOUNTER — TELEPHONE (OUTPATIENT)
Dept: ONCOLOGY | Age: 67
End: 2022-10-11

## 2022-10-11 DIAGNOSIS — R06.02 SHORTNESS OF BREATH: Primary | ICD-10-CM

## 2022-10-11 DIAGNOSIS — Z86.711 HISTORY OF PULMONARY EMBOLISM: ICD-10-CM

## 2022-10-11 NOTE — TELEPHONE ENCOUNTER
Patient states she has been having some increased shortness of breath within the last week. She has a history of blood in lung. Please advise.

## 2022-10-11 NOTE — TELEPHONE ENCOUNTER
This nurse called the patient @ [phone] to discuss her concerns. She reports increased SOB over the last week, denies any swelling to her feet or ankles. Patient does endorse worsening of her SOB with exertion especially when going up stairs. This nurse spoke with Dr Bri Singletary and he requested a CTA. This nurse called and informed the patient that she would receive a call to schedule her CT.

## 2022-10-20 ENCOUNTER — HOSPITAL ENCOUNTER (OUTPATIENT)
Dept: CT IMAGING | Age: 67
Discharge: HOME | End: 2022-10-20
Payer: MEDICARE

## 2022-10-20 DIAGNOSIS — Z86.711 HISTORY OF PULMONARY EMBOLISM: ICD-10-CM

## 2022-10-20 DIAGNOSIS — R06.02 SHORTNESS OF BREATH: ICD-10-CM

## 2022-10-20 LAB
EGFR, POC: >60 ML/MIN/1.73M2
POC CREATININE: 1 MG/DL (ref 0.6–1.1)

## 2022-10-20 PROCEDURE — 2580000003 HC RX 258: Performed by: INTERNAL MEDICINE

## 2022-10-20 PROCEDURE — 71275 CT ANGIOGRAPHY CHEST: CPT

## 2022-10-20 PROCEDURE — 6360000004 HC RX CONTRAST MEDICATION: Performed by: INTERNAL MEDICINE

## 2022-10-20 RX ORDER — SODIUM CHLORIDE 0.9 % (FLUSH) 0.9 %
5-40 SYRINGE (ML) INJECTION PRN
Status: DISCONTINUED | OUTPATIENT
Start: 2022-10-20 | End: 2022-10-21 | Stop reason: HOSPADM

## 2022-10-20 RX ADMIN — IOPAMIDOL 95 ML: 755 INJECTION, SOLUTION INTRAVENOUS at 10:15

## 2022-10-20 RX ADMIN — SODIUM CHLORIDE, PRESERVATIVE FREE 10 ML: 5 INJECTION INTRAVENOUS at 10:15

## 2022-10-21 ENCOUNTER — HOSPITAL ENCOUNTER (OUTPATIENT)
Dept: INFUSION THERAPY | Age: 67
Discharge: HOME | End: 2022-10-21
Payer: MEDICARE

## 2022-10-21 DIAGNOSIS — C90.00 MULTIPLE MYELOMA, REMISSION STATUS UNSPECIFIED (HCC): ICD-10-CM

## 2022-10-21 LAB
ALBUMIN SERPL-MCNC: 4 GM/DL (ref 3.4–5)
ALP BLD-CCNC: 59 IU/L (ref 40–129)
ALT SERPL-CCNC: 33 U/L (ref 10–40)
ANION GAP SERPL CALCULATED.3IONS-SCNC: 8 MMOL/L (ref 4–16)
AST SERPL-CCNC: 25 IU/L (ref 15–37)
BASOPHILS ABSOLUTE: 0 K/CU MM
BASOPHILS RELATIVE PERCENT: 0.6 % (ref 0–1)
BILIRUB SERPL-MCNC: 0.5 MG/DL (ref 0–1)
BUN BLDV-MCNC: 15 MG/DL (ref 6–23)
CALCIUM SERPL-MCNC: 9.2 MG/DL (ref 8.3–10.6)
CHLORIDE BLD-SCNC: 102 MMOL/L (ref 99–110)
CO2: 28 MMOL/L (ref 21–32)
CREAT SERPL-MCNC: 1 MG/DL (ref 0.6–1.1)
DIFFERENTIAL TYPE: ABNORMAL
EOSINOPHILS ABSOLUTE: 0.1 K/CU MM
EOSINOPHILS RELATIVE PERCENT: 7.4 % (ref 0–3)
ERYTHROCYTE SEDIMENTATION RATE: 7 MM/HR (ref 0–30)
GFR SERPL CREATININE-BSD FRML MDRD: >60 ML/MIN/1.73M2
GLUCOSE BLD-MCNC: 92 MG/DL (ref 70–99)
HCT VFR BLD CALC: 37.5 % (ref 37–47)
HEMOGLOBIN: 12.5 GM/DL (ref 12.5–16)
IGA: 207 MG/DL (ref 69–382)
IGG,SERUM: 1042 MG/DL (ref 723–1685)
IGM,SERUM: 39 MG/DL (ref 62–277)
LACTATE DEHYDROGENASE: 168 IU/L (ref 120–246)
LYMPHOCYTES ABSOLUTE: 0.4 K/CU MM
LYMPHOCYTES RELATIVE PERCENT: 21.6 % (ref 24–44)
MCH RBC QN AUTO: 31.8 PG (ref 27–31)
MCHC RBC AUTO-ENTMCNC: 33.3 % (ref 32–36)
MCV RBC AUTO: 95.4 FL (ref 78–100)
MONOCYTES ABSOLUTE: 0.3 K/CU MM
MONOCYTES RELATIVE PERCENT: 18.5 % (ref 0–4)
PDW BLD-RTO: 15.3 % (ref 11.7–14.9)
PLATELET # BLD: 147 K/CU MM (ref 140–440)
PMV BLD AUTO: 11.7 FL (ref 7.5–11.1)
POTASSIUM SERPL-SCNC: 4.1 MMOL/L (ref 3.5–5.1)
RBC # BLD: 3.93 M/CU MM (ref 4.2–5.4)
SEGMENTED NEUTROPHILS ABSOLUTE COUNT: 0.8 K/CU MM
SEGMENTED NEUTROPHILS RELATIVE PERCENT: 51.9 % (ref 36–66)
SODIUM BLD-SCNC: 138 MMOL/L (ref 135–145)
TOTAL PROTEIN: 6.3 GM/DL (ref 6.4–8.2)
WBC # BLD: 1.6 K/CU MM (ref 4–10.5)

## 2022-10-21 PROCEDURE — 85652 RBC SED RATE AUTOMATED: CPT

## 2022-10-21 PROCEDURE — 85025 COMPLETE CBC W/AUTO DIFF WBC: CPT

## 2022-10-21 PROCEDURE — 82784 ASSAY IGA/IGD/IGG/IGM EACH: CPT

## 2022-10-21 PROCEDURE — 36415 COLL VENOUS BLD VENIPUNCTURE: CPT

## 2022-10-21 PROCEDURE — 83615 LACTATE (LD) (LDH) ENZYME: CPT

## 2022-10-21 PROCEDURE — 82232 ASSAY OF BETA-2 PROTEIN: CPT

## 2022-10-21 PROCEDURE — 84165 PROTEIN E-PHORESIS SERUM: CPT

## 2022-10-21 PROCEDURE — 83883 ASSAY NEPHELOMETRY NOT SPEC: CPT

## 2022-10-21 PROCEDURE — 84155 ASSAY OF PROTEIN SERUM: CPT

## 2022-10-21 PROCEDURE — 80053 COMPREHEN METABOLIC PANEL: CPT

## 2022-10-21 PROCEDURE — 86320 SERUM IMMUNOELECTROPHORESIS: CPT

## 2022-10-23 LAB
BETA-2 MICROGLOBULIN: 2.7 MG/L
KAPPA QUANT FREE LIGHT CHAINS: 38.46 MG/L (ref 3.3–19.4)
KAPPA/LAMBDA FREE LIGHT CHAIN RATIO: 1.24 (ref 0.26–1.65)
LAMBDA FREE LIGHT CHAINS QNT: 30.98 MG/L (ref 5.71–26.3)

## 2022-10-24 LAB
ALBUMIN ELP: 3.5 GM/DL (ref 3.2–5.6)
ALPHA-1-GLOBULIN: 0.3 GM/DL (ref 0.1–0.4)
ALPHA-2-GLOBULIN: 0.5 GM/DL (ref 0.4–1.2)
BETA GLOBULIN: 0.9 GM/DL (ref 0.5–1.3)
GAMMA GLOBULIN: 1.1 GM/DL (ref 0.5–1.6)
SPEP INTERPRETATION: ABNORMAL
SPEP INTERPRETATION: NORMAL
TOTAL PROTEIN: 6.3 GM/DL (ref 6.4–8.2)

## 2022-10-28 ENCOUNTER — OFFICE VISIT (OUTPATIENT)
Dept: ONCOLOGY | Age: 67
End: 2022-10-28
Payer: MEDICARE

## 2022-10-28 ENCOUNTER — HOSPITAL ENCOUNTER (OUTPATIENT)
Dept: INFUSION THERAPY | Age: 67
Discharge: HOME | End: 2022-10-28
Payer: MEDICARE

## 2022-10-28 VITALS
HEART RATE: 61 BPM | TEMPERATURE: 98.1 F | BODY MASS INDEX: 26.6 KG/M2 | DIASTOLIC BLOOD PRESSURE: 65 MMHG | OXYGEN SATURATION: 99 % | HEIGHT: 64 IN | WEIGHT: 155.8 LBS | SYSTOLIC BLOOD PRESSURE: 137 MMHG

## 2022-10-28 DIAGNOSIS — C90.00 MULTIPLE MYELOMA NOT HAVING ACHIEVED REMISSION (HCC): Primary | ICD-10-CM

## 2022-10-28 PROCEDURE — 99214 OFFICE O/P EST MOD 30 MIN: CPT | Performed by: INTERNAL MEDICINE

## 2022-10-28 PROCEDURE — 99211 OFF/OP EST MAY X REQ PHY/QHP: CPT

## 2022-10-28 PROCEDURE — 3074F SYST BP LT 130 MM HG: CPT | Performed by: INTERNAL MEDICINE

## 2022-10-28 PROCEDURE — 3078F DIAST BP <80 MM HG: CPT | Performed by: INTERNAL MEDICINE

## 2022-10-28 PROCEDURE — 1123F ACP DISCUSS/DSCN MKR DOCD: CPT | Performed by: INTERNAL MEDICINE

## 2022-10-28 RX ORDER — AMOXICILLIN 500 MG/1
500 CAPSULE ORAL 2 TIMES DAILY
Qty: 20 CAPSULE | Refills: 0 | Status: SHIPPED | OUTPATIENT
Start: 2022-10-28 | End: 2022-11-07

## 2022-10-28 ASSESSMENT — PATIENT HEALTH QUESTIONNAIRE - PHQ9
SUM OF ALL RESPONSES TO PHQ QUESTIONS 1-9: 0
1. LITTLE INTEREST OR PLEASURE IN DOING THINGS: 0
2. FEELING DOWN, DEPRESSED OR HOPELESS: 0
SUM OF ALL RESPONSES TO PHQ QUESTIONS 1-9: 0
SUM OF ALL RESPONSES TO PHQ9 QUESTIONS 1 & 2: 0

## 2022-10-28 NOTE — PROGRESS NOTES
MA Rooming Questions  Patient: Chandler Pillai  MRN: [id number]    Date: 10/28/2022        1. Do you have any new issues? yes - patient states she has has a lingering sore throat that will not go away and she is also having some sinus drainage along with it. Patient is due to have some dental work done soon and would like to know if she need to take antibiotics         2. Do you need any refills on medications?    no    3. Have you had any imaging done since your last visit? yes - labs 10/21 cta 10/20    4. Have you been hospitalized or seen in the emergency room since your last visit here?   no    5. Did the patient have a depression screening completed today?  Yes    PHQ-9 Total Score: 0 (10/28/2022 10:24 AM)       PHQ-9 Given to (if applicable):               PHQ-9 Score (if applicable):                     [] Positive     []  Negative              Does question #9 need addressed (if applicable)                     [] Yes    []  No               Derek April, CMA

## 2022-10-28 NOTE — PROGRESS NOTES
Patient Name: Lazara Reyes  Patient : 1955  Patient MRN: [id number]     Primary Oncologist: Latisha Jean MD  Referring Provider: Zoraida Matthews MD     Date of Service: 10/28/2022      Chief Complaint:   Chief Complaint   Patient presents with    Follow-up    Results     Patient Active Problem List:     Multiple myeloma     HPI:  Corky Holly is a 70-year-old very pleasant female with medical history significant for hypertension, anxiety disorder, and anemia since 2015, initially referred to me for evaluation for her normocytic normochromic anemia. She stated that she has been having right lateral chest wall pain starting May 2015. She had chest x-ray done in 2015 and it showed left sixth rib fracture. Her pain has been controlled with ibuprofen since then. She was found to have anemia (hemoglobin 9.3) on nd she was started with oral iron supplementation. Her anemia got worse on repeat blood test done on 2015, (hemoglobin 8.1). She had chest x-ray and ultrasound of the abdomen on 2015, and they were normal.      She had a colonoscopy by Dr. Hola Mendez about three to four years ago which was normal according to her. She also had history of melanoma twice in the past (left lower extremity melanoma which was excised in  and right shoulder melanoma which was excised in ). She was also found to have elevated total protein by primary care physician. Because of her normocytic normochromic anemia associated with elevated total protein, she was subsequently referred to me for evaluation of possible plasma cell dyscrasia. She denies fever, night sweats, loss of appetite, and weight loss. She does not have any other significant symptoms except right lower chest wall pain.       Laboratory workups done on 2015, showed persistent normocytic normochromic anemia (hemoglobin 8.2 and MCV 89), persisted elevated total protein (total protein 14.1), normal calcium (calcium 9.9), slight elevation in serum creatinine (creatinine 1.2), elevated Beta-2 microglobulin (6.5), and elevated ESR (more than 120). Serum protein electrophoresis and immunofixation was sent on September 14, 2015, and it showed monoclonal IgG Kappa protein of 6.8 gram/dL. Serum Kappa light chain was 16.9 and serum Lambda light chain was less than 0.16. I did bone marrow biopsy on September 19, 2015, and the bone marrow aspirate flow cytometry showed 38 percent plasma cell population consistent with plasma cell neoplasm. Bone marrow biopsy  stain was about 98 percent positive for  positive plasma cell. 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 Gain of 1q has been associated with advanced disease and it is considered a high risk indicator. Cytogenetic studies showed normal female karyotype (46XX). Ms. Gloria Gonzales had a PET-CT scan on October 1, 2015, and it showed no metabolically active lytic lesions. Focal skin thickening in the mid medial right cuff with associated metabolic activity. This is non-specific and can be seen with infectious or inflammatory process; however, given the history of melanoma, recommend correlation with direct visualization. Ms. Gloria Gonzales was started with chemotherapy (bortezomib, cyclophosphamide, and dexamethasone) on October 2, 2015. Ms. Gloria Gonzales was seen by Dr. GUERRA [facility] at [facility] for possible autologous stem cell transplantation. Dr. GUERRA [facility] JANE and Ms. Serrano decided to have stem cell collection for future use. Stem cell was collected on the first week of March 2016. Our plan was to continue with current chemotherapy followed by maintenance chemotherapy.  Dr. GUERRA [facility] will consider autologous stem cell transplantation when her disease becomes refractory or progressed. Ms. Stanton Cotton completed her eight cycles of chemotherapy with cyclophosphamide, bortezomib, and dexamethasone on July 8, 2016. Maintenance chemotherapy with Velcade and dexamethasone was started on August 2, 2016 and she completed it on June 12, 2018. I recognized that her monoclonal protein went up to 1100 mg/dL on recent blood test done on July 18, 2018. She was subsequently evaluated by Dr. Bruna Fitzgerald and she believes it is due to stopping velcade. She recommend to resume maintenance chemotherapy. She prefers Nilaro maintenance over velcade because of ease of administration. Maintenance chemotherapy with Rachel Keepers was started on August 20, 2018 and we stopped it on November 27, 2018 since she has biochemical progression of the disease. We decided to change it to maintenance chemotherapy with Revlimid. Revlimid 10 mg daily was started on December 6, 2018. On October 28, 2022, she presented to me for followup. I have been following Ms. Serrano for IgG Kappa multiple myeloma and she is currently on maintenance chemotherapy with Revlimid (10 mg daily) since December 6, 2018. She is tolerating revlimid very well and she doesn't encounter any major side effects from it. I recognize that she has normal SPEP and BRENT on 10/21/22 blood test. It was probable faint M protein on 7/20/22, faint IgG kappa only on 4/13/22, faint IgG kappa only on 1/18/22, too small to measure on 10/15/21, 200 mg/dl on 7/22/21, 300 mg/dl on 4/22/21, 200mg/dl on 1/22/21, 200 mg/dl on October 23, 2020, very small to measure on [facility] on 7/20/20, 200 mg/dl on 4/22/2020, 200 mg/dl on 1/21/20, 430 mg/dl on 10/23/19, 200 mg on 7/23/19,  400mg on 4/23/19, 1000mg on  1/22/19 and 2100 mg/dL on blood test done on November 20, 2018. I believe she is responding very well to Revlimid and I recommend her to continue with that for now.  I will see her again in three months and I will repeat all the blood tests again a week before next appointment. We held revlimid since 10/21/22 due to neutropenia. I recommend her to resume it today. She has sore throat for last few days and will treat with amoxicillin 500 mg BID for 5 days. Chemo induced diarrhea - mild symptom only. Recommend to take imodium prn. She has mild neuropathy in her right upper extremity, but it is less likely to be related to revlimid. Will continue to monitor it for now. Skin lesions - s/p excision by dermatology. Path still pending. To continue with niacinamide. Osteoporosis - her primary care physician started fosamax. I recommend her to continue with that for now. Chemo induced neutropenia -  Stable and will continue to monitor it for now. COVID vaccine - She already received her COVID19 vaccine booster. VTE prophylaxis - I recommend her to continue with aspirin 81 mg daily. Hypertension - She is on atenolol. SBP is normal today. I recommend salt restriction. Chemo induced neuropathy - stable. To continue with cymbalta 60 mg daily. Health maintenance - I recommend her to have age-appropriate cancer screening, exercise, low-fat and low-sodium diet. Besides that, she does not have any other significant symptoms on  today's visit. PAST MEDICAL HISTORY:  Significant for:  1. Hypertension. 2.         Melanoma. 3.         Anxiety disorder. PAST SURGICAL HISTORY:  Significant for:  1. Total abdominal hysterectomy in 1999.  2.         Left lower extremity excision and biopsy for melanoma in 2006. 3.         Excision and biopsy of right shoulder melanoma in 2010. FAMILY HISTORY:  Significant for breast cancer in her mother. No other family history of cancer disease. SOCIAL HISTORY:  She denies smoking and illicit drug abuse. She socially drinks alcohol. She is  and she is currently living in [facility]. ALLERGIES:  No known drug allergies.      Oncology History    No history exists. Review of Systems: \"Per interval history; otherwise 10 point ROS is negative. \"  Her energy level is stable and her sleep is fine. She denies fever, chills, night sweats, cough, shortness of breath, chest pain, hemoptysis or palpitations. Her bowel and bladder functions are normal, except loose stool off and on. She doesn't have nausea, vomiting, abdominal pain, constipation, dysuria, loss of appetite or weight loss. She has mild stable neuropathy in her right upper extremity. She denies bleeding or clotting issues. She denies any pain in her body. Has mild anxiety. No depression. The rest of the systems are unremarkable. Vital Signs:  /65 (Site: Right Upper Arm, Position: Sitting, Cuff Size: Medium Adult)   Pulse 61   Temp 98.1 °F (36.7 °C) (Temporal)   Ht 5' 4\" (1.626 m)   Wt 155 lb 12.8 oz (70.7 kg)   SpO2 99%   BMI 26.74 kg/m²     Physical Exam:  CONSTITUTIONAL: awake, alert, cooperative, no apparent distress   EYES: pupils equal, round and reactive to light, sclera clear and conjunctiva normal  ENT: Normocephalic, without obvious abnormality, atraumatic  NECK: supple, symmetrical, no jugular venous distension and no carotid bruits   HEMATOLOGIC/LYMPHATIC: no cervical, supraclavicular or axillary lymphadenopathy   LUNGS: VBS, no wheezes, clear to auscultation, no crackles, no increased work of breathing, no rhonchi,    CARDIOVASCULAR: regular rate and rhythm, normal S1 and S2, no murmur noted  ABDOMEN: soft, non-distended, non-tender, normal bowel sounds x 4, no masses palpated, no hepatosplenomegaly   MUSCULOSKELETAL: full range of motion noted, tone is normal  NEUROLOGIC: awake, alert, oriented to name, place and time. Motor skills grossly intact. SKIN: Normal skin color, texture, turgor and no jaundice.  appears intact   EXTREMITIES: no clubbing, trace LE edema +, no cyanosis,        Labs:  Hematology:  Lab Results   Component Value Date    WBC 1.6 (LL) 10/21/2022    RBC 3.93 (L) 10/21/2022    HGB 12.5 10/21/2022    HCT 37.5 10/21/2022    MCV 95.4 10/21/2022    MCH 31.8 (H) 10/21/2022    MCHC 33.3 10/21/2022    RDW 15.3 (H) 10/21/2022     10/21/2022    MPV 11.7 (H) 10/21/2022    BANDSPCT 4 (L) 09/17/2015    SEGSPCT 51.9 10/21/2022    EOSRELPCT 7.4 (H) 10/21/2022    BASOPCT 0.6 10/21/2022    LYMPHOPCT 21.6 (L) 10/21/2022    MONOPCT 18.5 (H) 10/21/2022    BANDABS 0.17 09/17/2015    SEGSABS 0.8 10/21/2022    EOSABS 0.1 10/21/2022    BASOSABS 0.0 10/21/2022    LYMPHSABS 0.4 10/21/2022    MONOSABS 0.3 10/21/2022    DIFFTYPE AUTOMATED DIFFERENTIAL 10/21/2022    [facility] RARE 09/17/2015     Lab Results   Component Value Date    ESR 7 10/21/2022     Chemistry:  Lab Results   Component Value Date     10/21/2022    K 4.1 10/21/2022     10/21/2022    CO2 28 10/21/2022    BUN 15 10/21/2022    CREATININE 1.0 10/21/2022    GLUCOSE 92 10/21/2022    CALCIUM 9.2 10/21/2022    PROT 6.3 (L) 10/21/2022    PROT 6.3 (L) 10/21/2022    LABALBU 4.0 10/21/2022    BILITOT 0.5 10/21/2022    ALKPHOS 59 10/21/2022    AST 25 10/21/2022    ALT 33 10/21/2022    LABGLOM >60 10/21/2022    GFRAA >60 04/13/2022    AGRATIO 0.3 (L) 09/09/2015    GLOB 10.1 09/09/2015    PHOS 3.8 10/09/2015    MG 2.0 10/09/2015     Lab Results   Component Value Date     10/21/2022     No components found for: LD  Lab Results   Component Value Date    TSHHS 2.120 07/05/2018     Immunology:  Lab Results   Component Value Date    PROT 6.3 (L) 10/21/2022    PROT 6.3 (L) 10/21/2022    SPEP  10/21/2022     INTERPRETATION - Within normal limits. Please see concurrent BRENT. RS    SPEP INTERPRETATION - Within normal limits.   RS 10/21/2022    ALBUMINELP 3.5 10/21/2022    LABALPH 0.3 10/21/2022    LABALPH 0.5 10/21/2022    LABBETA 0.9 10/21/2022    GAMGLOB 1.1 10/21/2022     Lab Results   Component Value Date    KAPPAUVOL 38.46 (H) 10/21/2022    LAMBDAUVOL 24.98 10/23/2020    KLFLCR 1.24 10/21/2022     Lab Results   Component Value Date    B2M 2.7 10/21/2022     Coagulation Panel:  No results found for: PROTIME, INR, APTT, DDIMER  Anemia Panel:  No results found for: CPZNIMJJ81, FOLATE  Tumor Markers:  No results found for: , CEA, , LABCA2, PSA  Observations:  PHQ-9 Total Score: 0 (10/28/2022 10:24 AM)        Assessment:   1. Symptomatic IgG Kappa multiple myeloma. 2.         Normocytic normochromic anemia - due to multiple myeloma. Plan:  Ms. Pratik Ashford has been followed for IgG Kappa multiple myeloma. On October 28, 2022, she presented to me for followup. I have been following Ms. Serrano for IgG Kappa multiple myeloma and she is currently on maintenance chemotherapy with Revlimid (10 mg daily) since December 6, 2018. She is tolerating revlimid very well and she doesn't encounter any major side effects from it. I recognize that she has normal SPEP and BRENT on 10/21/22 blood test. It was probable faint M protein on 7/20/22, faint IgG kappa only on 4/13/22, faint IgG kappa only on 1/18/22, too small to measure on 10/15/21, 200 mg/dl on 7/22/21, 300 mg/dl on 4/22/21, 200mg/dl on 1/22/21, 200 mg/dl on October 23, 2020, very small to measure on [facility] on 7/20/20, 200 mg/dl on 4/22/2020, 200 mg/dl on 1/21/20, 430 mg/dl on 10/23/19, 200 mg on 7/23/19,  400mg on 4/23/19, 1000mg on  1/22/19 and 2100 mg/dL on blood test done on November 20, 2018. I believe she is responding very well to Revlimid and I recommend her to continue with that for now. I will see her again in three months and I will repeat all the blood tests again a week before next appointment. We held revlimid since 10/21/22 due to neutropenia. I recommend her to resume it today. She has sore throat for last few days and will treat with amoxicillin 500 mg BID for 5 days. Chemo induced diarrhea - mild symptom only. Recommend to take imodium prn.      She has mild neuropathy in her right upper extremity, but it is less likely to be related to revlimid. Will continue to monitor it for now. Skin lesions - s/p excision by dermatology. Path still pending. To continue with niacinamide. Osteoporosis - her primary care physician started fosamax. I recommend her to continue with that for now. Chemo induced neutropenia -  Stable and will continue to monitor it for now. COVID vaccine - She already received her COVID19 vaccine booster. VTE prophylaxis - I recommend her to continue with aspirin 81 mg daily. Hypertension - She is on atenolol. SBP is normal today. I recommend salt restriction. Chemo induced neuropathy - stable. To continue with cymbalta 60 mg daily. Health maintenance - I recommend her to have age-appropriate cancer screening, exercise, low-fat and low-sodium diet. I answered all her questions and concerns for today. Recent imaging and labs were reviewed and discussed with the patient.

## 2022-10-31 DIAGNOSIS — C90.00 MULTIPLE MYELOMA, REMISSION STATUS UNSPECIFIED (HCC): ICD-10-CM

## 2022-10-31 RX ORDER — LENALIDOMIDE 10 MG/1
10 CAPSULE ORAL DAILY
Qty: 28 CAPSULE | Refills: 0 | Status: ACTIVE | OUTPATIENT
Start: 2022-10-31

## 2022-10-31 NOTE — PROGRESS NOTES
Revlimid 10 mg chemo capsules reordered and e-scribed to [facility] as instructed by ELAINA MENDEZ per physician order. Kelsey Iverson # [id number] obtained through [employer] risk management/[employer] portal. Auth valid for 30 days. [address] will need passed along:      BIN#: [id number]  PCN#: [id number]  ID#: [id number]  Group#: [id number]

## 2022-11-01 NOTE — PROGRESS NOTES
55 A. [facility] Update    Date: 11/01/22    We do not have access to the prescribed medication. Prescription has been routed to [facility]. Please call us with any questions at [phone] opt.  2.

## 2022-11-14 ENCOUNTER — OFFICE VISIT (OUTPATIENT)
Dept: INTERNAL MEDICINE CLINIC | Age: 67
End: 2022-11-14
Payer: MEDICARE

## 2022-11-14 VITALS
OXYGEN SATURATION: 98 % | HEART RATE: 64 BPM | DIASTOLIC BLOOD PRESSURE: 72 MMHG | SYSTOLIC BLOOD PRESSURE: 138 MMHG | BODY MASS INDEX: 26.78 KG/M2 | RESPIRATION RATE: 18 BRPM | WEIGHT: 156 LBS

## 2022-11-14 DIAGNOSIS — R09.82 PND (POST-NASAL DRIP): Primary | ICD-10-CM

## 2022-11-14 PROCEDURE — 3074F SYST BP LT 130 MM HG: CPT | Performed by: INTERNAL MEDICINE

## 2022-11-14 PROCEDURE — 3078F DIAST BP <80 MM HG: CPT | Performed by: INTERNAL MEDICINE

## 2022-11-14 PROCEDURE — 99213 OFFICE O/P EST LOW 20 MIN: CPT | Performed by: INTERNAL MEDICINE

## 2022-11-14 PROCEDURE — 1123F ACP DISCUSS/DSCN MKR DOCD: CPT | Performed by: INTERNAL MEDICINE

## 2022-11-14 RX ORDER — FLUTICASONE PROPIONATE 50 MCG
2 SPRAY, SUSPENSION (ML) NASAL DAILY
Qty: 16 G | Refills: 0 | Status: SHIPPED | OUTPATIENT
Start: 2022-11-14

## 2022-11-14 NOTE — PROGRESS NOTES
Bobby Serrano  1955 11/14/22    SUBJECTIVE:    Pt complains of 1 month of sore throat. This is located on the left side of the throat and into the left ear. She has had mild rhinorrhea, mild hoarseness, PND. She is frequently clearing her throat. She denies F/C, nasal congestion, cough, SOB, wheezing, HB, reflux. She was seen by Dr Caitlyn Mcfadden 2 weeks ago, WBC count was low and she was given amox with no benefit. She was (-) for covid today. OBJECTIVE:    /72   Pulse 64   Resp 18   Wt 156 lb (70.8 kg)   SpO2 98%   BMI 26.78 kg/m²     Physical Exam  Constitutional:       Appearance: She is well-developed. HENT:      Right Ear: External ear normal.      Left Ear: External ear normal.      Nose: Nose normal.      Mouth/Throat:      Pharynx: No oropharyngeal exudate. Eyes:      Conjunctiva/sclera: Conjunctivae normal.   Cardiovascular:      Rate and Rhythm: Normal rate and regular rhythm. Heart sounds: Normal heart sounds. Pulmonary:      Effort: Pulmonary effort is normal.      Breath sounds: Normal breath sounds. Lymphadenopathy:      Cervical: No cervical adenopathy. Neurological:      Mental Status: She is alert.   (+) cobblestoning    ASSESSMENT:    1. PND (post-nasal drip)        PLAN:    General Yakelin was seen today for pharyngitis. Diagnoses and all orders for this visit:    PND (post-nasal drip) - exam essentially (-) except cobblestoning. I think the sore throat is from PND.  Tx with flonase, saline, zyrtec If not improving will refer to ENT for eval.   -     fluticasone (FLONASE) 50 MCG/ACT nasal spray; 2 sprays by Each Nostril route daily

## 2022-11-19 DIAGNOSIS — I10 ESSENTIAL HYPERTENSION: ICD-10-CM

## 2022-11-21 RX ORDER — ATENOLOL 50 MG/1
TABLET ORAL
Qty: 90 TABLET | Refills: 0 | Status: SHIPPED | OUTPATIENT
Start: 2022-11-21

## 2022-12-12 DIAGNOSIS — C90.00 MULTIPLE MYELOMA, REMISSION STATUS UNSPECIFIED (HCC): ICD-10-CM

## 2022-12-12 RX ORDER — LENALIDOMIDE 10 MG/1
10 CAPSULE ORAL DAILY
Qty: 28 CAPSULE | Refills: 0 | Status: ACTIVE | OUTPATIENT
Start: 2022-12-12

## 2022-12-12 NOTE — PROGRESS NOTES
Revlimid 10 chemo capsules ( Take 1 capsule by mouth daily QTY 28) reordered and sent to [facility] pharmacy. Radha Ingram # [id number] obtained through [employer] risk management/[employer] portal. Auth valid for 30 days.

## 2022-12-12 NOTE — PROGRESS NOTES
55 A. [facility] Update    Date: 12/12/22    Medication is currently being filled at [facility] and has been routed there. Please call us with any questions at [phone] opt.  2.

## 2023-01-12 DIAGNOSIS — C90.00 MULTIPLE MYELOMA, REMISSION STATUS UNSPECIFIED (HCC): ICD-10-CM

## 2023-01-12 RX ORDER — LENALIDOMIDE 10 MG/1
10 CAPSULE ORAL DAILY
Qty: 28 CAPSULE | Refills: 0 | Status: ACTIVE | OUTPATIENT
Start: 2023-01-12

## 2023-01-12 NOTE — PROGRESS NOTES
Revlimid 10 mg ( take 1 capsule by mouth daily days 1-28) chemo capsules reordered and sent to [facility] pharmacy. Marisa Hinsoner # [id number] obtained through [employer] risk management/[employer] portal. Auth valid for 30 days.

## 2023-01-17 DIAGNOSIS — I10 ESSENTIAL HYPERTENSION: ICD-10-CM

## 2023-01-17 RX ORDER — AMLODIPINE BESYLATE 2.5 MG/1
TABLET ORAL
Qty: 90 TABLET | Refills: 3 | Status: SHIPPED | OUTPATIENT
Start: 2023-01-17

## 2023-01-19 ENCOUNTER — HOSPITAL ENCOUNTER (OUTPATIENT)
Dept: INFUSION THERAPY | Age: 68
Discharge: HOME | End: 2023-01-19
Payer: MEDICARE

## 2023-01-19 ENCOUNTER — HOSPITAL ENCOUNTER (OUTPATIENT)
Age: 68
Discharge: HOME | End: 2023-01-19
Payer: MEDICARE

## 2023-01-19 DIAGNOSIS — I10 ESSENTIAL HYPERTENSION: ICD-10-CM

## 2023-01-19 DIAGNOSIS — C90.00 MULTIPLE MYELOMA NOT HAVING ACHIEVED REMISSION (HCC): ICD-10-CM

## 2023-01-19 DIAGNOSIS — C90.00 MULTIPLE MYELOMA, REMISSION STATUS UNSPECIFIED (HCC): Primary | ICD-10-CM

## 2023-01-19 LAB
ALBUMIN SERPL-MCNC: 4 GM/DL (ref 3.4–5)
ALP BLD-CCNC: 56 IU/L (ref 40–129)
ALT SERPL-CCNC: 27 U/L (ref 10–40)
ANION GAP SERPL CALCULATED.3IONS-SCNC: 9 MMOL/L (ref 4–16)
AST SERPL-CCNC: 23 IU/L (ref 15–37)
BASOPHILS ABSOLUTE: 0 K/CU MM
BASOPHILS ABSOLUTE: 0 K/CU MM
BASOPHILS RELATIVE PERCENT: 0.6 % (ref 0–1)
BASOPHILS RELATIVE PERCENT: 1.1 % (ref 0–1)
BILIRUB SERPL-MCNC: 0.6 MG/DL (ref 0–1)
BUN BLDV-MCNC: 14 MG/DL (ref 6–23)
CALCIUM SERPL-MCNC: 8.9 MG/DL (ref 8.3–10.6)
CHLORIDE BLD-SCNC: 98 MMOL/L (ref 99–110)
CO2: 28 MMOL/L (ref 21–32)
CREAT SERPL-MCNC: 0.9 MG/DL (ref 0.6–1.1)
DIFFERENTIAL TYPE: ABNORMAL
DIFFERENTIAL TYPE: ABNORMAL
EOSINOPHILS ABSOLUTE: 0 K/CU MM
EOSINOPHILS ABSOLUTE: 0.1 K/CU MM
EOSINOPHILS RELATIVE PERCENT: 2.2 % (ref 0–3)
EOSINOPHILS RELATIVE PERCENT: 3.5 % (ref 0–3)
ERYTHROCYTE SEDIMENTATION RATE: 3 MM/HR (ref 0–30)
GFR SERPL CREATININE-BSD FRML MDRD: >60 ML/MIN/1.73M2
GLUCOSE BLD-MCNC: 102 MG/DL (ref 70–99)
HCT VFR BLD CALC: 35.6 % (ref 37–47)
HCT VFR BLD CALC: 38.5 % (ref 37–47)
HEMOGLOBIN: 12.2 GM/DL (ref 12.5–16)
HEMOGLOBIN: 12.4 GM/DL (ref 12.5–16)
IGA: 230 MG/DL (ref 69–382)
IGG,SERUM: 1117 MG/DL (ref 723–1685)
IGM,SERUM: 38 MG/DL (ref 62–277)
IMMATURE NEUTROPHIL %: 0 % (ref 0–0.43)
LACTATE DEHYDROGENASE: 143 IU/L (ref 120–246)
LYMPHOCYTES ABSOLUTE: 0.3 K/CU MM
LYMPHOCYTES ABSOLUTE: 0.4 K/CU MM
LYMPHOCYTES RELATIVE PERCENT: 18.5 % (ref 24–44)
LYMPHOCYTES RELATIVE PERCENT: 19.2 % (ref 24–44)
MAGNESIUM: 2 MG/DL (ref 1.8–2.4)
MCH RBC QN AUTO: 31.2 PG (ref 27–31)
MCH RBC QN AUTO: 32.3 PG (ref 27–31)
MCHC RBC AUTO-ENTMCNC: 32.2 % (ref 32–36)
MCHC RBC AUTO-ENTMCNC: 34.3 % (ref 32–36)
MCV RBC AUTO: 94.2 FL (ref 78–100)
MCV RBC AUTO: 96.7 FL (ref 78–100)
MONOCYTES ABSOLUTE: 0.3 K/CU MM
MONOCYTES ABSOLUTE: 0.4 K/CU MM
MONOCYTES RELATIVE PERCENT: 17.6 % (ref 0–4)
MONOCYTES RELATIVE PERCENT: 20.2 % (ref 0–4)
NUCLEATED RBC %: 0 %
PDW BLD-RTO: 14.6 % (ref 11.7–14.9)
PDW BLD-RTO: 14.9 % (ref 11.7–14.9)
PLATELET # BLD: 164 K/CU MM (ref 140–440)
PLATELET # BLD: 182 K/CU MM (ref 140–440)
PMV BLD AUTO: 11 FL (ref 7.5–11.1)
PMV BLD AUTO: 11.4 FL (ref 7.5–11.1)
POTASSIUM SERPL-SCNC: 4.5 MMOL/L (ref 3.5–5.1)
RBC # BLD: 3.78 M/CU MM (ref 4.2–5.4)
RBC # BLD: 3.98 M/CU MM (ref 4.2–5.4)
SEGMENTED NEUTROPHILS ABSOLUTE COUNT: 1 K/CU MM
SEGMENTED NEUTROPHILS ABSOLUTE COUNT: 1.1 K/CU MM
SEGMENTED NEUTROPHILS RELATIVE PERCENT: 57.2 % (ref 36–66)
SEGMENTED NEUTROPHILS RELATIVE PERCENT: 59.9 % (ref 36–66)
SODIUM BLD-SCNC: 135 MMOL/L (ref 135–145)
TOTAL IMMATURE NEUTOROPHIL: 0 K/CU MM
TOTAL NUCLEATED RBC: 0 K/CU MM
TOTAL PROTEIN: 6.3 GM/DL (ref 6.4–8.2)
TOTAL PROTEIN: 6.4 GM/DL (ref 6.4–8.2)
WBC # BLD: 1.7 K/CU MM (ref 4–10.5)
WBC # BLD: 1.8 K/CU MM (ref 4–10.5)

## 2023-01-19 PROCEDURE — 85025 COMPLETE CBC W/AUTO DIFF WBC: CPT

## 2023-01-19 PROCEDURE — 85652 RBC SED RATE AUTOMATED: CPT

## 2023-01-19 PROCEDURE — 84165 PROTEIN E-PHORESIS SERUM: CPT

## 2023-01-19 PROCEDURE — 36415 COLL VENOUS BLD VENIPUNCTURE: CPT

## 2023-01-19 PROCEDURE — 83735 ASSAY OF MAGNESIUM: CPT

## 2023-01-19 PROCEDURE — 86320 SERUM IMMUNOELECTROPHORESIS: CPT

## 2023-01-19 PROCEDURE — 82784 ASSAY IGA/IGD/IGG/IGM EACH: CPT

## 2023-01-19 PROCEDURE — 84155 ASSAY OF PROTEIN SERUM: CPT

## 2023-01-19 PROCEDURE — 80053 COMPREHEN METABOLIC PANEL: CPT

## 2023-01-19 PROCEDURE — 83883 ASSAY NEPHELOMETRY NOT SPEC: CPT

## 2023-01-19 PROCEDURE — 83615 LACTATE (LD) (LDH) ENZYME: CPT

## 2023-01-19 PROCEDURE — 82232 ASSAY OF BETA-2 PROTEIN: CPT

## 2023-01-21 LAB
BETA-2 MICROGLOBULIN: 2.5 MG/L
KAPPA QUANT FREE LIGHT CHAINS: 44.44 MG/L (ref 3.3–19.4)
KAPPA/LAMBDA FREE LIGHT CHAIN RATIO: 1.52 (ref 0.26–1.65)
LAMBDA FREE LIGHT CHAINS QNT: 29.22 MG/L (ref 5.71–26.3)

## 2023-01-21 NOTE — PROGRESS NOTES
Patient Name: Libia Adame  Patient : 1955  Patient MRN: [id number]     Primary Oncologist: Vince Macario MD  Referring Provider: Sheila Kim MD     Date of Service: 2023      Chief Complaint:   Chief Complaint   Patient presents with    Follow-up     Patient Active Problem List:     Multiple myeloma     HPI:  Marcelo Cooper is a 80-year-old very pleasant female with medical history significant for hypertension, anxiety disorder, and anemia since 2015, initially referred to me for evaluation for her normocytic normochromic anemia. She stated that she has been having right lateral chest wall pain starting May 2015. She had chest x-ray done in 2015 and it showed left sixth rib fracture. Her pain has been controlled with ibuprofen since then. She was found to have anemia (hemoglobin 9.3) on nd she was started with oral iron supplementation. Her anemia got worse on repeat blood test done on 2015, (hemoglobin 8.1). She had chest x-ray and ultrasound of the abdomen on 2015, and they were normal.      She had a colonoscopy by Dr. Milton Davis about three to four years ago which was normal according to her. She also had history of melanoma twice in the past (left lower extremity melanoma which was excised in  and right shoulder melanoma which was excised in ). She was also found to have elevated total protein by primary care physician. Because of her normocytic normochromic anemia associated with elevated total protein, she was subsequently referred to me for evaluation of possible plasma cell dyscrasia. She denies fever, night sweats, loss of appetite, and weight loss. She does not have any other significant symptoms except right lower chest wall pain.       Laboratory workups done on 2015, showed persistent normocytic normochromic anemia (hemoglobin 8.2 and MCV 89), persisted elevated total protein (total protein 14.1), normal calcium (calcium 9.9), slight elevation in serum creatinine (creatinine 1.2), elevated Beta-2 microglobulin (6.5), and elevated ESR (more than 120). Serum protein electrophoresis and immunofixation was sent on September 14, 2015, and it showed monoclonal IgG Kappa protein of 6.8 gram/dL. Serum Kappa light chain was 16.9 and serum Lambda light chain was less than 0.16. I did bone marrow biopsy on September 19, 2015, and the bone marrow aspirate flow cytometry showed 38 percent plasma cell population consistent with plasma cell neoplasm. Bone marrow biopsy  stain was about 98 percent positive for  positive plasma cell. 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 Gain of 1q has been associated with advanced disease and it is considered a high risk indicator. Cytogenetic studies showed normal female karyotype (46XX). Ms. Abundio Mcmanus had a PET-CT scan on October 1, 2015, and it showed no metabolically active lytic lesions. Focal skin thickening in the mid medial right cuff with associated metabolic activity. This is non-specific and can be seen with infectious or inflammatory process; however, given the history of melanoma, recommend correlation with direct visualization. Ms. Abundio Mcmanus was started with chemotherapy (bortezomib, cyclophosphamide, and dexamethasone) on October 2, 2015. Ms. Abundio Mcmanus was seen by Dr. Inés Tompkins at [facility] for possible autologous stem cell transplantation. Dr. Inés Tompkins and Ms. Serrano decided to have stem cell collection for future use. Stem cell was collected on the first week of March 2016. Our plan was to continue with current chemotherapy followed by maintenance chemotherapy.  Dr. Inés Tompkins will consider autologous stem cell transplantation when her disease becomes refractory or progressed. Ms. Danika Hudson completed her eight cycles of chemotherapy with cyclophosphamide, bortezomib, and dexamethasone on July 8, 2016. Maintenance chemotherapy with Velcade and dexamethasone was started on August 2, 2016 and she completed it on June 12, 2018. I recognized that her monoclonal protein went up to 1100 mg/dL on recent blood test done on July 18, 2018. She was subsequently evaluated by Dr. Foreign Alvares and she believes it is due to stopping velcade. She recommend to resume maintenance chemotherapy. She prefers Nilaro maintenance over velcade because of ease of administration. Maintenance chemotherapy with Arthurine Deis was started on August 20, 2018 and we stopped it on November 27, 2018 since she has biochemical progression of the disease. We decided to change it to maintenance chemotherapy with Revlimid. Revlimid 10 mg daily was started on December 6, 2018. On January 25, 2023, she presented to me for followup. I have been following Ms. Serrano for IgG Kappa multiple myeloma and she is currently on maintenance chemotherapy with Revlimid (10 mg daily) since December 6, 2018. She is tolerating revlimid very well and she doesn't encounter any major side effects from it. I recognize that she has normal SPEP and BRENT on 1/19/23 blood test. It was normal on 10/21/22, probable faint M protein on 7/20/22, faint IgG kappa only on 4/13/22, faint IgG kappa only on 1/18/22, too small to measure on 10/15/21, 200 mg/dl on 7/22/21, 300 mg/dl on 4/22/21, 200mg/dl on 1/22/21, 200 mg/dl on October 23, 2020, very small to measure on [facility] on 7/20/20, 200 mg/dl on 4/22/2020, 200 mg/dl on 1/21/20, 430 mg/dl on 10/23/19, 200 mg on 7/23/19,  400mg on 4/23/19, 1000mg on  1/22/19 and 2100 mg/dL on blood test done on November 20, 2018. I believe she is responding very well to Revlimid and I recommend her to continue with that for now.  I will see her again in three months and I will repeat all the blood tests again a week before next appointment. We held revlimid since 10/21/22 due to neutropenia. I recommend her to resume it today. She has sore throat for last few days and will treat with amoxicillin 500 mg BID for 5 days. Chemo induced diarrhea - mild symptom only. Recommend to take imodium prn. She has mild neuropathy in her right upper extremity, but it is less likely to be related to revlimid. Will continue to monitor it for now. Skin lesions - s/p excision by dermatology. Path still pending. To continue with niacinamide. Osteoporosis - her primary care physician started fosamax. I recommend her to continue with that for now. Chemo induced neutropenia -  Stable and will continue to monitor it for now. COVID vaccine - She already received her COVID19 vaccine booster. VTE prophylaxis - I recommend her to continue with aspirin 81 mg daily. Hypertension - She is on atenolol. SBP is normal today. I recommend salt restriction. Chemo induced neuropathy - stable. To continue with cymbalta 60 mg daily. Health maintenance - I recommend her to have age-appropriate cancer screening, exercise, low-fat and low-sodium diet. Besides that, she does not have any other significant symptoms on  today's visit. PAST MEDICAL HISTORY:  Significant for:  1. Hypertension. 2.         Melanoma. 3.         Anxiety disorder. PAST SURGICAL HISTORY:  Significant for:  1. Total abdominal hysterectomy in 1999.  2.         Left lower extremity excision and biopsy for melanoma in 2006. 3.         Excision and biopsy of right shoulder melanoma in 2010. FAMILY HISTORY:  Significant for breast cancer in her mother. No other family history of cancer disease. SOCIAL HISTORY:  She denies smoking and illicit drug abuse. She socially drinks alcohol. She is  and she is currently living in [facility]. ALLERGIES:  No known drug allergies.      Oncology History    No history exists. Review of Systems: \"Per interval history; otherwise 10 point ROS is negative. \"  Her energy level is pretty good today and her sleep is fine. She denies fever, chills, night sweats, cough, shortness of breath, chest pain, hemoptysis or palpitations. Her bowel and bladder functions are normal, except loose stool off and on. She doesn't have nausea, vomiting, abdominal pain, constipation, dysuria, loss of appetite or weight loss. She has mild stable neuropathy in her right upper extremity. She denies bleeding or clotting issues. She doesn't have any pain in her body. Has mild anxiety. Denies depression. The rest of the systems are unremarkable. Vital Signs:  /73 (Site: Right Upper Arm, Position: Sitting, Cuff Size: Medium Adult)   Pulse 62   Temp (!) 96.7 °F (35.9 °C) (Infrared)   Resp 16   Ht 5' 4\" (1.626 m)   Wt 154 lb (69.9 kg)   SpO2 98%   BMI 26.43 kg/m²     Physical Exam:  CONSTITUTIONAL: awake, alert, cooperative, no apparent distress   EYES: pupils equal, round and reactive to light, sclera clear and conjunctiva normal  ENT: Normocephalic, without obvious abnormality, atraumatic  NECK: supple, symmetrical, no jugular venous distension and no carotid bruits   HEMATOLOGIC/LYMPHATIC: no cervical, supraclavicular or axillary lymphadenopathy   LUNGS: VBS, no wheezes, clear to auscultation, no crackles, no increased work of breathing, no rhonchi,    CARDIOVASCULAR: regular rate and rhythm, normal S1 and S2, no murmur noted  ABDOMEN: soft, non-distended, non-tender, normal bowel sounds x 4, no masses palpated, no hepatosplenomegaly   MUSCULOSKELETAL: full range of motion noted, tone is normal  NEUROLOGIC: awake, alert, oriented to name, place and time. Motor skills grossly intact. SKIN: Normal skin color, texture, turgor and no jaundice.  appears intact   EXTREMITIES: no LE edema, no clubbing, no cyanosis,        Labs:  Hematology:  Lab Results   Component Value Date    WBC 1.8 (LL) 01/19/2023    RBC 3.98 (L) 01/19/2023    HGB 12.4 (L) 01/19/2023    HCT 38.5 01/19/2023    MCV 96.7 01/19/2023    MCH 31.2 (H) 01/19/2023    MCHC 32.2 01/19/2023    RDW 14.6 01/19/2023     01/19/2023    MPV 11.4 (H) 01/19/2023    BANDSPCT 4 (L) 09/17/2015    SEGSPCT 59.9 01/19/2023    EOSRELPCT 2.2 01/19/2023    BASOPCT 1.1 (H) 01/19/2023    LYMPHOPCT 19.2 (L) 01/19/2023    MONOPCT 17.6 (H) 01/19/2023    BANDABS 0.17 09/17/2015    SEGSABS 1.1 01/19/2023    EOSABS 0.0 01/19/2023    BASOSABS 0.0 01/19/2023    LYMPHSABS 0.4 01/19/2023    MONOSABS 0.3 01/19/2023    DIFFTYPE AUTOMATED DIFFERENTIAL 01/19/2023    WBCMORP RARE 09/17/2015     Lab Results   Component Value Date    ESR 3 01/19/2023     Chemistry:  Lab Results   Component Value Date     01/19/2023    K 4.5 01/19/2023    CL 98 (L) 01/19/2023    CO2 28 01/19/2023    BUN 14 01/19/2023    CREATININE 0.9 01/19/2023    GLUCOSE 102 (H) 01/19/2023    CALCIUM 8.9 01/19/2023    PROT 6.4 01/19/2023    LABALBU 4.0 01/19/2023    BILITOT 0.6 01/19/2023    ALKPHOS 56 01/19/2023    AST 23 01/19/2023    ALT 27 01/19/2023    LABGLOM >60 01/19/2023    GFRAA >60 04/13/2022    AGRATIO 0.3 (L) 09/09/2015    GLOB 10.1 09/09/2015    PHOS 3.8 10/09/2015    MG 2.0 01/19/2023     Lab Results   Component Value Date     01/19/2023     No components found for: LD  Lab Results   Component Value Date    TSHHS 2.120 07/05/2018     Immunology:  Lab Results   Component Value Date    PROT 6.4 01/19/2023    SPEP  01/19/2023     INTERPRETATION - Decresae in total protein. Caretha Brittle, MD    SPEP  01/19/2023     INTERPRETATION - Monoclonal gammopathy is not identified.   Caretha Brittle, MD    ALBUMINELP 3.3 01/19/2023    LABALPH 0.3 01/19/2023    LABALPH 0.6 01/19/2023    LABBETA 0.9 01/19/2023    GAMGLOB 1.1 01/19/2023     Lab Results   Component Value Date    KAPPAUVOL 44.44 (H) 01/19/2023    LAMBDAUVOL 24.98 10/23/2020    KLFLCR 1.52 01/19/2023     Lab Results Component Value Date    B2M 2.5 01/19/2023     Coagulation Panel:  No results found for: PROTIME, INR, APTT, DDIMER  Anemia Panel:  No results found for: KRKKLIYH60, FOLATE  Tumor Markers:  No results found for: , CEA, , LABCA2, PSA  Observations:  PHQ-9 Total Score: 0 (1/25/2023  9:02 AM)        Assessment:   1. Symptomatic IgG Kappa multiple myeloma. 2.         Normocytic normochromic anemia - due to multiple myeloma. Plan:  Ms. Richi Chirinos has been followed for IgG Kappa multiple myeloma. On January 25, 2023, she presented to me for followup. I have been following Ms. Serrano for IgG Kappa multiple myeloma and she is currently on maintenance chemotherapy with Revlimid (10 mg daily) since December 6, 2018. She is tolerating revlimid very well and she doesn't encounter any major side effects from it. I recognize that she has normal SPEP and BRENT on 1/19/23 blood test. It was normal on 10/21/22, probable faint M protein on 7/20/22, faint IgG kappa only on 4/13/22, faint IgG kappa only on 1/18/22, too small to measure on 10/15/21, 200 mg/dl on 7/22/21, 300 mg/dl on 4/22/21, 200mg/dl on 1/22/21, 200 mg/dl on October 23, 2020, very small to measure on [facility] on 7/20/20, 200 mg/dl on 4/22/2020, 200 mg/dl on 1/21/20, 430 mg/dl on 10/23/19, 200 mg on 7/23/19,  400mg on 4/23/19, 1000mg on  1/22/19 and 2100 mg/dL on blood test done on November 20, 2018. I believe she is responding very well to Revlimid and I recommend her to continue with that for now. I will see her again in three months and I will repeat all the blood tests again a week before next appointment. We held revlimid since 10/21/22 due to neutropenia. I recommend her to resume it today. She has sore throat for last few days and will treat with amoxicillin 500 mg BID for 5 days. Chemo induced diarrhea - mild symptom only. Recommend to take imodium prn.      She has mild neuropathy in her right upper extremity, but it is less likely to be related to revlimid. Will continue to monitor it for now. Skin lesions - s/p excision by dermatology. Path still pending. To continue with niacinamide. Osteoporosis - her primary care physician started fosamax. I recommend her to continue with that for now. Chemo induced neutropenia -  Stable and will continue to monitor it for now. COVID vaccine - She already received her COVID19 vaccine booster. VTE prophylaxis - I recommend her to continue with aspirin 81 mg daily. Hypertension - She is on atenolol. SBP is normal today. I recommend salt restriction. Chemo induced neuropathy - stable. To continue with cymbalta 60 mg daily. Health maintenance - I recommend her to have age-appropriate cancer screening, exercise, low-fat and low-sodium diet. I answered all her questions and concerns for today. Recent imaging and labs were reviewed and discussed with the patient.

## 2023-01-23 LAB
ALBUMIN ELP: 3.3 GM/DL (ref 3.2–5.6)
ALPHA-1-GLOBULIN: 0.3 GM/DL (ref 0.1–0.4)
ALPHA-2-GLOBULIN: 0.6 GM/DL (ref 0.4–1.2)
BETA GLOBULIN: 0.9 GM/DL (ref 0.5–1.3)
GAMMA GLOBULIN: 1.1 GM/DL (ref 0.5–1.6)
SPEP INTERPRETATION: ABNORMAL
SPEP INTERPRETATION: NORMAL
TOTAL PROTEIN: 6.3 GM/DL (ref 6.4–8.2)

## 2023-01-25 ENCOUNTER — HOSPITAL ENCOUNTER (OUTPATIENT)
Dept: INFUSION THERAPY | Age: 68
Discharge: HOME | End: 2023-01-25
Payer: MEDICARE

## 2023-01-25 ENCOUNTER — OFFICE VISIT (OUTPATIENT)
Dept: ONCOLOGY | Age: 68
End: 2023-01-25
Payer: MEDICARE

## 2023-01-25 VITALS
DIASTOLIC BLOOD PRESSURE: 73 MMHG | RESPIRATION RATE: 16 BRPM | BODY MASS INDEX: 26.29 KG/M2 | TEMPERATURE: 96.7 F | OXYGEN SATURATION: 98 % | HEART RATE: 62 BPM | SYSTOLIC BLOOD PRESSURE: 129 MMHG | WEIGHT: 154 LBS | HEIGHT: 64 IN

## 2023-01-25 DIAGNOSIS — C90.00 MULTIPLE MYELOMA NOT HAVING ACHIEVED REMISSION (HCC): Primary | ICD-10-CM

## 2023-01-25 DIAGNOSIS — I10 ESSENTIAL HYPERTENSION: ICD-10-CM

## 2023-01-25 PROCEDURE — 3074F SYST BP LT 130 MM HG: CPT | Performed by: INTERNAL MEDICINE

## 2023-01-25 PROCEDURE — 99211 OFF/OP EST MAY X REQ PHY/QHP: CPT

## 2023-01-25 PROCEDURE — 99214 OFFICE O/P EST MOD 30 MIN: CPT | Performed by: INTERNAL MEDICINE

## 2023-01-25 PROCEDURE — 3078F DIAST BP <80 MM HG: CPT | Performed by: INTERNAL MEDICINE

## 2023-01-25 PROCEDURE — 1123F ACP DISCUSS/DSCN MKR DOCD: CPT | Performed by: INTERNAL MEDICINE

## 2023-01-25 RX ORDER — ATENOLOL 50 MG/1
TABLET ORAL
Qty: 90 TABLET | Refills: 2 | Status: SHIPPED | OUTPATIENT
Start: 2023-01-25

## 2023-01-25 ASSESSMENT — PATIENT HEALTH QUESTIONNAIRE - PHQ9
SUM OF ALL RESPONSES TO PHQ QUESTIONS 1-9: 0
2. FEELING DOWN, DEPRESSED OR HOPELESS: 0
1. LITTLE INTEREST OR PLEASURE IN DOING THINGS: 0
SUM OF ALL RESPONSES TO PHQ9 QUESTIONS 1 & 2: 0
SUM OF ALL RESPONSES TO PHQ QUESTIONS 1-9: 0

## 2023-01-25 NOTE — PROGRESS NOTES
GENERAL:  Patient denies fevers, chills, night sweats, anorexia, weight loss, but complains of: excessive fatigue,appetite varies weight up 2.5 lbs.   ALLERGIC/IMMUNOLOGIC: reviewed.   EYES:  Patient denies  significant visual difficulties, diplopia, but complains of: blurred vision   ENT/MOUTH:  Patient denies sore throat, mouth sores, but complains of:  problems with hearing and sinus drainage  ENDOCRINE:  Patient denies diabetes, thyroid disease, hormone replacement, hot flashes or night sweats  HEMATOLOGIC/LYMPHATIC: Patient denies tender or palpable lymph nodes, but complains of:  easy bruising or bleeding  BREASTS: Patient denies abnormal masses of breast, nipple discharge, pain  RESPIRATORY:  Patient denies chest pain, cough, hemoptysis, but complains of: dyspnea  CARDIOVASCULAR:  Patient denies anginal chest pain, palpitations, orthopnea, peripheral edema   GASTROINTESTINAL: Patient denies nausea, vomiting, GI bleeding, change in bowel habits, early satiety, but complains of: diarrhea, constipation and heartburn   (FEMALE):  Patient denies abnormal genital masses, hematuria, hesitancy, vaginal bleeding, discharge, other problems with urination, but complains of:  incontinence  MUSCULOSKELETAL:  Patient denies swelling or redness, decreased range of motion, but complains of:  joint pain and leg and ankle swelling  SKIN:  Patient denies chronic rashes, inflammation, ulcerations or skin changes  NEUROLOGIC:  Patient denies headache, blurred vision, areas of focal weakness or numbness, sensory problems, but complains of: abnormal gait  PSYCHIATRIC: Patient denies anxiety, depression, rudy or mood swings, psychotropic drugs, but complains of: insomnia    Permission obtained to discuss medial information with family member present.      MA Rooming Questions  Patient: Adina Guerrero  MRN: [id number]    Date: 1/25/2023        1. Do you have any new issues?   no         2. Do you need any refills on medications? Yes - Atenolol  3. Have you had any imaging done since your last visit?   no    4. Have you been hospitalized or seen in the emergency room since your last visit here?   no    5. Did the patient have a depression screening completed today?  Yes    PHQ-9 Total Score: 0 (1/25/2023  9:02 AM)       PHQ-9 Given to (if applicable):               PHQ-9 Score (if applicable):                     [] Positive     [x]  Negative              Does question #9 need addressed (if applicable)                     [] Yes    []  No               Deysi Nicholas MA

## 2023-01-26 ENCOUNTER — OFFICE VISIT (OUTPATIENT)
Dept: INTERNAL MEDICINE CLINIC | Age: 68
End: 2023-01-26
Payer: MEDICARE

## 2023-01-26 VITALS
OXYGEN SATURATION: 99 % | SYSTOLIC BLOOD PRESSURE: 118 MMHG | HEART RATE: 58 BPM | WEIGHT: 154.2 LBS | BODY MASS INDEX: 26.47 KG/M2 | DIASTOLIC BLOOD PRESSURE: 74 MMHG | RESPIRATION RATE: 12 BRPM

## 2023-01-26 DIAGNOSIS — C90.00 MULTIPLE MYELOMA NOT HAVING ACHIEVED REMISSION (HCC): Primary | ICD-10-CM

## 2023-01-26 DIAGNOSIS — E78.2 MIXED HYPERLIPIDEMIA: ICD-10-CM

## 2023-01-26 DIAGNOSIS — I10 PRIMARY HYPERTENSION: ICD-10-CM

## 2023-01-26 DIAGNOSIS — I26.94 MULTIPLE SUBSEGMENTAL PULMONARY EMBOLI WITHOUT ACUTE COR PULMONALE (HCC): ICD-10-CM

## 2023-01-26 DIAGNOSIS — G62.0 DRUG-INDUCED POLYNEUROPATHY (HCC): ICD-10-CM

## 2023-01-26 PROCEDURE — 99214 OFFICE O/P EST MOD 30 MIN: CPT | Performed by: INTERNAL MEDICINE

## 2023-01-26 PROCEDURE — 1123F ACP DISCUSS/DSCN MKR DOCD: CPT | Performed by: INTERNAL MEDICINE

## 2023-01-26 PROCEDURE — 3074F SYST BP LT 130 MM HG: CPT | Performed by: INTERNAL MEDICINE

## 2023-01-26 PROCEDURE — 3078F DIAST BP <80 MM HG: CPT | Performed by: INTERNAL MEDICINE

## 2023-01-26 NOTE — PROGRESS NOTES
Nisha Serrano  1955 01/26/23    SUBJECTIVE:      M-protein is very low. WBC are low but stable. She continues on revlimid. The patient is taking hypertensive medications compliantly without side effects. Denies chest pain, dyspnea, edema, or TIA's. She continues on eliquis. She denies any blood in the stool, black stools. Cymbalta doing well for pain. OBJECTIVE:    /74   Pulse 58   Resp 12   Wt 154 lb 3.2 oz (69.9 kg)   SpO2 99%   BMI 26.47 kg/m²     Physical Exam  Constitutional:       Appearance: She is well-developed. Eyes:      General: No scleral icterus. Conjunctiva/sclera: Conjunctivae normal.   Neck:      Thyroid: No thyromegaly. Trachea: No tracheal deviation. Cardiovascular:      Rate and Rhythm: Normal rate and regular rhythm. Heart sounds: No murmur heard. No friction rub. No gallop. Pulmonary:      Effort: No respiratory distress. Breath sounds: No wheezing or rales. Abdominal:      General: Bowel sounds are normal. There is no distension. Palpations: Abdomen is soft. There is no hepatomegaly or mass. Tenderness: There is no abdominal tenderness. There is no guarding or rebound. Musculoskeletal:      Cervical back: Neck supple. Lymphadenopathy:      Cervical: No cervical adenopathy. Skin:     Nails: There is no clubbing. Neurological:      Mental Status: She is alert and oriented to person, place, and time. Psychiatric:         Behavior: Behavior normal.         Judgment: Judgment normal.       ASSESSMENT:    1. Multiple myeloma not having achieved remission ([address]    2. Mixed hyperlipidemia    3. Primary hypertension    4. Drug-induced polyneuropathy ([address]        PLAN:    Steven Champion was seen today for 6 month follow-up.     Diagnoses and all orders for this visit:    Multiple myeloma not having achieved remission ([address] - doing great with revlimid    Mixed hyperlipidemia - reviewed labs, no change    Primary hypertension - at goal; no change in mgmt    Drug-induced polyneuropathy ([address]  - doing well with cymbalta    PE - cont xarelto indefinitely

## 2023-02-17 ENCOUNTER — CLINICAL DOCUMENTATION (OUTPATIENT)
Dept: ONCOLOGY | Age: 68
End: 2023-02-17

## 2023-02-17 DIAGNOSIS — C90.00 MULTIPLE MYELOMA, REMISSION STATUS UNSPECIFIED (HCC): ICD-10-CM

## 2023-02-17 RX ORDER — LENALIDOMIDE 10 MG/1
10 CAPSULE ORAL DAILY
Qty: 28 CAPSULE | Refills: 0 | Status: ACTIVE | OUTPATIENT
Start: 2023-02-17

## 2023-02-17 NOTE — TELEPHONE ENCOUNTER
Patient contacted our office in need of refill for RELVAMID. Patient has a scheduled appointment on 04/20/23. Patients preferred pharmacy is [facility] IN [place] BUT PATIENT ALSO HAS BIOLOGICS IN HER CHART UNSURE WHICH SHE IS CURRENTLY USING. Pending for [employer].

## 2023-02-20 NOTE — PROGRESS NOTES
Revlimid 10 chemo capsules( take 1 by mouth daily for 28 days) reordered and sent to [facility]. Jeanie Warren # [id number] obtained through [employer] risk management/[employer] portal. Auth valid for 30 days.

## 2023-02-21 RX ORDER — APIXABAN 5 MG/1
TABLET, FILM COATED ORAL
Qty: 60 TABLET | Refills: 5 | Status: SHIPPED | OUTPATIENT
Start: 2023-02-21

## 2023-03-06 RX ORDER — DULOXETIN HYDROCHLORIDE 60 MG/1
CAPSULE, DELAYED RELEASE ORAL
Qty: 30 CAPSULE | Refills: 11 | Status: SHIPPED | OUTPATIENT
Start: 2023-03-06

## 2023-03-16 ENCOUNTER — CLINICAL DOCUMENTATION (OUTPATIENT)
Dept: ONCOLOGY | Age: 68
End: 2023-03-16

## 2023-03-16 DIAGNOSIS — C90.00 MULTIPLE MYELOMA, REMISSION STATUS UNSPECIFIED (HCC): ICD-10-CM

## 2023-03-16 RX ORDER — LENALIDOMIDE 10 MG/1
10 CAPSULE ORAL DAILY
Qty: 28 CAPSULE | Refills: 0 | Status: ACTIVE | OUTPATIENT
Start: 2023-03-16

## 2023-03-16 NOTE — PROGRESS NOTES
Revlimid 10 mg chemo capsules reordered ( take 1 by mouth daily for 28 days) and sent to [facility]. Jeannette Sewell # [id number] obtained through [employer] risk management/[employer] portal. Auth valid for 30 days.

## 2023-04-13 ENCOUNTER — HOSPITAL ENCOUNTER (OUTPATIENT)
Dept: INFUSION THERAPY | Age: 68
Discharge: HOME | End: 2023-04-13
Payer: MEDICARE

## 2023-04-13 DIAGNOSIS — I10 ESSENTIAL HYPERTENSION: ICD-10-CM

## 2023-04-13 DIAGNOSIS — C90.00 MULTIPLE MYELOMA NOT HAVING ACHIEVED REMISSION (HCC): ICD-10-CM

## 2023-04-13 LAB
ALBUMIN SERPL-MCNC: 3.9 GM/DL (ref 3.4–5)
ALP BLD-CCNC: 55 IU/L (ref 40–129)
ALT SERPL-CCNC: 26 U/L (ref 10–40)
ANION GAP SERPL CALCULATED.3IONS-SCNC: 11 MMOL/L (ref 4–16)
AST SERPL-CCNC: 26 IU/L (ref 15–37)
BASOPHILS ABSOLUTE: 0 K/CU MM
BASOPHILS RELATIVE PERCENT: 1.1 % (ref 0–1)
BILIRUB SERPL-MCNC: 0.6 MG/DL (ref 0–1)
BUN SERPL-MCNC: 14 MG/DL (ref 6–23)
CALCIUM SERPL-MCNC: 8.7 MG/DL (ref 8.3–10.6)
CHLORIDE BLD-SCNC: 100 MMOL/L (ref 99–110)
CO2: 25 MMOL/L (ref 21–32)
CREAT SERPL-MCNC: 0.9 MG/DL (ref 0.6–1.1)
DIFFERENTIAL TYPE: ABNORMAL
EOSINOPHILS ABSOLUTE: 0.1 K/CU MM
EOSINOPHILS RELATIVE PERCENT: 5.7 % (ref 0–3)
ERYTHROCYTE SEDIMENTATION RATE: 2 MM/HR (ref 0–30)
GFR SERPL CREATININE-BSD FRML MDRD: >60 ML/MIN/1.73M2
GLUCOSE SERPL-MCNC: 113 MG/DL (ref 70–99)
HCT VFR BLD CALC: 35.7 % (ref 37–47)
HEMOGLOBIN: 12.2 GM/DL (ref 12.5–16)
LACTATE DEHYDROGENASE: 151 IU/L (ref 120–246)
LYMPHOCYTES ABSOLUTE: 0.3 K/CU MM
LYMPHOCYTES RELATIVE PERCENT: 17.8 % (ref 24–44)
MCH RBC QN AUTO: 32 PG (ref 27–31)
MCHC RBC AUTO-ENTMCNC: 34.2 % (ref 32–36)
MCV RBC AUTO: 93.7 FL (ref 78–100)
MONOCYTES ABSOLUTE: 0.3 K/CU MM
MONOCYTES RELATIVE PERCENT: 17.2 % (ref 0–4)
PDW BLD-RTO: 14.9 % (ref 11.7–14.9)
PLATELET # BLD: 159 K/CU MM (ref 140–440)
PMV BLD AUTO: 11.5 FL (ref 7.5–11.1)
POTASSIUM SERPL-SCNC: 3.9 MMOL/L (ref 3.5–5.1)
RBC # BLD: 3.81 M/CU MM (ref 4.2–5.4)
SEGMENTED NEUTROPHILS ABSOLUTE COUNT: 1 K/CU MM
SEGMENTED NEUTROPHILS RELATIVE PERCENT: 58.2 % (ref 36–66)
SODIUM BLD-SCNC: 136 MMOL/L (ref 135–145)
TOTAL PROTEIN: 6.2 GM/DL (ref 6.4–8.2)
WBC # BLD: 1.7 K/CU MM (ref 4–10.5)

## 2023-04-13 PROCEDURE — 86320 SERUM IMMUNOELECTROPHORESIS: CPT

## 2023-04-13 PROCEDURE — 85025 COMPLETE CBC W/AUTO DIFF WBC: CPT

## 2023-04-13 PROCEDURE — 84165 PROTEIN E-PHORESIS SERUM: CPT

## 2023-04-13 PROCEDURE — 83883 ASSAY NEPHELOMETRY NOT SPEC: CPT

## 2023-04-13 PROCEDURE — 82784 ASSAY IGA/IGD/IGG/IGM EACH: CPT

## 2023-04-13 PROCEDURE — 84155 ASSAY OF PROTEIN SERUM: CPT

## 2023-04-13 PROCEDURE — 82232 ASSAY OF BETA-2 PROTEIN: CPT

## 2023-04-13 PROCEDURE — 80053 COMPREHEN METABOLIC PANEL: CPT

## 2023-04-13 PROCEDURE — 36415 COLL VENOUS BLD VENIPUNCTURE: CPT

## 2023-04-13 PROCEDURE — 85652 RBC SED RATE AUTOMATED: CPT

## 2023-04-13 PROCEDURE — 83615 LACTATE (LD) (LDH) ENZYME: CPT

## 2023-04-14 LAB
B2 MICROGLOB SERPL-MCNC: 2.4 MG/L
IGA: 191 MG/DL (ref 69–382)
IGG,SERUM: 1085 MG/DL (ref 723–1685)
IGM,SERUM: 31 MG/DL (ref 62–277)

## 2023-04-15 LAB
KAPPA LC FREE SER-MCNC: 40.65 MG/L (ref 3.3–19.4)
KAPPA LC FREE/LAMBDA FREE SER NEPH: 1.35 {RATIO} (ref 0.26–1.65)
LAMBDA LC FREE SERPL-MCNC: 30.13 MG/L (ref 5.71–26.3)

## 2023-04-17 ENCOUNTER — TELEPHONE (OUTPATIENT)
Dept: ONCOLOGY | Age: 68
End: 2023-04-17

## 2023-04-17 DIAGNOSIS — C90.00 MULTIPLE MYELOMA, REMISSION STATUS UNSPECIFIED (HCC): ICD-10-CM

## 2023-04-17 RX ORDER — LENALIDOMIDE 10 MG/1
10 CAPSULE ORAL DAILY
Qty: 28 CAPSULE | Refills: 0 | Status: ACTIVE | OUTPATIENT
Start: 2023-04-17

## 2023-04-17 NOTE — TELEPHONE ENCOUNTER
Revlimid 10 mg chemo capsules #28 reordered and sent to [facility]. Prescriber survey completed. Royal Moreno # [id number] obtained through [employer] risk management/[employer] portal. Auth valid for 30 days.

## 2023-04-18 LAB
ALBUMIN SERPL ELPH-MCNC: 3.4 GM/DL (ref 3.2–5.6)
ALPHA-1-GLOBULIN: 0.3 GM/DL (ref 0.1–0.4)
ALPHA-2-GLOBULIN: 0.5 GM/DL (ref 0.4–1.2)
BETA GLOBULIN: 0.9 GM/DL (ref 0.5–1.3)
GAMMA GLOBULIN: 1.1 GM/DL (ref 0.5–1.6)
SPEP INTERPRETATION: ABNORMAL
SPEP INTERPRETATION: NORMAL
TOTAL PROTEIN: 6.2 GM/DL (ref 6.4–8.2)

## 2023-04-20 ENCOUNTER — OFFICE VISIT (OUTPATIENT)
Dept: ONCOLOGY | Age: 68
End: 2023-04-20
Payer: MEDICARE

## 2023-04-20 ENCOUNTER — HOSPITAL ENCOUNTER (OUTPATIENT)
Dept: INFUSION THERAPY | Age: 68
Discharge: HOME | End: 2023-04-20
Payer: MEDICARE

## 2023-04-20 VITALS
HEART RATE: 60 BPM | SYSTOLIC BLOOD PRESSURE: 136 MMHG | DIASTOLIC BLOOD PRESSURE: 63 MMHG | WEIGHT: 152.2 LBS | TEMPERATURE: 97.3 F | OXYGEN SATURATION: 98 % | RESPIRATION RATE: 16 BRPM | BODY MASS INDEX: 25.99 KG/M2 | HEIGHT: 64 IN

## 2023-04-20 DIAGNOSIS — C90.00 MULTIPLE MYELOMA NOT HAVING ACHIEVED REMISSION (HCC): Primary | ICD-10-CM

## 2023-04-20 PROCEDURE — 1123F ACP DISCUSS/DSCN MKR DOCD: CPT | Performed by: INTERNAL MEDICINE

## 2023-04-20 PROCEDURE — 3078F DIAST BP <80 MM HG: CPT | Performed by: INTERNAL MEDICINE

## 2023-04-20 PROCEDURE — 99211 OFF/OP EST MAY X REQ PHY/QHP: CPT

## 2023-04-20 PROCEDURE — 3075F SYST BP GE 130 - 139MM HG: CPT | Performed by: INTERNAL MEDICINE

## 2023-04-20 PROCEDURE — 99214 OFFICE O/P EST MOD 30 MIN: CPT | Performed by: INTERNAL MEDICINE

## 2023-04-20 NOTE — PROGRESS NOTES
MA Rooming Questions  Patient: Amy Holland  MRN: [id number]    Date: 4/20/2023        1. Do you have any new issues?   no         2. Do you need any refills on medications?    no    3. Have you had any imaging done since your last visit?   no    4. Have you been hospitalized or seen in the emergency room since your last visit here?   no    5. Did the patient have a depression screening completed today?  No    No data recorded     PHQ-9 Given to (if applicable):               PHQ-9 Score (if applicable):                     [] Positive     []  Negative              Does question #9 need addressed (if applicable)                     [] Yes    []  No               Tj Palacio MA

## 2023-05-09 ENCOUNTER — CLINICAL DOCUMENTATION (OUTPATIENT)
Facility: HOSPITAL | Age: 68
End: 2023-05-09

## 2023-05-09 NOTE — PROGRESS NOTES
Patient Assistance    Met with: Patient via Telephone    Navigator Type: Oral  Documentation Type: Assistance Review  Contact Type: Telephone  Navigation Status: Free Drug Enrollment  Status of Patient Insurance Coverage: Patient has active coverage        Additional notes: Patient has been approved free Revlimid through [employer] because she is currently not eligible for any grants at this time. Approval from 5/9/23-12/31/23, she is aware we will have to look for additional assistance at the start of the new year. She has been instructed to contact Rx by [facility] by Friday if she has not received a call to schedule shipment. Nurse Navigator was asked to send a script and [facility]PL is to route to the corresponding [address] [place] [address].      Form of PAP Assistance: Free Drug

## 2023-05-17 ENCOUNTER — HOSPITAL ENCOUNTER (OUTPATIENT)
Dept: WOMENS IMAGING | Age: 68
Discharge: HOME | End: 2023-05-17
Payer: MEDICARE

## 2023-05-17 ENCOUNTER — CLINICAL DOCUMENTATION (OUTPATIENT)
Dept: ONCOLOGY | Age: 68
End: 2023-05-17

## 2023-05-17 DIAGNOSIS — Z12.31 ENCOUNTER FOR SCREENING MAMMOGRAM FOR MALIGNANT NEOPLASM OF BREAST: ICD-10-CM

## 2023-05-17 DIAGNOSIS — R92.8 ABNORMAL MAMMOGRAM: Primary | ICD-10-CM

## 2023-05-17 DIAGNOSIS — C90.00 MULTIPLE MYELOMA, REMISSION STATUS UNSPECIFIED (HCC): ICD-10-CM

## 2023-05-17 PROCEDURE — 77063 BREAST TOMOSYNTHESIS BI: CPT

## 2023-05-17 RX ORDER — LENALIDOMIDE 10 MG/1
10 CAPSULE ORAL DAILY
Qty: 28 CAPSULE | Refills: 0 | Status: ACTIVE | OUTPATIENT
Start: 2023-05-17 | End: 2023-05-18 | Stop reason: SDUPTHER

## 2023-05-17 NOTE — PROGRESS NOTES
Patient had screening mammography today which revealed:    IMPRESSION:  BI-RADS category 0-[id number] additional imaging. Spot compression  views and ultrasound exam of the retroareolar/Janis areolar left breast  recommended for further evaluation of asymmetric density/mass. Per Dr. Kari Westfall - order placed for left dx mammogram - scheduled tomorrow, 5/18/23 at [facility].

## 2023-05-17 NOTE — PROGRESS NOTES
Patient has been approved for free Revlimid from [employer]. Received VM from [facility] stating they need a new RX to fill lenalidomide (revlimid). [facility]; however, unable at this time. PATRICIA Haganmalvin [address] @[phone] and spoke with liaison who was able to complete prescriber survey with this RN over the phone. New Auth # [id number]. OI e-scribed to [employer] by [facility] with auth number attached. Pharmacy will reach out to patient to arrange delivery of oral chemo.

## 2023-05-18 ENCOUNTER — HOSPITAL ENCOUNTER (OUTPATIENT)
Dept: WOMENS IMAGING | Age: 68
Discharge: HOME | End: 2023-05-18
Payer: MEDICARE

## 2023-05-18 ENCOUNTER — HOSPITAL ENCOUNTER (OUTPATIENT)
Dept: ULTRASOUND IMAGING | Age: 68
Discharge: HOME | End: 2023-05-18
Payer: MEDICARE

## 2023-05-18 DIAGNOSIS — R92.8 ABNORMAL MAMMOGRAM: ICD-10-CM

## 2023-05-18 DIAGNOSIS — C90.00 MULTIPLE MYELOMA, REMISSION STATUS UNSPECIFIED (HCC): ICD-10-CM

## 2023-05-18 PROCEDURE — 76642 ULTRASOUND BREAST LIMITED: CPT

## 2023-05-18 PROCEDURE — G0279 TOMOSYNTHESIS, MAMMO: HCPCS

## 2023-05-18 RX ORDER — LENALIDOMIDE 10 MG/1
10 CAPSULE ORAL DAILY
Qty: 28 CAPSULE | Refills: 0 | Status: ACTIVE | OUTPATIENT
Start: 2023-05-18

## 2023-05-18 NOTE — PROGRESS NOTES
Received VM from patient stating that she received a call from Specialty Pharmacy to reach out to this RN. Patient to receive free drug through [employer]. RX that was e-scribed by this RN to [employer] by Thais Arroyo on 05/16/2023 not received. Checked RX and was rerouted to [facility] per Maury. Lalitha [address] @ [phone] to notify. Advised to resend RX and liaison will make sure not to reroute to another pharmacy. Called patient back @ [phone] to update. Patient aware that Specialty Pharmacy will be contacting patient to schedule delivery. Patient instructed to call this RN back if pharmacy does not reach out to her by tomorrow 05/19/2023. Voices understanding.  This RN direct number provided should a

## 2023-06-08 DIAGNOSIS — C90.00 MULTIPLE MYELOMA, REMISSION STATUS UNSPECIFIED (HCC): ICD-10-CM

## 2023-06-08 RX ORDER — LENALIDOMIDE 10 MG/1
10 CAPSULE ORAL DAILY
Qty: 28 CAPSULE | Refills: 0 | Status: ACTIVE | OUTPATIENT
Start: 2023-06-08

## 2023-06-25 DIAGNOSIS — M81.0 OSTEOPOROSIS, UNSPECIFIED OSTEOPOROSIS TYPE, UNSPECIFIED PATHOLOGICAL FRACTURE PRESENCE: ICD-10-CM

## 2023-06-26 RX ORDER — ALENDRONATE SODIUM 70 MG/1
TABLET ORAL
Qty: 4 TABLET | Refills: 11 | Status: SHIPPED | OUTPATIENT
Start: 2023-06-26

## 2023-07-14 DIAGNOSIS — C90.00 MULTIPLE MYELOMA, REMISSION STATUS UNSPECIFIED (HCC): ICD-10-CM

## 2023-07-14 RX ORDER — LENALIDOMIDE 10 MG/1
10 CAPSULE ORAL DAILY
Qty: 28 CAPSULE | Refills: 0 | Status: ACTIVE | OUTPATIENT
Start: 2023-07-14

## 2023-07-14 NOTE — PROGRESS NOTES
Revlimid 10 mg chemo capsules #28 reordered and e-scribed to [facility]. Prescriber survey completed and new auth # [id number] obtained through [employer] portal. Auth valid for 30 days and attached to RX.

## 2023-07-20 ENCOUNTER — HOSPITAL ENCOUNTER (OUTPATIENT)
Dept: INFUSION THERAPY | Age: 68
Discharge: HOME | End: 2023-07-20
Payer: MEDICARE

## 2023-07-20 DIAGNOSIS — C90.00 MULTIPLE MYELOMA NOT HAVING ACHIEVED REMISSION (HCC): ICD-10-CM

## 2023-07-20 LAB
ALBUMIN SERPL-MCNC: 4 GM/DL (ref 3.4–5)
ALP BLD-CCNC: 49 IU/L (ref 40–129)
ALT SERPL-CCNC: 25 U/L (ref 10–40)
ANION GAP SERPL CALCULATED.3IONS-SCNC: 9 MMOL/L (ref 4–16)
AST SERPL-CCNC: 23 IU/L (ref 15–37)
BASOPHILS ABSOLUTE: 0 K/CU MM
BASOPHILS RELATIVE PERCENT: 1.5 % (ref 0–1)
BILIRUB SERPL-MCNC: 0.5 MG/DL (ref 0–1)
BUN SERPL-MCNC: 14 MG/DL (ref 6–23)
CALCIUM SERPL-MCNC: 8.8 MG/DL (ref 8.3–10.6)
CHLORIDE BLD-SCNC: 103 MMOL/L (ref 99–110)
CO2: 26 MMOL/L (ref 21–32)
CREAT SERPL-MCNC: 1 MG/DL (ref 0.6–1.1)
DIFFERENTIAL TYPE: ABNORMAL
EOSINOPHILS ABSOLUTE: 0.1 K/CU MM
EOSINOPHILS RELATIVE PERCENT: 3.9 % (ref 0–3)
ERYTHROCYTE SEDIMENTATION RATE: 1 MM/HR (ref 0–30)
GFR SERPL CREATININE-BSD FRML MDRD: >60 ML/MIN/1.73M2
GLUCOSE SERPL-MCNC: 89 MG/DL (ref 70–99)
HCT VFR BLD CALC: 38.2 % (ref 37–47)
HEMOGLOBIN: 12.9 GM/DL (ref 12.5–16)
IGA: 182 MG/DL (ref 69–382)
IGG,SERUM: 1056 MG/DL (ref 723–1685)
IGM,SERUM: 30 MG/DL (ref 62–277)
LACTATE DEHYDROGENASE: 138 IU/L (ref 120–246)
LYMPHOCYTES ABSOLUTE: 0.4 K/CU MM
LYMPHOCYTES RELATIVE PERCENT: 21.4 % (ref 24–44)
MCH RBC QN AUTO: 31.9 PG (ref 27–31)
MCHC RBC AUTO-ENTMCNC: 33.8 % (ref 32–36)
MCV RBC AUTO: 94.3 FL (ref 78–100)
MONOCYTES ABSOLUTE: 0.4 K/CU MM
MONOCYTES RELATIVE PERCENT: 18 % (ref 0–4)
PDW BLD-RTO: 14.6 % (ref 11.7–14.9)
PLATELET # BLD: 170 K/CU MM (ref 140–440)
PMV BLD AUTO: 11.1 FL (ref 7.5–11.1)
POTASSIUM SERPL-SCNC: 3.9 MMOL/L (ref 3.5–5.1)
RBC # BLD: 4.05 M/CU MM (ref 4.2–5.4)
SEGMENTED NEUTROPHILS ABSOLUTE COUNT: 1.1 K/CU MM
SEGMENTED NEUTROPHILS RELATIVE PERCENT: 55.2 % (ref 36–66)
SODIUM BLD-SCNC: 138 MMOL/L (ref 135–145)
TOTAL PROTEIN: 6 GM/DL (ref 6.4–8.2)
WBC # BLD: 2.1 K/CU MM (ref 4–10.5)

## 2023-07-20 PROCEDURE — 82784 ASSAY IGA/IGD/IGG/IGM EACH: CPT

## 2023-07-20 PROCEDURE — 83883 ASSAY NEPHELOMETRY NOT SPEC: CPT

## 2023-07-20 PROCEDURE — 84155 ASSAY OF PROTEIN SERUM: CPT

## 2023-07-20 PROCEDURE — 86320 SERUM IMMUNOELECTROPHORESIS: CPT

## 2023-07-20 PROCEDURE — 84165 PROTEIN E-PHORESIS SERUM: CPT

## 2023-07-20 PROCEDURE — 85025 COMPLETE CBC W/AUTO DIFF WBC: CPT

## 2023-07-20 PROCEDURE — 82232 ASSAY OF BETA-2 PROTEIN: CPT

## 2023-07-20 PROCEDURE — 36415 COLL VENOUS BLD VENIPUNCTURE: CPT

## 2023-07-20 PROCEDURE — 80053 COMPREHEN METABOLIC PANEL: CPT

## 2023-07-20 PROCEDURE — 83615 LACTATE (LD) (LDH) ENZYME: CPT

## 2023-07-20 PROCEDURE — 85652 RBC SED RATE AUTOMATED: CPT

## 2023-07-21 LAB
ALBUMIN SERPL ELPH-MCNC: 3.3 GM/DL (ref 3.2–5.6)
ALPHA-1-GLOBULIN: 0.3 GM/DL (ref 0.1–0.4)
ALPHA-2-GLOBULIN: 0.6 GM/DL (ref 0.4–1.2)
B2 MICROGLOB SERPL-MCNC: 2.6 MG/L
BETA GLOBULIN: 0.9 GM/DL (ref 0.5–1.3)
GAMMA GLOBULIN: 1 GM/DL (ref 0.5–1.6)
TOTAL PROTEIN: 6 GM/DL (ref 6.4–8.2)

## 2023-07-22 LAB
KAPPA LC FREE SER-MCNC: 36.56 MG/L (ref 3.3–19.4)
KAPPA LC FREE/LAMBDA FREE SER NEPH: 1.39 {RATIO} (ref 0.26–1.65)
LAMBDA LC FREE SERPL-MCNC: 26.24 MG/L (ref 5.71–26.3)

## 2023-07-24 LAB
ALBUMIN SERPL ELPH-MCNC: 3.3 GM/DL (ref 3.2–5.6)
ALPHA-1-GLOBULIN: 0.3 GM/DL (ref 0.1–0.4)
ALPHA-2-GLOBULIN: 0.6 GM/DL (ref 0.4–1.2)
BETA GLOBULIN: 0.9 GM/DL (ref 0.5–1.3)
GAMMA GLOBULIN: 1 GM/DL (ref 0.5–1.6)
SPEP INTERPRETATION: ABNORMAL
SPEP INTERPRETATION: NORMAL
TOTAL PROTEIN: 6 GM/DL (ref 6.4–8.2)

## 2023-07-25 SDOH — ECONOMIC STABILITY: TRANSPORTATION INSECURITY
IN THE PAST 12 MONTHS, HAS LACK OF TRANSPORTATION KEPT YOU FROM MEETINGS, WORK, OR FROM GETTING THINGS NEEDED FOR DAILY LIVING?: NO

## 2023-07-25 SDOH — ECONOMIC STABILITY: FOOD INSECURITY: WITHIN THE PAST 12 MONTHS, YOU WORRIED THAT YOUR FOOD WOULD RUN OUT BEFORE YOU GOT MONEY TO BUY MORE.: NEVER TRUE

## 2023-07-25 SDOH — ECONOMIC STABILITY: FOOD INSECURITY: WITHIN THE PAST 12 MONTHS, THE FOOD YOU BOUGHT JUST DIDN'T LAST AND YOU DIDN'T HAVE MONEY TO GET MORE.: NEVER TRUE

## 2023-07-25 SDOH — ECONOMIC STABILITY: INCOME INSECURITY: HOW HARD IS IT FOR YOU TO PAY FOR THE VERY BASICS LIKE FOOD, HOUSING, MEDICAL CARE, AND HEATING?: NOT HARD AT ALL

## 2023-07-25 SDOH — ECONOMIC STABILITY: HOUSING INSECURITY
IN THE LAST 12 MONTHS, WAS THERE A TIME WHEN YOU DID NOT HAVE A STEADY PLACE TO SLEEP OR SLEPT IN A SHELTER (INCLUDING NOW)?: NO

## 2023-07-26 ENCOUNTER — OFFICE VISIT (OUTPATIENT)
Dept: INTERNAL MEDICINE CLINIC | Age: 68
End: 2023-07-26
Payer: MEDICARE

## 2023-07-26 VITALS
RESPIRATION RATE: 14 BRPM | OXYGEN SATURATION: 98 % | WEIGHT: 152.2 LBS | BODY MASS INDEX: 26.13 KG/M2 | SYSTOLIC BLOOD PRESSURE: 136 MMHG | DIASTOLIC BLOOD PRESSURE: 72 MMHG | HEART RATE: 53 BPM

## 2023-07-26 DIAGNOSIS — I10 PRIMARY HYPERTENSION: ICD-10-CM

## 2023-07-26 DIAGNOSIS — I26.94 MULTIPLE SUBSEGMENTAL PULMONARY EMBOLI WITHOUT ACUTE COR PULMONALE (HCC): ICD-10-CM

## 2023-07-26 DIAGNOSIS — G62.0 DRUG-INDUCED POLYNEUROPATHY (HCC): ICD-10-CM

## 2023-07-26 DIAGNOSIS — C90.00 MULTIPLE MYELOMA NOT HAVING ACHIEVED REMISSION (HCC): Primary | ICD-10-CM

## 2023-07-26 PROCEDURE — 99214 OFFICE O/P EST MOD 30 MIN: CPT | Performed by: INTERNAL MEDICINE

## 2023-07-26 PROCEDURE — 1123F ACP DISCUSS/DSCN MKR DOCD: CPT | Performed by: INTERNAL MEDICINE

## 2023-07-26 PROCEDURE — 3075F SYST BP GE 130 - 139MM HG: CPT | Performed by: INTERNAL MEDICINE

## 2023-07-26 PROCEDURE — 3078F DIAST BP <80 MM HG: CPT | Performed by: INTERNAL MEDICINE

## 2023-07-27 ENCOUNTER — OFFICE VISIT (OUTPATIENT)
Dept: ONCOLOGY | Age: 68
End: 2023-07-27
Payer: MEDICARE

## 2023-07-27 ENCOUNTER — HOSPITAL ENCOUNTER (OUTPATIENT)
Dept: INFUSION THERAPY | Age: 68
Discharge: HOME | End: 2023-07-27
Payer: MEDICARE

## 2023-07-27 VITALS
RESPIRATION RATE: 14 BRPM | WEIGHT: 151.2 LBS | OXYGEN SATURATION: 98 % | HEIGHT: 64 IN | HEART RATE: 54 BPM | TEMPERATURE: 97.1 F | SYSTOLIC BLOOD PRESSURE: 138 MMHG | BODY MASS INDEX: 25.81 KG/M2 | DIASTOLIC BLOOD PRESSURE: 64 MMHG

## 2023-07-27 DIAGNOSIS — C90.00 MULTIPLE MYELOMA NOT HAVING ACHIEVED REMISSION (HCC): Primary | ICD-10-CM

## 2023-07-27 PROCEDURE — 1123F ACP DISCUSS/DSCN MKR DOCD: CPT | Performed by: INTERNAL MEDICINE

## 2023-07-27 PROCEDURE — 3078F DIAST BP <80 MM HG: CPT | Performed by: INTERNAL MEDICINE

## 2023-07-27 PROCEDURE — 3075F SYST BP GE 130 - 139MM HG: CPT | Performed by: INTERNAL MEDICINE

## 2023-07-27 PROCEDURE — 99211 OFF/OP EST MAY X REQ PHY/QHP: CPT

## 2023-07-27 PROCEDURE — 99214 OFFICE O/P EST MOD 30 MIN: CPT | Performed by: INTERNAL MEDICINE

## 2023-07-27 NOTE — PROGRESS NOTES
MA Rooming Questions  Patient: Rodrigo Thapa  MRN: [id number]    Date: 7/27/2023        1. Do you have any new issues?   no         2. Do you need any refills on medications?    no    3. Have you had any imaging done since your last visit? yes - mammo & u/s 5/18    4. Have you been hospitalized or seen in the emergency room since your last visit here?   no    5. Did the patient have a depression screening completed today?  No    No data recorded     PHQ-9 Given to (if applicable):               PHQ-9 Score (if applicable):                     [] Positive     []  Negative              Does question #9 need addressed (if applicable)                     [] Yes    []  No               Ed Lee MA
10/23/2020    KLFLCR 1.39 07/20/2023     Lab Results   Component Value Date    B2M 2.6 07/20/2023     Coagulation Panel:  No results found for: PROTIME, INR, APTT, DDIMER  Anemia Panel:  No results found for: XNUAKEWP28, FOLATE  Tumor Markers:  No results found for: , CEA, , LABCA2, PSA  Observations:  No data recorded        Assessment:   1. Symptomatic IgG Kappa multiple myeloma. 2.         Normocytic normochromic anemia - due to multiple myeloma. Plan:  Ms. Florence Fitzgerald has been followed for IgG Kappa multiple myeloma. On July 27, 2023, she presented to me for followup. I have been following Ms. Serrano for IgG Kappa multiple myeloma and she is currently on maintenance chemotherapy with Revlimid (10 mg daily) since December 6, 2018. She is here for close monitoring of side effects and toxicity from chemotherapy. She is tolerating revlimid very well and she doesn't encounter any major side effects from it. I recognize that she has normal SPEP and BRENT on 7/20/23. It was normal on 4/13/23, normal on 1/19/23, 10/21/22, probable faint M protein on 7/20/22, faint IgG kappa only on 4/13/22, faint IgG kappa only on 1/18/22, too small to measure on 10/15/21, 200 mg/dl on 7/22/21, 300 mg/dl on 4/22/21, 200mg/dl on 1/22/21, 200 mg/dl on October 23, 2020, very small to measure on [facility] on 7/20/20, 200 mg/dl on 4/22/2020, 200 mg/dl on 1/21/20, 430 mg/dl on 10/23/19, 200 mg on 7/23/19,  400mg on 4/23/19, 1000mg on  1/22/19 and 2100 mg/dL on blood test done on November 20, 2018. I believe she is responding very well to Revlimid and I recommend her to continue with that for now. I will see her again in three months and I will repeat all the blood tests again a week before next appointment. Chemo induced muscle cramps - recommend her to use magnesium oxide prn. If needed, I will prescribe muscle relaxant. Chemo induced diarrhea - mild symptom only. Recommend to take imodium prn.      She has

## 2023-08-15 DIAGNOSIS — C90.00 MULTIPLE MYELOMA, REMISSION STATUS UNSPECIFIED (HCC): ICD-10-CM

## 2023-08-15 NOTE — TELEPHONE ENCOUNTER
Patient contacted our office in need of refill for REVLAMID. Patient has a scheduled appointment on 10/25. Patients preferred pharmacy is [facility].  Pending for patients chart provider CESAR KING .

## 2023-08-16 RX ORDER — LENALIDOMIDE 10 MG/1
10 CAPSULE ORAL DAILY
Qty: 28 CAPSULE | Refills: 0 | Status: ACTIVE | OUTPATIENT
Start: 2023-08-16

## 2023-09-13 DIAGNOSIS — C90.00 MULTIPLE MYELOMA, REMISSION STATUS UNSPECIFIED (HCC): ICD-10-CM

## 2023-09-13 RX ORDER — LENALIDOMIDE 10 MG/1
10 CAPSULE ORAL DAILY
Qty: 28 CAPSULE | Refills: 0 | Status: ACTIVE | OUTPATIENT
Start: 2023-09-13 | End: 2023-09-14 | Stop reason: SDUPTHER

## 2023-09-13 NOTE — PROGRESS NOTES
Lenalidomide (Revlimid) 10 mg chemo capsules reordered and e-scribed to [facility]. Prescriber survey completed and new auth #  [id number] obtained through [employer] portal. Auth valid for 30 days and attached to RX.

## 2023-09-14 DIAGNOSIS — C90.00 MULTIPLE MYELOMA, REMISSION STATUS UNSPECIFIED (HCC): ICD-10-CM

## 2023-09-14 RX ORDER — LENALIDOMIDE 10 MG/1
10 CAPSULE ORAL DAILY
Qty: 28 CAPSULE | Refills: 0 | Status: ACTIVE | OUTPATIENT
Start: 2023-09-14

## 2023-10-12 DIAGNOSIS — C90.00 MULTIPLE MYELOMA, REMISSION STATUS UNSPECIFIED (HCC): ICD-10-CM

## 2023-10-12 RX ORDER — LENALIDOMIDE 10 MG/1
10 CAPSULE ORAL DAILY
Qty: 28 CAPSULE | Refills: 0 | Status: ACTIVE | OUTPATIENT
Start: 2023-10-12

## 2023-10-12 NOTE — PROGRESS NOTES
Received VM from patient requesting refill on oral chemo. Revlimid 10 mg chemo capsules reordered and sent to [facility] by [facility] per physician order. Prescriber survey completed and new auth # [id number] obtained through [employer] portal. Auth valid for 30 days and attached to RX.

## 2023-10-18 ENCOUNTER — HOSPITAL ENCOUNTER (OUTPATIENT)
Dept: INFUSION THERAPY | Age: 68
Discharge: HOME | End: 2023-10-18
Payer: MEDICARE

## 2023-10-18 DIAGNOSIS — C90.00 MULTIPLE MYELOMA NOT HAVING ACHIEVED REMISSION (HCC): ICD-10-CM

## 2023-10-18 LAB
ALBUMIN SERPL-MCNC: 4 GM/DL (ref 3.4–5)
ALP BLD-CCNC: 56 IU/L (ref 40–129)
ALT SERPL-CCNC: 23 U/L (ref 10–40)
ANION GAP SERPL CALCULATED.3IONS-SCNC: 7 MMOL/L (ref 4–16)
AST SERPL-CCNC: 20 IU/L (ref 15–37)
BASOPHILS ABSOLUTE: 0 K/CU MM
BASOPHILS RELATIVE PERCENT: 0.9 % (ref 0–1)
BILIRUB SERPL-MCNC: 0.5 MG/DL (ref 0–1)
BUN SERPL-MCNC: 11 MG/DL (ref 6–23)
CALCIUM SERPL-MCNC: 8.7 MG/DL (ref 8.3–10.6)
CHLORIDE BLD-SCNC: 100 MMOL/L (ref 99–110)
CO2: 29 MMOL/L (ref 21–32)
CREAT SERPL-MCNC: 1 MG/DL (ref 0.6–1.1)
DIFFERENTIAL TYPE: ABNORMAL
EOSINOPHILS ABSOLUTE: 0.2 K/CU MM
EOSINOPHILS RELATIVE PERCENT: 7.5 % (ref 0–3)
ERYTHROCYTE SEDIMENTATION RATE: 1 MM/HR (ref 0–30)
GFR SERPL CREATININE-BSD FRML MDRD: >60 ML/MIN/1.73M2
GLUCOSE SERPL-MCNC: 84 MG/DL (ref 70–99)
HCT VFR BLD CALC: 37.8 % (ref 37–47)
HEMOGLOBIN: 12.7 GM/DL (ref 12.5–16)
IGA: 187 MG/DL (ref 69–382)
IGG,SERUM: 1099 MG/DL (ref 723–1685)
IGM,SERUM: 35 MG/DL (ref 62–277)
LACTATE DEHYDROGENASE: 143 IU/L (ref 120–246)
LYMPHOCYTES ABSOLUTE: 0.4 K/CU MM
LYMPHOCYTES RELATIVE PERCENT: 19.2 % (ref 24–44)
MCH RBC QN AUTO: 32.1 PG (ref 27–31)
MCHC RBC AUTO-ENTMCNC: 33.6 % (ref 32–36)
MCV RBC AUTO: 95.5 FL (ref 78–100)
MONOCYTES ABSOLUTE: 0.4 K/CU MM
MONOCYTES RELATIVE PERCENT: 17.8 % (ref 0–4)
PDW BLD-RTO: 15 % (ref 11.7–14.9)
PLATELET # BLD: 184 K/CU MM (ref 140–440)
PMV BLD AUTO: 11.4 FL (ref 7.5–11.1)
POTASSIUM SERPL-SCNC: 3.7 MMOL/L (ref 3.5–5.1)
RBC # BLD: 3.96 M/CU MM (ref 4.2–5.4)
SEGMENTED NEUTROPHILS ABSOLUTE COUNT: 1.2 K/CU MM
SEGMENTED NEUTROPHILS RELATIVE PERCENT: 54.6 % (ref 36–66)
SODIUM BLD-SCNC: 136 MMOL/L (ref 135–145)
TOTAL PROTEIN: 6.4 GM/DL (ref 6.4–8.2)
WBC # BLD: 2.1 K/CU MM (ref 4–10.5)

## 2023-10-18 PROCEDURE — 83883 ASSAY NEPHELOMETRY NOT SPEC: CPT

## 2023-10-18 PROCEDURE — 84155 ASSAY OF PROTEIN SERUM: CPT

## 2023-10-18 PROCEDURE — 84165 PROTEIN E-PHORESIS SERUM: CPT

## 2023-10-18 PROCEDURE — 80053 COMPREHEN METABOLIC PANEL: CPT

## 2023-10-18 PROCEDURE — 82232 ASSAY OF BETA-2 PROTEIN: CPT

## 2023-10-18 PROCEDURE — 82784 ASSAY IGA/IGD/IGG/IGM EACH: CPT

## 2023-10-18 PROCEDURE — 86320 SERUM IMMUNOELECTROPHORESIS: CPT

## 2023-10-18 PROCEDURE — 36415 COLL VENOUS BLD VENIPUNCTURE: CPT

## 2023-10-18 PROCEDURE — 85652 RBC SED RATE AUTOMATED: CPT

## 2023-10-18 PROCEDURE — 85025 COMPLETE CBC W/AUTO DIFF WBC: CPT

## 2023-10-18 PROCEDURE — 83615 LACTATE (LD) (LDH) ENZYME: CPT

## 2023-10-19 LAB
ALBUMIN SERPL ELPH-MCNC: 3.4 GM/DL (ref 3.2–5.6)
ALPHA-1-GLOBULIN: 0.3 GM/DL (ref 0.1–0.4)
ALPHA-2-GLOBULIN: 0.6 GM/DL (ref 0.4–1.2)
BETA GLOBULIN: 1 GM/DL (ref 0.5–1.3)
GAMMA GLOBULIN: 1.1 GM/DL (ref 0.5–1.6)
SPEP INTERPRETATION: NORMAL
SPEP INTERPRETATION: NORMAL
TOTAL PROTEIN: 6.4 GM/DL (ref 6.4–8.2)

## 2023-10-20 LAB
B2 MICROGLOB SERPL-MCNC: 2.3 MG/L
KAPPA LC FREE SER-MCNC: 44.54 MG/L (ref 3.3–19.4)
KAPPA LC FREE/LAMBDA FREE SER NEPH: 1.46 {RATIO} (ref 0.26–1.65)
LAMBDA LC FREE SERPL-MCNC: 30.42 MG/L (ref 5.71–26.3)

## 2023-10-25 ENCOUNTER — OFFICE VISIT (OUTPATIENT)
Dept: ONCOLOGY | Age: 68
End: 2023-10-25
Payer: MEDICARE

## 2023-10-25 ENCOUNTER — HOSPITAL ENCOUNTER (OUTPATIENT)
Dept: INFUSION THERAPY | Age: 68
Discharge: HOME | End: 2023-10-25
Payer: MEDICARE

## 2023-10-25 VITALS
WEIGHT: 151.8 LBS | SYSTOLIC BLOOD PRESSURE: 144 MMHG | HEIGHT: 64 IN | HEART RATE: 56 BPM | TEMPERATURE: 97.9 F | DIASTOLIC BLOOD PRESSURE: 67 MMHG | BODY MASS INDEX: 25.92 KG/M2 | OXYGEN SATURATION: 98 %

## 2023-10-25 DIAGNOSIS — C90.00 MULTIPLE MYELOMA NOT HAVING ACHIEVED REMISSION (HCC): Primary | ICD-10-CM

## 2023-10-25 PROCEDURE — 1123F ACP DISCUSS/DSCN MKR DOCD: CPT | Performed by: INTERNAL MEDICINE

## 2023-10-25 PROCEDURE — 3078F DIAST BP <80 MM HG: CPT | Performed by: INTERNAL MEDICINE

## 2023-10-25 PROCEDURE — 99214 OFFICE O/P EST MOD 30 MIN: CPT | Performed by: INTERNAL MEDICINE

## 2023-10-25 PROCEDURE — 99211 OFF/OP EST MAY X REQ PHY/QHP: CPT

## 2023-10-25 PROCEDURE — 3077F SYST BP >= 140 MM HG: CPT | Performed by: INTERNAL MEDICINE

## 2023-10-25 ASSESSMENT — PATIENT HEALTH QUESTIONNAIRE - PHQ9
1. LITTLE INTEREST OR PLEASURE IN DOING THINGS: 0
SUM OF ALL RESPONSES TO PHQ QUESTIONS 1-9: 0
2. FEELING DOWN, DEPRESSED OR HOPELESS: 0
SUM OF ALL RESPONSES TO PHQ QUESTIONS 1-9: 0
SUM OF ALL RESPONSES TO PHQ QUESTIONS 1-9: 0
SUM OF ALL RESPONSES TO PHQ9 QUESTIONS 1 & 2: 0
SUM OF ALL RESPONSES TO PHQ QUESTIONS 1-9: 0

## 2023-10-25 NOTE — PROGRESS NOTES
MA Rooming Questions  Patient: Gerda Bridges  MRN: [id number]    Date: 10/25/2023        1. Do you have any new issues?   no         2. Do you need any refills on medications?    no    3. Have you had any imaging done since your last visit? yes - labs 10/18    4. Have you been hospitalized or seen in the emergency room since your last visit here?   no    5. Did the patient have a depression screening completed today?  Yes    PHQ-9 Total Score: 0 (10/25/2023  9:13 AM)       PHQ-9 Given to (if applicable):               PHQ-9 Score (if applicable):                     [] Positive     []  Negative              Does question #9 need addressed (if applicable)                     [] Yes    []  No               Justo Gosselin, CMA
10/18/2023    GAMGLOB 1.1 10/18/2023     Lab Results   Component Value Date    KAPPAUVOL 44.54 (H) 10/18/2023    LAMBDAUVOL 24.98 10/23/2020    KLFLCR 1.46 10/18/2023     Lab Results   Component Value Date    B2M 2.3 10/18/2023     Coagulation Panel:  No results found for: \"PROTIME\", \"INR\", \"APTT\", \"DDIMER\"  Anemia Panel:  No results found for: \"HDJRSZDU32\", \"FOLATE\"  Tumor Markers:  No results found for: \"\", \"CEA\", \"\", \"LABCA2\", \"PSA\"  Observations:  PHQ-9 Total Score: 0 (10/25/2023  9:13 AM)          Assessment:   1. Symptomatic IgG Kappa multiple myeloma. 2.         Normocytic normochromic anemia - due to multiple myeloma. Plan:  Ms. Teo Castaneda has been followed for IgG Kappa multiple myeloma. Mammogram done on 5/18/23 showed no evidence of malignancy. On October 25, 2023, she presented to me for followup. I have been following Ms. Serrano for IgG Kappa multiple myeloma and she is currently on maintenance chemotherapy with Revlimid (10 mg daily) since December 6, 2018. She is here for close monitoring of side effects and toxicity from chemotherapy. She is tolerating revlimid very well and she doesn't encounter any major side effects from it. I recognize that she has normal SPEP and BRENT on 10/18/23. IT was normal on 7/20/23, normal on 4/13/23, normal on 1/19/23, 10/21/22, probable faint M protein on 7/20/22, faint IgG kappa only on 4/13/22, faint IgG kappa only on 1/18/22, too small to measure on 10/15/21, 200 mg/dl on 7/22/21, 300 mg/dl on 4/22/21, 200mg/dl on 1/22/21, 200 mg/dl on October 23, 2020, very small to measure on [facility] on 7/20/20, 200 mg/dl on 4/22/2020, 200 mg/dl on 1/21/20, 430 mg/dl on 10/23/19, 200 mg on 7/23/19,  400mg on 4/23/19, 1000mg on  1/22/19 and 2100 mg/dL on blood test done on November 20, 2018. I believe she is responding very well to Revlimid and I recommend her to continue with that for now.  I will see her again in three months and I will repeat

## 2023-11-10 ENCOUNTER — CLINICAL DOCUMENTATION (OUTPATIENT)
Dept: ONCOLOGY | Age: 68
End: 2023-11-10

## 2023-11-10 DIAGNOSIS — C90.00 MULTIPLE MYELOMA, REMISSION STATUS UNSPECIFIED (HCC): ICD-10-CM

## 2023-11-10 RX ORDER — LENALIDOMIDE 10 MG/1
10 CAPSULE ORAL DAILY
Qty: 28 CAPSULE | Refills: 0 | Status: ACTIVE | OUTPATIENT
Start: 2023-11-10

## 2023-11-10 NOTE — PROGRESS NOTES
Revlimid 10 mg chemo capsules reordered and sent to [facility]  by [facility]. Prescriber survey completed and new auth # [id number] obtained through [employer] portal. Auth valid for 30 days and attached to RX.

## 2023-11-27 ENCOUNTER — CLINICAL DOCUMENTATION (OUTPATIENT)
Facility: HOSPITAL | Age: 68
End: 2023-11-27

## 2023-11-27 NOTE — PROGRESS NOTES
I introduced myself to Han Valladares today. I informed her that I am working on her re-enrollment application for Revlimid provided by [employer]. I went over her insurance and income. I sent the application to Dr. Wade Friends office. She is going to stop the office tomorrow to sign the application. I will call her once I hear back from [employer].     Ki Madrigal  [phone]

## 2023-12-11 ENCOUNTER — TELEPHONE (OUTPATIENT)
Dept: ONCOLOGY | Age: 68
End: 2023-12-11

## 2023-12-11 DIAGNOSIS — C90.00 MULTIPLE MYELOMA, REMISSION STATUS UNSPECIFIED (HCC): ICD-10-CM

## 2023-12-11 RX ORDER — LENALIDOMIDE 10 MG/1
10 CAPSULE ORAL DAILY
Qty: 28 CAPSULE | Refills: 0 | Status: ACTIVE | OUTPATIENT
Start: 2023-12-11

## 2023-12-11 NOTE — PROGRESS NOTES
Received VM from patient needing refill on oral chemo. Revlimid 10 mg chemo capsules reordered and sent to [email or web address]. Prescriber survey completed and new auth # [id number]  obtained through [employer] portal. Auth valid for 30 days and attached to RX. Attempted to call patient back @ [phone] to update; however, no answer. VM left with this RN direct number should patient have any questions.

## 2023-12-11 NOTE — TELEPHONE ENCOUNTER
Patient called in stating she needed a refill on her atenolol I returned call but reached voicemail. I informed patient that her blood pressure medication should be filled by her PCP as he is currently prescribing other BP medications so that it could be monitored closer. Left direct call back number if there is any additional questions or concerns.

## 2023-12-13 DIAGNOSIS — I10 ESSENTIAL HYPERTENSION: ICD-10-CM

## 2023-12-13 RX ORDER — ATENOLOL 50 MG/1
TABLET ORAL
Qty: 90 TABLET | Refills: 2 | Status: SHIPPED | OUTPATIENT
Start: 2023-12-13

## 2024-01-09 DIAGNOSIS — I10 ESSENTIAL HYPERTENSION: ICD-10-CM

## 2024-01-09 DIAGNOSIS — C90.00 MULTIPLE MYELOMA, REMISSION STATUS UNSPECIFIED (HCC): ICD-10-CM

## 2024-01-09 RX ORDER — AMLODIPINE BESYLATE 2.5 MG/1
TABLET ORAL
Qty: 90 TABLET | Refills: 3 | Status: SHIPPED | OUTPATIENT
Start: 2024-01-09

## 2024-01-09 RX ORDER — LENALIDOMIDE 10 MG/1
10 CAPSULE ORAL DAILY
Qty: 28 CAPSULE | Refills: 0 | Status: ACTIVE | OUTPATIENT
Start: 2024-01-09

## 2024-01-09 NOTE — PROGRESS NOTES
Received VM from patient needing refill on oral chemo. Revlimid 10 mg chemo capsules reordered and e-scribed to [facility]. Prescriber survey completed and new auth # [id number] obtained through [employer] portal. Auth valid for 30 days and attached to RX.

## 2024-01-10 ENCOUNTER — CLINICAL DOCUMENTATION (OUTPATIENT)
Dept: ONCOLOGY | Age: 69
End: 2024-01-10

## 2024-01-10 NOTE — PROGRESS NOTES
This nurse called [facility] and spoke with Mirian AREVALO. No clarification needed, however she did inform this nurse that lenalidomide 10 mg is on backorder for the next month or so. Verbal order provided to change RX to ELENA 1 if insurance will allow.

## 2024-01-18 ENCOUNTER — CLINICAL DOCUMENTATION (OUTPATIENT)
Dept: ONCOLOGY | Age: 69
End: 2024-01-18

## 2024-01-18 ENCOUNTER — TELEPHONE (OUTPATIENT)
Dept: ONCOLOGY | Age: 69
End: 2024-01-18

## 2024-01-18 NOTE — PROGRESS NOTES
This nurse called the patient  @ [phone] to advise her that her medication will be shipped tomorrow from the specialty pharmacy, however there was no answer. This nurse left a VM for the patient and this RN's direct number provided.

## 2024-01-18 NOTE — TELEPHONE ENCOUNTER
Pt lvm stating that [facility] told her they need a PA.    Bactrim Pregnancy And Lactation Text: This medication is Pregnancy Category D and is known to cause fetal risk.  It is also excreted in breast milk.

## 2024-01-18 NOTE — TELEPHONE ENCOUNTER
This nurse called the patient @ [phone] and advised her that a PA was started for the generic Revlimid however it is on short supply at this time. A new PA request has been started by the specialty pharmacy team for the Brand name. This nurse advised that the patient will be notified of any updates when this office receives them. The patient verbalized understanding and denies any further needs or concerns.

## 2024-01-24 ENCOUNTER — OFFICE VISIT (OUTPATIENT)
Dept: INTERNAL MEDICINE CLINIC | Age: 69
End: 2024-01-24
Payer: MEDICARE

## 2024-01-24 VITALS
SYSTOLIC BLOOD PRESSURE: 126 MMHG | DIASTOLIC BLOOD PRESSURE: 70 MMHG | BODY MASS INDEX: 25.81 KG/M2 | HEART RATE: 65 BPM | HEIGHT: 64 IN | WEIGHT: 151.2 LBS | OXYGEN SATURATION: 99 %

## 2024-01-24 DIAGNOSIS — I10 ESSENTIAL HYPERTENSION: Primary | ICD-10-CM

## 2024-01-24 DIAGNOSIS — C90.00 MULTIPLE MYELOMA NOT HAVING ACHIEVED REMISSION (HCC): ICD-10-CM

## 2024-01-24 DIAGNOSIS — I26.94 MULTIPLE SUBSEGMENTAL PULMONARY EMBOLI WITHOUT ACUTE COR PULMONALE (HCC): ICD-10-CM

## 2024-01-24 PROCEDURE — 3074F SYST BP LT 130 MM HG: CPT | Performed by: INTERNAL MEDICINE

## 2024-01-24 PROCEDURE — 3078F DIAST BP <80 MM HG: CPT | Performed by: INTERNAL MEDICINE

## 2024-01-24 PROCEDURE — 99213 OFFICE O/P EST LOW 20 MIN: CPT | Performed by: INTERNAL MEDICINE

## 2024-01-24 PROCEDURE — 1123F ACP DISCUSS/DSCN MKR DOCD: CPT | Performed by: INTERNAL MEDICINE

## 2024-01-24 NOTE — PROGRESS NOTES
follow-up.    Diagnoses and all orders for this visit:    Essential hypertension - at goal; reviewed recent labs; check lipids  -     Lipid Panel; Future    Multiple subsegmental pulmonary emboli without acute cor pulmonale (HCC) - cont eliquis eindefinitely    Multiple myeloma not having achieved remission (HCC) - doing well; following with onc

## 2024-01-25 ENCOUNTER — HOSPITAL ENCOUNTER (OUTPATIENT)
Age: 69
Discharge: HOME | End: 2024-01-25
Payer: MEDICARE

## 2024-01-25 ENCOUNTER — HOSPITAL ENCOUNTER (OUTPATIENT)
Dept: INFUSION THERAPY | Age: 69
Discharge: HOME | End: 2024-01-25
Payer: MEDICARE

## 2024-01-25 DIAGNOSIS — C90.00 MULTIPLE MYELOMA NOT HAVING ACHIEVED REMISSION (HCC): ICD-10-CM

## 2024-01-25 DIAGNOSIS — I10 ESSENTIAL HYPERTENSION: ICD-10-CM

## 2024-01-25 LAB
ALBUMIN SERPL-MCNC: 4.1 GM/DL (ref 3.4–5)
ALP BLD-CCNC: 55 IU/L (ref 40–129)
ALT SERPL-CCNC: 21 U/L (ref 10–40)
ANION GAP SERPL CALCULATED.3IONS-SCNC: 9 MMOL/L (ref 7–16)
AST SERPL-CCNC: 17 IU/L (ref 15–37)
BASOPHILS ABSOLUTE: 0 K/CU MM
BASOPHILS RELATIVE PERCENT: 0.5 % (ref 0–1)
BILIRUB SERPL-MCNC: 0.6 MG/DL (ref 0–1)
BUN SERPL-MCNC: 12 MG/DL (ref 6–23)
CALCIUM SERPL-MCNC: 8.5 MG/DL (ref 8.3–10.6)
CHLORIDE BLD-SCNC: 99 MMOL/L (ref 99–110)
CHOLEST SERPL-MCNC: 156 MG/DL
CO2: 27 MMOL/L (ref 21–32)
CREAT SERPL-MCNC: 1 MG/DL (ref 0.6–1.1)
DIFFERENTIAL TYPE: ABNORMAL
EOSINOPHILS ABSOLUTE: 0.1 K/CU MM
EOSINOPHILS RELATIVE PERCENT: 5.6 % (ref 0–3)
ERYTHROCYTE SEDIMENTATION RATE: 1 MM/HR (ref 0–30)
GFR SERPL CREATININE-BSD FRML MDRD: >60 ML/MIN/1.73M2
GLUCOSE SERPL-MCNC: 92 MG/DL (ref 70–99)
HCT VFR BLD CALC: 37.1 % (ref 37–47)
HDLC SERPL-MCNC: 64 MG/DL
HEMOGLOBIN: 12.6 GM/DL (ref 12.5–16)
IGA: 204 MG/DL (ref 69–382)
IGG,SERUM: 1135 MG/DL (ref 723–1685)
IGM,SERUM: 41 MG/DL (ref 62–277)
LACTATE DEHYDROGENASE: 135 IU/L (ref 120–246)
LDLC SERPL CALC-MCNC: 69 MG/DL
LYMPHOCYTES ABSOLUTE: 0.3 K/CU MM
LYMPHOCYTES RELATIVE PERCENT: 17.2 % (ref 24–44)
MCH RBC QN AUTO: 32.2 PG (ref 27–31)
MCHC RBC AUTO-ENTMCNC: 34 % (ref 32–36)
MCV RBC AUTO: 94.9 FL (ref 78–100)
MONOCYTES ABSOLUTE: 0.3 K/CU MM
MONOCYTES RELATIVE PERCENT: 13.6 % (ref 0–4)
PDW BLD-RTO: 15.6 % (ref 11.7–14.9)
PLATELET # BLD: 192 K/CU MM (ref 140–440)
PMV BLD AUTO: 10.7 FL (ref 7.5–11.1)
POTASSIUM SERPL-SCNC: 3.9 MMOL/L (ref 3.5–5.1)
RBC # BLD: 3.91 M/CU MM (ref 4.2–5.4)
SEGMENTED NEUTROPHILS ABSOLUTE COUNT: 1.3 K/CU MM
SEGMENTED NEUTROPHILS RELATIVE PERCENT: 63.1 % (ref 36–66)
SODIUM BLD-SCNC: 135 MMOL/L (ref 135–145)
TOTAL PROTEIN: 6.5 GM/DL (ref 6.4–8.2)
TOTAL PROTEIN: 6.5 GM/DL (ref 6.4–8.2)
TRIGL SERPL-MCNC: 116 MG/DL
WBC # BLD: 2 K/CU MM (ref 4–10.5)

## 2024-01-25 PROCEDURE — 86320 SERUM IMMUNOELECTROPHORESIS: CPT

## 2024-01-25 PROCEDURE — 80053 COMPREHEN METABOLIC PANEL: CPT

## 2024-01-25 PROCEDURE — 84165 PROTEIN E-PHORESIS SERUM: CPT

## 2024-01-25 PROCEDURE — 36415 COLL VENOUS BLD VENIPUNCTURE: CPT

## 2024-01-25 PROCEDURE — 85652 RBC SED RATE AUTOMATED: CPT

## 2024-01-25 PROCEDURE — 83615 LACTATE (LD) (LDH) ENZYME: CPT

## 2024-01-25 PROCEDURE — 84155 ASSAY OF PROTEIN SERUM: CPT

## 2024-01-25 PROCEDURE — 80061 LIPID PANEL: CPT

## 2024-01-25 PROCEDURE — 85025 COMPLETE CBC W/AUTO DIFF WBC: CPT

## 2024-01-25 PROCEDURE — 83883 ASSAY NEPHELOMETRY NOT SPEC: CPT

## 2024-01-25 PROCEDURE — 82784 ASSAY IGA/IGD/IGG/IGM EACH: CPT

## 2024-01-25 PROCEDURE — 82232 ASSAY OF BETA-2 PROTEIN: CPT

## 2024-01-26 LAB — B2 MICROGLOB SERPL-MCNC: 2.3 MG/L

## 2024-01-27 LAB
KAPPA LC FREE SER-MCNC: 43.2 MG/L (ref 3.3–19.4)
KAPPA LC FREE/LAMBDA FREE SER NEPH: 1.53 {RATIO} (ref 0.26–1.65)
LAMBDA LC FREE SERPL-MCNC: 28.17 MG/L (ref 5.71–26.3)

## 2024-01-30 LAB
ALBUMIN SERPL ELPH-MCNC: 3.5 GM/DL (ref 3.2–5.6)
ALPHA-1-GLOBULIN: 0.2 GM/DL (ref 0.1–0.4)
ALPHA-2-GLOBULIN: 0.6 GM/DL (ref 0.4–1.2)
BETA GLOBULIN: 0.9 GM/DL (ref 0.5–1.3)
GAMMA GLOBULIN: 1.3 GM/DL (ref 0.5–1.6)
SPEP INTERPRETATION: NORMAL
SPEP INTERPRETATION: NORMAL
TOTAL PROTEIN: 6.5 GM/DL (ref 6.4–8.2)

## 2024-02-01 ENCOUNTER — HOSPITAL ENCOUNTER (OUTPATIENT)
Dept: INFUSION THERAPY | Age: 69
Discharge: HOME | End: 2024-02-01
Payer: MEDICARE

## 2024-02-01 ENCOUNTER — HOSPITAL ENCOUNTER (OUTPATIENT)
Age: 69
Discharge: HOME | End: 2024-02-01
Payer: MEDICARE

## 2024-02-01 ENCOUNTER — HOSPITAL ENCOUNTER (OUTPATIENT)
Dept: GENERAL RADIOLOGY | Age: 69
Discharge: HOME | End: 2024-02-01
Payer: MEDICARE

## 2024-02-01 ENCOUNTER — OFFICE VISIT (OUTPATIENT)
Dept: ONCOLOGY | Age: 69
End: 2024-02-01
Payer: MEDICARE

## 2024-02-01 VITALS
SYSTOLIC BLOOD PRESSURE: 141 MMHG | HEIGHT: 64 IN | OXYGEN SATURATION: 96 % | WEIGHT: 150.8 LBS | BODY MASS INDEX: 25.74 KG/M2 | TEMPERATURE: 96.8 F | DIASTOLIC BLOOD PRESSURE: 63 MMHG | HEART RATE: 60 BPM

## 2024-02-01 DIAGNOSIS — C90.00 MULTIPLE MYELOMA NOT HAVING ACHIEVED REMISSION (HCC): ICD-10-CM

## 2024-02-01 DIAGNOSIS — C90.00 MULTIPLE MYELOMA, REMISSION STATUS UNSPECIFIED (HCC): ICD-10-CM

## 2024-02-01 DIAGNOSIS — C90.00 MULTIPLE MYELOMA, REMISSION STATUS UNSPECIFIED (HCC): Primary | ICD-10-CM

## 2024-02-01 PROCEDURE — 99211 OFF/OP EST MAY X REQ PHY/QHP: CPT

## 2024-02-01 PROCEDURE — 72100 X-RAY EXAM L-S SPINE 2/3 VWS: CPT

## 2024-02-01 PROCEDURE — 72072 X-RAY EXAM THORAC SPINE 3VWS: CPT

## 2024-02-01 PROCEDURE — 99214 OFFICE O/P EST MOD 30 MIN: CPT | Performed by: INTERNAL MEDICINE

## 2024-02-01 PROCEDURE — 72040 X-RAY EXAM NECK SPINE 2-3 VW: CPT

## 2024-02-01 PROCEDURE — 1123F ACP DISCUSS/DSCN MKR DOCD: CPT | Performed by: INTERNAL MEDICINE

## 2024-02-01 PROCEDURE — 3077F SYST BP >= 140 MM HG: CPT | Performed by: INTERNAL MEDICINE

## 2024-02-01 PROCEDURE — 3078F DIAST BP <80 MM HG: CPT | Performed by: INTERNAL MEDICINE

## 2024-02-01 RX ORDER — DULOXETIN HYDROCHLORIDE 60 MG/1
CAPSULE, DELAYED RELEASE ORAL
Qty: 30 CAPSULE | Refills: 3 | Status: SHIPPED | OUTPATIENT
Start: 2024-02-01

## 2024-02-01 NOTE — PROGRESS NOTES
MA Rooming Questions  Patient: Suyapa Serrano  MRN: [id number]    Date: 2/1/2024        1. Do you have any new issues?   no         2. Do you need any refills on medications?    yes - Cymbalta    3. Have you had any imaging done since your last visit?   no    4. Have you been hospitalized or seen in the emergency room since your last visit here?   no    5. Did the patient have a depression screening completed today? No    No data recorded     PHQ-9 Given to (if applicable):               PHQ-9 Score (if applicable):                     [] Positive     []  Negative              Does question #9 need addressed (if applicable)                     [] Yes    []  No               Gina Nguyen CMA      
and I will repeat all the blood tests again a week before next appointment.    Cancer screening - mammogram on 5/2023 was normal. Recommend her to have PAP smear.     Chemo induced muscle cramps - recommend her to use magnesium oxide prn. If needed, I will prescribe muscle relaxant.     Chemo induced diarrhea - mild symptom only. Recommend to take imodium prn.     She has mild neuropathy in her right upper extremity, but it is less likely to be related to revlimid. Will continue to monitor it for now.    Skin lesions - s/p excision by dermatology. Path still pending. To continue with niacinamide.     Osteoporosis - her primary care physician started fosamax. I recommend her to continue with that for now.     Chemo induced neutropenia -  Stable and will continue to monitor it for now.     COVID vaccine - She already received her COVID19 vaccine booster.     VTE prophylaxis - I recommend her to continue with aspirin 81 mg daily.     Hypertension - She is on atenolol. SBP is normal today.  I recommend salt restriction.     Chemo induced neuropathy - stable. To continue with cymbalta 60 mg daily.      Health maintenance - I recommend her to have age-appropriate cancer screening, exercise, low-fat and low-sodium diet.    I answered all her questions and concerns for today. Recent imaging and labs were reviewed and discussed with the patient.

## 2024-02-06 DIAGNOSIS — Z78.0 MENOPAUSE: Primary | ICD-10-CM

## 2024-02-06 NOTE — PROGRESS NOTES
This nurse called the patient at [phone] to review x-ray of spine results. Patient was notified that there is no sign of a lytic lesion however there is some bone demineralization noted. Patient was advised that Dr Ray recommends a Dexascan and she is agreeable. Order for Dexascan placed.

## 2024-02-07 ENCOUNTER — TELEPHONE (OUTPATIENT)
Dept: ONCOLOGY | Age: 69
End: 2024-02-07

## 2024-02-07 NOTE — TELEPHONE ENCOUNTER
Patient called given time and prep for dexa scan to be done on 2/14/24 [facility] arrival at 12:15 PM.

## 2024-02-08 DIAGNOSIS — C90.00 MULTIPLE MYELOMA, REMISSION STATUS UNSPECIFIED (HCC): ICD-10-CM

## 2024-02-08 RX ORDER — LENALIDOMIDE 10 MG/1
10 CAPSULE ORAL DAILY
Qty: 28 CAPSULE | Refills: 0 | Status: ACTIVE | OUTPATIENT
Start: 2024-02-08

## 2024-02-14 ENCOUNTER — HOSPITAL ENCOUNTER (OUTPATIENT)
Dept: WOMENS IMAGING | Age: 69
Discharge: HOME | End: 2024-02-14
Attending: INTERNAL MEDICINE
Payer: MEDICARE

## 2024-02-14 DIAGNOSIS — Z78.0 MENOPAUSE: ICD-10-CM

## 2024-02-14 PROCEDURE — 77080 DXA BONE DENSITY AXIAL: CPT

## 2024-02-28 RX ORDER — DULOXETIN HYDROCHLORIDE 60 MG/1
CAPSULE, DELAYED RELEASE ORAL
Qty: 30 CAPSULE | Refills: 3 | Status: SHIPPED | OUTPATIENT
Start: 2024-02-28

## 2024-03-04 DIAGNOSIS — C90.00 MULTIPLE MYELOMA, REMISSION STATUS UNSPECIFIED (HCC): ICD-10-CM

## 2024-03-04 RX ORDER — LENALIDOMIDE 10 MG/1
10 CAPSULE ORAL DAILY
Qty: 28 CAPSULE | Refills: 0 | Status: ACTIVE | OUTPATIENT
Start: 2024-03-04

## 2024-03-04 NOTE — PROGRESS NOTES
Revlimid 10 chemo capsules reordered and sent to [facility]. Prescriber survey completed and new auth # [id number]  obtained through [employer] portal. Auth valid for 30 days and attached to RX.

## 2024-03-28 DIAGNOSIS — C90.00 MULTIPLE MYELOMA, REMISSION STATUS UNSPECIFIED (HCC): ICD-10-CM

## 2024-03-29 ENCOUNTER — CLINICAL DOCUMENTATION (OUTPATIENT)
Dept: ONCOLOGY | Age: 69
End: 2024-03-29

## 2024-03-29 RX ORDER — LENALIDOMIDE 10 MG/1
CAPSULE ORAL
Qty: 28 CAPSULE | Refills: 0 | Status: ACTIVE | OUTPATIENT
Start: 2024-03-29

## 2024-03-29 NOTE — PROGRESS NOTES
Revlimid 10 mg chemo capsules reordered and e-scribed to [facility]. Prescriber survey completed and new auth # [id number] obtained through [employer] portal. Auth valid for 30 days and attached to RX.

## 2024-04-17 ENCOUNTER — HOSPITAL ENCOUNTER (OUTPATIENT)
Dept: INFUSION THERAPY | Age: 69
Discharge: HOME | End: 2024-04-17
Payer: MEDICARE

## 2024-04-17 DIAGNOSIS — C90.00 MULTIPLE MYELOMA NOT HAVING ACHIEVED REMISSION (HCC): ICD-10-CM

## 2024-04-17 DIAGNOSIS — C90.00 MULTIPLE MYELOMA, REMISSION STATUS UNSPECIFIED (HCC): ICD-10-CM

## 2024-04-17 LAB
ALBUMIN SERPL-MCNC: 4.1 GM/DL (ref 3.4–5)
ALP BLD-CCNC: 55 IU/L (ref 40–128)
ALT SERPL-CCNC: 23 U/L (ref 10–40)
ANION GAP SERPL CALCULATED.3IONS-SCNC: 10 MMOL/L (ref 7–16)
AST SERPL-CCNC: 20 IU/L (ref 15–37)
BASOPHILS ABSOLUTE: 0 K/CU MM
BASOPHILS RELATIVE PERCENT: 0.6 % (ref 0–1)
BILIRUB SERPL-MCNC: 0.5 MG/DL (ref 0–1)
BUN SERPL-MCNC: 13 MG/DL (ref 6–23)
CALCIUM SERPL-MCNC: 8.9 MG/DL (ref 8.3–10.6)
CHLORIDE BLD-SCNC: 102 MMOL/L (ref 99–110)
CO2: 28 MMOL/L (ref 21–32)
CREAT SERPL-MCNC: 1 MG/DL (ref 0.6–1.1)
DIFFERENTIAL TYPE: ABNORMAL
EOSINOPHILS ABSOLUTE: 0.1 K/CU MM
EOSINOPHILS RELATIVE PERCENT: 5.1 % (ref 0–3)
ERYTHROCYTE SEDIMENTATION RATE: 4 MM/HR (ref 0–30)
GFR SERPL CREATININE-BSD FRML MDRD: 61 ML/MIN/1.73M2
GLUCOSE SERPL-MCNC: 101 MG/DL (ref 70–99)
HCT VFR BLD CALC: 37.4 % (ref 37–47)
HEMOGLOBIN: 12.7 GM/DL (ref 12.5–16)
IGA: 214 MG/DL (ref 69–382)
IGG,SERUM: 1259 MG/DL (ref 723–1685)
IGM,SERUM: 46 MG/DL (ref 62–277)
LACTATE DEHYDROGENASE: 128 IU/L (ref 120–246)
LYMPHOCYTES ABSOLUTE: 0.4 K/CU MM
LYMPHOCYTES RELATIVE PERCENT: 19.8 % (ref 24–44)
MCH RBC QN AUTO: 32.1 PG (ref 27–31)
MCHC RBC AUTO-ENTMCNC: 34 % (ref 32–36)
MCV RBC AUTO: 94.4 FL (ref 78–100)
MONOCYTES ABSOLUTE: 0.2 K/CU MM
MONOCYTES RELATIVE PERCENT: 12.4 % (ref 0–4)
NEUTROPHILS RELATIVE PERCENT: 62.1 % (ref 36–66)
PDW BLD-RTO: 15.4 % (ref 11.7–14.9)
PLATELET # BLD: 162 K/CU MM (ref 140–440)
PMV BLD AUTO: 11 FL (ref 7.5–11.1)
POTASSIUM SERPL-SCNC: 3.8 MMOL/L (ref 3.5–5.1)
RBC # BLD: 3.96 M/CU MM (ref 4.2–5.4)
RPT COMMENT: NORMAL
SEGMENTED NEUTROPHILS ABSOLUTE COUNT: 1.1 K/CU MM
SODIUM BLD-SCNC: 140 MMOL/L (ref 135–145)
TOTAL PROTEIN: 6.6 GM/DL (ref 6.4–8.2)
WBC # BLD: 1.8 K/CU MM (ref 4–10.5)

## 2024-04-17 PROCEDURE — 82232 ASSAY OF BETA-2 PROTEIN: CPT

## 2024-04-17 PROCEDURE — 82784 ASSAY IGA/IGD/IGG/IGM EACH: CPT

## 2024-04-17 PROCEDURE — 84155 ASSAY OF PROTEIN SERUM: CPT

## 2024-04-17 PROCEDURE — 84165 PROTEIN E-PHORESIS SERUM: CPT

## 2024-04-17 PROCEDURE — 83615 LACTATE (LD) (LDH) ENZYME: CPT

## 2024-04-17 PROCEDURE — 85652 RBC SED RATE AUTOMATED: CPT

## 2024-04-17 PROCEDURE — 83883 ASSAY NEPHELOMETRY NOT SPEC: CPT

## 2024-04-17 PROCEDURE — 36415 COLL VENOUS BLD VENIPUNCTURE: CPT

## 2024-04-17 PROCEDURE — 80053 COMPREHEN METABOLIC PANEL: CPT

## 2024-04-17 PROCEDURE — 85025 COMPLETE CBC W/AUTO DIFF WBC: CPT

## 2024-04-19 LAB
ALBUMIN SERPL ELPH-MCNC: 3.6 GM/DL (ref 3.2–5.6)
ALPHA-1-GLOBULIN: 0.2 GM/DL (ref 0.1–0.4)
ALPHA-2-GLOBULIN: 0.6 GM/DL (ref 0.4–1.2)
B2 MICROGLOB SERPL-MCNC: 2.6 MG/L
BETA GLOBULIN: 0.9 GM/DL (ref 0.5–1.3)
GAMMA GLOBULIN: 1.3 GM/DL (ref 0.5–1.6)
KAPPA LC FREE SER-MCNC: 55.34 MG/L (ref 3.3–19.4)
KAPPA LC FREE/LAMBDA FREE SER NEPH: 1.56 {RATIO} (ref 0.26–1.65)
LAMBDA LC FREE SERPL-MCNC: 35.42 MG/L (ref 5.71–26.3)
SPEP INTERPRETATION: NORMAL
TOTAL PROTEIN: 6.6 GM/DL (ref 6.4–8.2)

## 2024-04-29 ENCOUNTER — CLINICAL DOCUMENTATION (OUTPATIENT)
Dept: ONCOLOGY | Age: 69
End: 2024-04-29

## 2024-04-29 DIAGNOSIS — C90.00 MULTIPLE MYELOMA, REMISSION STATUS UNSPECIFIED (HCC): ICD-10-CM

## 2024-04-29 RX ORDER — LENALIDOMIDE 10 MG/1
CAPSULE ORAL
Qty: 28 CAPSULE | Refills: 0 | Status: ACTIVE | OUTPATIENT
Start: 2024-04-29

## 2024-04-29 NOTE — PROGRESS NOTES
Revlimid 10 mg chemo capsules reordered and sent to [facility]. Prescriber survey completed and new auth # [id number]  obtained through [employer] portal. Auth valid for 30 days and attached to RX.

## 2024-05-01 ENCOUNTER — HOSPITAL ENCOUNTER (OUTPATIENT)
Dept: INFUSION THERAPY | Age: 69
Discharge: HOME | End: 2024-05-01
Payer: MEDICARE

## 2024-05-01 ENCOUNTER — OFFICE VISIT (OUTPATIENT)
Dept: ONCOLOGY | Age: 69
End: 2024-05-01
Payer: MEDICARE

## 2024-05-01 VITALS
HEART RATE: 62 BPM | OXYGEN SATURATION: 98 % | SYSTOLIC BLOOD PRESSURE: 154 MMHG | DIASTOLIC BLOOD PRESSURE: 67 MMHG | BODY MASS INDEX: 25.03 KG/M2 | HEIGHT: 64 IN | TEMPERATURE: 96.8 F | WEIGHT: 146.6 LBS

## 2024-05-01 DIAGNOSIS — C90.00 MULTIPLE MYELOMA NOT HAVING ACHIEVED REMISSION (HCC): Primary | ICD-10-CM

## 2024-05-01 PROCEDURE — 99214 OFFICE O/P EST MOD 30 MIN: CPT | Performed by: INTERNAL MEDICINE

## 2024-05-01 PROCEDURE — 1123F ACP DISCUSS/DSCN MKR DOCD: CPT | Performed by: INTERNAL MEDICINE

## 2024-05-01 PROCEDURE — 99211 OFF/OP EST MAY X REQ PHY/QHP: CPT

## 2024-05-01 PROCEDURE — 3077F SYST BP >= 140 MM HG: CPT | Performed by: INTERNAL MEDICINE

## 2024-05-01 PROCEDURE — 3078F DIAST BP <80 MM HG: CPT | Performed by: INTERNAL MEDICINE

## 2024-05-01 NOTE — PROGRESS NOTES
MA Rooming Questions  Patient: Suyapa Serrano  MRN: [id number]    Date: 5/1/2024        1. Do you have any new issues?   no         2. Do you need any refills on medications?    no    3. Have you had any imaging done since your last visit?   no    4. Have you been hospitalized or seen in the emergency room since your last visit here?   no    5. Did the patient have a depression screening completed today? No    No data recorded     PHQ-9 Given to (if applicable):               PHQ-9 Score (if applicable):                     [] Positive     []  Negative              Does question #9 need addressed (if applicable)                     [] Yes    []  No               Gina Nguyen CMA

## 2024-05-01 NOTE — PROGRESS NOTES
Patient Name: Suyapa Serrano  Patient : 1955  Patient MRN: [id number]     Primary Oncologist: Emilio Ray MD  Referring Provider: Elmo Jarvis MD     Date of Service: 2024      Chief Complaint:   Chief Complaint   Patient presents with    Follow-up     Patient Active Problem List:     Multiple myeloma     HPI:  Suyapa Serrano is a 69-year-old very pleasant female with medical history significant for hypertension, anxiety disorder, and anemia since 2015, initially referred to me for evaluation for her normocytic normochromic anemia.      She stated that she has been having right lateral chest wall pain starting May 2015.  She had chest x-ray done in 2015 and it showed left sixth rib fracture.  Her pain has been controlled with ibuprofen since then.      She was found to have anemia (hemoglobin 9.3) on nd she was started with oral iron supplementation.  Her anemia got worse on repeat blood test done on 2015, (hemoglobin 8.1).  She had chest x-ray and ultrasound of the abdomen on 2015, and they were normal.      She had a colonoscopy by Dr. Gilbert about three to four years ago which was normal according to her.  She also had history of melanoma twice in the past (left lower extremity melanoma which was excised in  and right shoulder melanoma which was excised in ).    She was also found to have elevated total protein by primary care physician.  Because of her normocytic normochromic anemia associated with elevated total protein, she was subsequently referred to me for evaluation of possible plasma cell dyscrasia.      She denies fever, night sweats, loss of appetite, and weight loss.  She does not have any other significant symptoms except right lower chest wall pain.      Laboratory workups done on 2015, showed persistent normocytic normochromic anemia (hemoglobin 8.2 and MCV 89), persisted elevated total protein (total

## 2024-05-25 DIAGNOSIS — M81.0 OSTEOPOROSIS, UNSPECIFIED OSTEOPOROSIS TYPE, UNSPECIFIED PATHOLOGICAL FRACTURE PRESENCE: ICD-10-CM

## 2024-05-28 RX ORDER — ALENDRONATE SODIUM 70 MG/1
TABLET ORAL
Qty: 4 TABLET | Refills: 11 | Status: SHIPPED | OUTPATIENT
Start: 2024-05-28

## 2024-06-04 DIAGNOSIS — I10 ESSENTIAL HYPERTENSION: ICD-10-CM

## 2024-06-04 RX ORDER — ATENOLOL 50 MG/1
TABLET ORAL
Qty: 90 TABLET | Refills: 2 | Status: SHIPPED | OUTPATIENT
Start: 2024-06-04

## 2024-06-05 ENCOUNTER — CLINICAL DOCUMENTATION (OUTPATIENT)
Dept: ONCOLOGY | Age: 69
End: 2024-06-05

## 2024-06-05 DIAGNOSIS — C90.00 MULTIPLE MYELOMA, REMISSION STATUS UNSPECIFIED (HCC): ICD-10-CM

## 2024-06-05 RX ORDER — LENALIDOMIDE 10 MG/1
CAPSULE ORAL
Qty: 28 CAPSULE | Refills: 0 | Status: ACTIVE | OUTPATIENT
Start: 2024-06-05

## 2024-06-05 NOTE — PROGRESS NOTES
Revlimid 10 mg chemo capsules reordered and sent to [facility]. Prescriber survey completed and new auth # [id number]  obtained through [employer] portal. Auth valid for 30 days and attached to RX.

## 2024-06-27 ENCOUNTER — HOSPITAL ENCOUNTER (OUTPATIENT)
Dept: WOMENS IMAGING | Age: 69
Discharge: HOME | End: 2024-06-27
Payer: MEDICARE

## 2024-06-27 DIAGNOSIS — Z12.31 SCREENING MAMMOGRAM, ENCOUNTER FOR: ICD-10-CM

## 2024-06-27 PROCEDURE — 77063 BREAST TOMOSYNTHESIS BI: CPT

## 2024-07-03 ENCOUNTER — CLINICAL DOCUMENTATION (OUTPATIENT)
Dept: ONCOLOGY | Age: 69
End: 2024-07-03

## 2024-07-03 DIAGNOSIS — C90.00 MULTIPLE MYELOMA, REMISSION STATUS UNSPECIFIED (HCC): ICD-10-CM

## 2024-07-03 RX ORDER — LENALIDOMIDE 10 MG/1
CAPSULE ORAL
Qty: 28 CAPSULE | Refills: 0 | Status: ACTIVE | OUTPATIENT
Start: 2024-07-03

## 2024-07-03 NOTE — PROGRESS NOTES
Revlimid 10 mg chemo capsules reordered and sent to [facility]. Prescriber survey completed and new auth # [id number]  obtained through [employer] portal. Auth valid for 30 days and attached to RX.

## 2024-07-24 ENCOUNTER — OFFICE VISIT (OUTPATIENT)
Dept: INTERNAL MEDICINE CLINIC | Age: 69
End: 2024-07-24
Payer: MEDICARE

## 2024-07-24 VITALS
WEIGHT: 149 LBS | BODY MASS INDEX: 25.44 KG/M2 | HEIGHT: 64 IN | RESPIRATION RATE: 16 BRPM | SYSTOLIC BLOOD PRESSURE: 132 MMHG | OXYGEN SATURATION: 97 % | DIASTOLIC BLOOD PRESSURE: 60 MMHG | HEART RATE: 51 BPM

## 2024-07-24 DIAGNOSIS — I26.94 MULTIPLE SUBSEGMENTAL PULMONARY EMBOLI WITHOUT ACUTE COR PULMONALE (HCC): ICD-10-CM

## 2024-07-24 DIAGNOSIS — C90.00 MULTIPLE MYELOMA NOT HAVING ACHIEVED REMISSION (HCC): ICD-10-CM

## 2024-07-24 DIAGNOSIS — I10 ESSENTIAL HYPERTENSION: Primary | ICD-10-CM

## 2024-07-24 DIAGNOSIS — M75.82 TENDINITIS OF LEFT ROTATOR CUFF: ICD-10-CM

## 2024-07-24 PROCEDURE — 99214 OFFICE O/P EST MOD 30 MIN: CPT | Performed by: INTERNAL MEDICINE

## 2024-07-24 PROCEDURE — 3078F DIAST BP <80 MM HG: CPT | Performed by: INTERNAL MEDICINE

## 2024-07-24 PROCEDURE — 1123F ACP DISCUSS/DSCN MKR DOCD: CPT | Performed by: INTERNAL MEDICINE

## 2024-07-24 PROCEDURE — 20610 DRAIN/INJ JOINT/BURSA W/O US: CPT | Performed by: INTERNAL MEDICINE

## 2024-07-24 PROCEDURE — 3075F SYST BP GE 130 - 139MM HG: CPT | Performed by: INTERNAL MEDICINE

## 2024-07-24 RX ORDER — TRIAMCINOLONE ACETONIDE 40 MG/ML
40 INJECTION, SUSPENSION INTRA-ARTICULAR; INTRAMUSCULAR ONCE
Status: COMPLETED | OUTPATIENT
Start: 2024-07-24 | End: 2024-07-24

## 2024-07-24 RX ADMIN — TRIAMCINOLONE ACETONIDE 40 MG: 40 INJECTION, SUSPENSION INTRA-ARTICULAR; INTRAMUSCULAR at 09:09

## 2024-07-24 NOTE — PROGRESS NOTES
Suyapa Serrano  1955 07/24/24    SUBJECTIVE:    Pt continues to follow closely with derm for skin cancers.    She continues to follow with Dr Ray for the myeloma. She continues on revlimid.    Pt with left shoulder pain, mar with reaching  out, internal rotation, abduction. This started last month, seems to be improving. She describes this as an ache. She may have weakness    The patient is taking hypertensive medications compliantly without side effects.  Denies chest pain, dyspnea, edema, or TIA's.     She continues on eliquis for PE - she denies blood in the stool, black stools.     OBJECTIVE:    /60 (Site: Right Upper Arm, Position: Sitting, Cuff Size: Medium Adult)   Pulse 51   Resp 16   Ht 1.626 m (5' 4\")   Wt 67.6 kg (149 lb)   SpO2 97%   BMI 25.58 kg/m²     Physical Exam  Constitutional:       Appearance: She is well-developed.   Eyes:      General: No scleral icterus.     Conjunctiva/sclera: Conjunctivae normal.   Cardiovascular:      Rate and Rhythm: Normal rate and regular rhythm.      Heart sounds: Normal heart sounds. No murmur heard.  Pulmonary:      Effort: Pulmonary effort is normal. No respiratory distress.      Breath sounds: Normal breath sounds. No wheezing.   Musculoskeletal:      Comments: (+) impingement sign. (+) Apley scratch test. No pain with palpation of biceps tendon or AC joint. (-) apprehension test. No decreased strength.         ASSESSMENT:    1. Essential hypertension    2. Multiple subsegmental pulmonary emboli without acute cor pulmonale (HCC)    3. Multiple myeloma not having achieved remission (HCC)    4. Tendinitis of left rotator cuff        PLAN:    Suyapa was seen today for 6 month follow-up and shoulder pain.    Diagnoses and all orders for this visit:    Essential hypertension  - at goal; reviewed recent labs    Multiple subsegmental pulmonary emboli without acute cor pulmonale (HCC) - cont eliquis; no change    Multiple myeloma not having achieved

## 2024-08-01 ENCOUNTER — HOSPITAL ENCOUNTER (OUTPATIENT)
Dept: INFUSION THERAPY | Age: 69
Discharge: HOME | End: 2024-08-01
Payer: MEDICARE

## 2024-08-01 DIAGNOSIS — C90.00 MULTIPLE MYELOMA NOT HAVING ACHIEVED REMISSION (HCC): ICD-10-CM

## 2024-08-01 LAB
ALBUMIN SERPL-MCNC: 4 GM/DL (ref 3.4–5)
ALP BLD-CCNC: 62 IU/L (ref 40–129)
ALT SERPL-CCNC: 20 U/L (ref 10–40)
ANION GAP SERPL CALCULATED.3IONS-SCNC: 11 MMOL/L (ref 7–16)
AST SERPL-CCNC: 17 IU/L (ref 15–37)
BASOPHILS ABSOLUTE: 0 K/CU MM
BASOPHILS RELATIVE PERCENT: 0.8 % (ref 0–1)
BILIRUB SERPL-MCNC: 0.6 MG/DL (ref 0–1)
BUN SERPL-MCNC: 18 MG/DL (ref 6–23)
CALCIUM SERPL-MCNC: 9.1 MG/DL (ref 8.3–10.6)
CHLORIDE BLD-SCNC: 97 MMOL/L (ref 99–110)
CO2: 26 MMOL/L (ref 21–32)
CREAT SERPL-MCNC: 0.9 MG/DL (ref 0.6–1.1)
DIFFERENTIAL TYPE: ABNORMAL
EOSINOPHILS ABSOLUTE: 0.1 K/CU MM
EOSINOPHILS RELATIVE PERCENT: 3.4 % (ref 0–3)
GFR, ESTIMATED: 69 ML/MIN/1.73M2
GLUCOSE SERPL-MCNC: 102 MG/DL (ref 70–99)
HCT VFR BLD CALC: 38.3 % (ref 37–47)
HEMOGLOBIN: 13.1 GM/DL (ref 12.5–16)
IGA: 206 MG/DL (ref 69–382)
IGG,SERUM: 1170 MG/DL (ref 723–1685)
IGM,SERUM: 45 MG/DL (ref 62–277)
LACTATE DEHYDROGENASE: 141 IU/L (ref 120–246)
LYMPHOCYTES ABSOLUTE: 0.4 K/CU MM
LYMPHOCYTES RELATIVE PERCENT: 14 % (ref 24–44)
MCH RBC QN AUTO: 32.3 PG (ref 27–31)
MCHC RBC AUTO-ENTMCNC: 34.2 % (ref 32–36)
MCV RBC AUTO: 94.6 FL (ref 78–100)
MONOCYTES ABSOLUTE: 0.5 K/CU MM
MONOCYTES RELATIVE PERCENT: 18.2 % (ref 0–4)
NEUTROPHILS ABSOLUTE: 1.7 K/CU MM
NEUTROPHILS RELATIVE PERCENT: 63.6 % (ref 36–66)
PDW BLD-RTO: 14.7 % (ref 11.7–14.9)
PLATELET # BLD: 181 K/CU MM (ref 140–440)
PMV BLD AUTO: 10.9 FL (ref 7.5–11.1)
POTASSIUM SERPL-SCNC: 4.1 MMOL/L (ref 3.5–5.1)
RBC # BLD: 4.05 M/CU MM (ref 4.2–5.4)
SED RATE, AUTOMATED: 1 MM/HR (ref 0–30)
SODIUM BLD-SCNC: 134 MMOL/L (ref 135–145)
TOTAL PROTEIN: 6.5 GM/DL (ref 6.4–8.2)
TOTAL PROTEIN: 6.5 GM/DL (ref 6.4–8.2)
WBC # BLD: 2.6 K/CU MM (ref 4–10.5)

## 2024-08-01 PROCEDURE — 86320 SERUM IMMUNOELECTROPHORESIS: CPT

## 2024-08-01 PROCEDURE — 85025 COMPLETE CBC W/AUTO DIFF WBC: CPT

## 2024-08-01 PROCEDURE — 80053 COMPREHEN METABOLIC PANEL: CPT

## 2024-08-01 PROCEDURE — 85652 RBC SED RATE AUTOMATED: CPT

## 2024-08-01 PROCEDURE — 84155 ASSAY OF PROTEIN SERUM: CPT

## 2024-08-01 PROCEDURE — 84165 PROTEIN E-PHORESIS SERUM: CPT

## 2024-08-01 PROCEDURE — 82784 ASSAY IGA/IGD/IGG/IGM EACH: CPT

## 2024-08-01 PROCEDURE — 82232 ASSAY OF BETA-2 PROTEIN: CPT

## 2024-08-01 PROCEDURE — 83883 ASSAY NEPHELOMETRY NOT SPEC: CPT

## 2024-08-01 PROCEDURE — 83615 LACTATE (LD) (LDH) ENZYME: CPT

## 2024-08-01 PROCEDURE — 36415 COLL VENOUS BLD VENIPUNCTURE: CPT

## 2024-08-03 LAB
B2 MICROGLOB SERPL-MCNC: 2 MG/L
KAPPA LC FREE SER-MCNC: 34.05 MG/L (ref 3.3–19.4)
KAPPA LC FREE/LAMBDA FREE SER NEPH: 1.45 {RATIO} (ref 0.26–1.65)
LAMBDA LC FREE SERPL-MCNC: 23.48 MG/L (ref 5.71–26.3)

## 2024-08-03 NOTE — PROGRESS NOTES
Patient Name: Suyapa Serrano  Patient : 1955  Patient MRN: [id number]     Primary Oncologist: Emilio Ray MD  Referring Provider: Elmo Jarvis MD     Date of Service: 2024      Chief Complaint:   Chief Complaint   Patient presents with    Follow-up     Patient Active Problem List:     Multiple myeloma     HPI:  Suyapa Serrano is a 69-year-old very pleasant female with medical history significant for hypertension, anxiety disorder, and anemia since 2015, initially referred to me for evaluation for her normocytic normochromic anemia.      She stated that she has been having right lateral chest wall pain starting May 2015.  She had chest x-ray done in 2015 and it showed left sixth rib fracture.  Her pain has been controlled with ibuprofen since then.      She was found to have anemia (hemoglobin 9.3) on nd she was started with oral iron supplementation.  Her anemia got worse on repeat blood test done on 2015, (hemoglobin 8.1).  She had chest x-ray and ultrasound of the abdomen on 2015, and they were normal.      She had a colonoscopy by Dr. Gilbert about three to four years ago which was normal according to her.  She also had history of melanoma twice in the past (left lower extremity melanoma which was excised in  and right shoulder melanoma which was excised in ).    She was also found to have elevated total protein by primary care physician.  Because of her normocytic normochromic anemia associated with elevated total protein, she was subsequently referred to me for evaluation of possible plasma cell dyscrasia.      She denies fever, night sweats, loss of appetite, and weight loss.  She does not have any other significant symptoms except right lower chest wall pain.      Laboratory workups done on 2015, showed persistent normocytic normochromic anemia (hemoglobin 8.2 and MCV 89), persisted elevated total protein (total

## 2024-08-07 LAB
ALBUMIN SERPL ELPH-MCNC: 3.5 GM/DL (ref 3.2–5.6)
ALPHA-1-GLOBULIN: 0.2 GM/DL (ref 0.1–0.4)
ALPHA-2-GLOBULIN: 0.6 GM/DL (ref 0.4–1.2)
BETA GLOBULIN: 0.9 GM/DL (ref 0.5–1.3)
GAMMA GLOBULIN: 1.2 GM/DL (ref 0.5–1.6)
SPEP INTERPRETATION: NORMAL
SPEP INTERPRETATION: NORMAL
TOTAL PROTEIN: 6.5 GM/DL (ref 6.4–8.2)

## 2024-08-08 ENCOUNTER — CLINICAL DOCUMENTATION (OUTPATIENT)
Dept: ONCOLOGY | Age: 69
End: 2024-08-08

## 2024-08-08 DIAGNOSIS — C90.00 MULTIPLE MYELOMA, REMISSION STATUS UNSPECIFIED (HCC): ICD-10-CM

## 2024-08-08 RX ORDER — LENALIDOMIDE 10 MG/1
CAPSULE ORAL
Qty: 28 CAPSULE | Refills: 0 | Status: ACTIVE | OUTPATIENT
Start: 2024-08-08

## 2024-08-08 NOTE — PROGRESS NOTES
Revlimid 10 mg chemo capsules reordered and sent to [facility]. Prescriber survey completed and new auth # [id number] obtained through [employer] portal. Auth valid for 30 days and attached to RX.

## 2024-08-09 RX ORDER — APIXABAN 5 MG/1
5 TABLET, FILM COATED ORAL 2 TIMES DAILY
Qty: 60 TABLET | Refills: 11 | Status: SHIPPED | OUTPATIENT
Start: 2024-08-09

## 2024-08-13 ENCOUNTER — OFFICE VISIT (OUTPATIENT)
Dept: ONCOLOGY | Age: 69
End: 2024-08-13
Payer: MEDICARE

## 2024-08-13 ENCOUNTER — HOSPITAL ENCOUNTER (OUTPATIENT)
Dept: INFUSION THERAPY | Age: 69
Discharge: HOME | End: 2024-08-13
Payer: MEDICARE

## 2024-08-13 VITALS
OXYGEN SATURATION: 97 % | TEMPERATURE: 97.2 F | DIASTOLIC BLOOD PRESSURE: 63 MMHG | WEIGHT: 144.6 LBS | HEART RATE: 60 BPM | BODY MASS INDEX: 24.69 KG/M2 | SYSTOLIC BLOOD PRESSURE: 145 MMHG | HEIGHT: 64 IN

## 2024-08-13 DIAGNOSIS — C90.00 MULTIPLE MYELOMA NOT HAVING ACHIEVED REMISSION (HCC): Primary | ICD-10-CM

## 2024-08-13 PROCEDURE — 99211 OFF/OP EST MAY X REQ PHY/QHP: CPT

## 2024-08-13 PROCEDURE — 1123F ACP DISCUSS/DSCN MKR DOCD: CPT | Performed by: INTERNAL MEDICINE

## 2024-08-13 PROCEDURE — 99214 OFFICE O/P EST MOD 30 MIN: CPT | Performed by: INTERNAL MEDICINE

## 2024-08-13 PROCEDURE — 3077F SYST BP >= 140 MM HG: CPT | Performed by: INTERNAL MEDICINE

## 2024-08-13 PROCEDURE — 3078F DIAST BP <80 MM HG: CPT | Performed by: INTERNAL MEDICINE

## 2024-08-13 NOTE — PROGRESS NOTES
MA Rooming Questions  Patient: Suyapa Serrano  MRN: [id number]    Date: 8/13/2024        1. Do you have any new issues?   yes - She is having melanoma excision on Thurs 8/15.         2. Do you need any refills on medications?    no    3. Have you had any imaging done since your last visit?   no    4. Have you been hospitalized or seen in the emergency room since your last visit here?   no    5. Did the patient have a depression screening completed today? No    No data recorded     PHQ-9 Given to (if applicable):               PHQ-9 Score (if applicable):                     [] Positive     []  Negative              Does question #9 need addressed (if applicable)                     [] Yes    []  No               Gina Nguyen CMA

## 2024-09-11 DIAGNOSIS — C90.00 MULTIPLE MYELOMA, REMISSION STATUS UNSPECIFIED (HCC): ICD-10-CM

## 2024-09-11 RX ORDER — LENALIDOMIDE 10 MG/1
CAPSULE ORAL
Qty: 28 CAPSULE | Refills: 0 | Status: ACTIVE | OUTPATIENT
Start: 2024-09-11

## 2024-09-17 DIAGNOSIS — C90.00 MULTIPLE MYELOMA, REMISSION STATUS UNSPECIFIED (HCC): ICD-10-CM

## 2024-09-17 RX ORDER — LENALIDOMIDE 10 MG/1
10 CAPSULE ORAL DAILY
Qty: 28 CAPSULE | Refills: 0 | Status: ACTIVE | OUTPATIENT
Start: 2024-09-17

## 2024-09-17 RX ORDER — LENALIDOMIDE 10 MG/1
10 CAPSULE ORAL DAILY
Qty: 28 CAPSULE | Refills: 0 | Status: SHIPPED | OUTPATIENT
Start: 2024-09-17 | End: 2024-09-17

## 2024-10-09 ENCOUNTER — CLINICAL DOCUMENTATION (OUTPATIENT)
Dept: ONCOLOGY | Age: 69
End: 2024-10-09

## 2024-10-09 DIAGNOSIS — C90.00 MULTIPLE MYELOMA, REMISSION STATUS UNSPECIFIED (HCC): ICD-10-CM

## 2024-10-09 RX ORDER — LENALIDOMIDE 10 MG/1
CAPSULE ORAL
Qty: 28 CAPSULE | Refills: 0 | Status: ACTIVE | OUTPATIENT
Start: 2024-10-09

## 2024-10-09 NOTE — PROGRESS NOTES
Revlimid 10 mg chemo capsules reordered and sent to [facility]. Prescriber survey completed and new auth # [id number] obtained through [employer] portal. Auth valid for 30 days and attached to RX.

## 2024-11-03 NOTE — PROGRESS NOTES
Patient Name: Suyapa Serrano  Patient : 1955  Patient MRN: [id number]     Primary Oncologist: Emilio Ray MD  Referring Provider: Elmo Jarvis MD     Date of Service: 2024      Chief Complaint:   Chief Complaint   Patient presents with    Follow-up     Patient Active Problem List:     Multiple myeloma     HPI:  Suyapa Serrano is a 69-year-old very pleasant female with medical history significant for hypertension, anxiety disorder, and anemia since 2015, initially referred to me for evaluation for her normocytic normochromic anemia.      She stated that she has been having right lateral chest wall pain starting May 2015.  She had chest x-ray done in 2015 and it showed left sixth rib fracture.  Her pain has been controlled with ibuprofen since then.      She was found to have anemia (hemoglobin 9.3) on nd she was started with oral iron supplementation.  Her anemia got worse on repeat blood test done on 2015, (hemoglobin 8.1).  She had chest x-ray and ultrasound of the abdomen on 2015, and they were normal.      She had a colonoscopy by Dr. Gilbert about three to four years ago which was normal according to her.  She also had history of melanoma twice in the past (left lower extremity melanoma which was excised in  and right shoulder melanoma which was excised in ).    She was also found to have elevated total protein by primary care physician.  Because of her normocytic normochromic anemia associated with elevated total protein, she was subsequently referred to me for evaluation of possible plasma cell dyscrasia.      She denies fever, night sweats, loss of appetite, and weight loss.  She does not have any other significant symptoms except right lower chest wall pain.      Laboratory workups done on 2015, showed persistent normocytic normochromic anemia (hemoglobin 8.2 and MCV 89), persisted elevated total protein (total

## 2024-11-06 ENCOUNTER — HOSPITAL ENCOUNTER (OUTPATIENT)
Dept: INFUSION THERAPY | Age: 69
Discharge: HOME | End: 2024-11-06
Payer: MEDICARE

## 2024-11-06 DIAGNOSIS — C90.00 MULTIPLE MYELOMA, REMISSION STATUS UNSPECIFIED (HCC): ICD-10-CM

## 2024-11-06 DIAGNOSIS — C90.00 MULTIPLE MYELOMA NOT HAVING ACHIEVED REMISSION (HCC): ICD-10-CM

## 2024-11-06 LAB
ALBUMIN SERPL-MCNC: 3.9 G/DL (ref 3.4–5)
ALBUMIN/GLOB SERPL: 2 {RATIO} (ref 1.1–2.2)
ALP SERPL-CCNC: 48 U/L (ref 40–129)
ALT SERPL-CCNC: 28 U/L (ref 10–40)
ANION GAP SERPL CALCULATED.3IONS-SCNC: 9 MMOL/L (ref 9–17)
AST SERPL-CCNC: 23 U/L (ref 15–37)
BASOPHILS # BLD: 0.01 K/UL
BASOPHILS NFR BLD: 1 % (ref 0–1)
BILIRUB SERPL-MCNC: 0.5 MG/DL (ref 0–1)
BUN SERPL-MCNC: 14 MG/DL (ref 7–20)
CALCIUM SERPL-MCNC: 9.2 MG/DL (ref 8.3–10.6)
CHLORIDE SERPL-SCNC: 100 MMOL/L (ref 99–110)
CO2 SERPL-SCNC: 29 MMOL/L (ref 21–32)
CREAT SERPL-MCNC: 0.9 MG/DL (ref 0.6–1.2)
EOSINOPHIL # BLD: 0.11 K/UL
EOSINOPHILS RELATIVE PERCENT: 5 % (ref 0–3)
ERYTHROCYTE [DISTWIDTH] IN BLOOD BY AUTOMATED COUNT: 14.6 % (ref 11.7–14.9)
ERYTHROCYTE [SEDIMENTATION RATE] IN BLOOD BY WESTERGREN METHOD: 5 MM/HR (ref 0–30)
GFR, ESTIMATED: 60 ML/MIN/1.73M2
GLUCOSE SERPL-MCNC: 94 MG/DL (ref 74–99)
HCT VFR BLD AUTO: 35.3 % (ref 37–47)
HGB BLD-MCNC: 12 G/DL (ref 12.5–16)
IGA SERPL-MCNC: 184 MG/DL (ref 70–400)
IGG SERPL-MCNC: 1098 MG/DL (ref 700–1600)
IGM SERPL-MCNC: 37 MG/DL (ref 40–230)
LDH SERPL-CCNC: 146 U/L (ref 100–190)
LYMPHOCYTES NFR BLD: 0.39 K/UL
LYMPHOCYTES RELATIVE PERCENT: 19 % (ref 24–44)
MCH RBC QN AUTO: 33.1 PG (ref 27–31)
MCHC RBC AUTO-ENTMCNC: 34 G/DL (ref 32–36)
MCV RBC AUTO: 97.5 FL (ref 78–100)
MONOCYTES NFR BLD: 0.35 K/UL
MONOCYTES NFR BLD: 17 % (ref 0–4)
NEUTROPHILS NFR BLD: 58 % (ref 36–66)
NEUTS SEG NFR BLD: 1.2 K/UL
PLATELET # BLD AUTO: 174 K/UL (ref 140–440)
PMV BLD AUTO: 10.8 FL (ref 7.5–11.1)
POTASSIUM SERPL-SCNC: 3.8 MMOL/L (ref 3.5–5.1)
PROT SERPL-MCNC: 5.8 G/DL (ref 6.4–8.2)
RBC # BLD AUTO: 3.62 M/UL (ref 4.2–5.4)
SODIUM SERPL-SCNC: 137 MMOL/L (ref 136–145)
WBC OTHER # BLD: 2.1 K/UL (ref 4–10.5)

## 2024-11-06 PROCEDURE — 82784 ASSAY IGA/IGD/IGG/IGM EACH: CPT

## 2024-11-06 PROCEDURE — 86334 IMMUNOFIX E-PHORESIS SERUM: CPT

## 2024-11-06 PROCEDURE — 85025 COMPLETE CBC W/AUTO DIFF WBC: CPT

## 2024-11-06 PROCEDURE — 36415 COLL VENOUS BLD VENIPUNCTURE: CPT

## 2024-11-06 PROCEDURE — 83615 LACTATE (LD) (LDH) ENZYME: CPT

## 2024-11-06 PROCEDURE — 82232 ASSAY OF BETA-2 PROTEIN: CPT

## 2024-11-06 PROCEDURE — 84165 PROTEIN E-PHORESIS SERUM: CPT

## 2024-11-06 PROCEDURE — 83521 IG LIGHT CHAINS FREE EACH: CPT

## 2024-11-06 PROCEDURE — 80053 COMPREHEN METABOLIC PANEL: CPT

## 2024-11-06 PROCEDURE — 84155 ASSAY OF PROTEIN SERUM: CPT

## 2024-11-06 PROCEDURE — 85652 RBC SED RATE AUTOMATED: CPT

## 2024-11-06 RX ORDER — LENALIDOMIDE 10 MG/1
10 CAPSULE ORAL DAILY
Qty: 28 CAPSULE | Refills: 0 | Status: ACTIVE | OUTPATIENT
Start: 2024-11-06 | End: 2024-12-04

## 2024-11-06 RX ORDER — LENALIDOMIDE 10 MG/1
CAPSULE ORAL
Qty: 28 CAPSULE | Refills: 0 | OUTPATIENT
Start: 2024-11-06

## 2024-11-06 NOTE — PROGRESS NOTES
Revlimid 10 mg chemo capsules reordered and sent to [facility]. Prescriber survey completed and new auth # [id number]  obtained through [employer] portal. Auth valid for 30 days and attached to RX.

## 2024-11-08 LAB
BETA-2 MICROGLOBULIN: 2.3 MG/L
COMMENT: ABNORMAL
KAPPA FREE LIGHT CHAIN: 48.6 MG/L (ref 2.37–20.73)
KAPPA/LAMBDA RATIO: 1.32 (ref 0.22–1.74)
LAMBDA FREE LIGHT CHAIN: 36.8 MG/L (ref 4.23–27.69)

## 2024-11-09 LAB
ALBUMIN PERCENT: NORMAL %
ALBUMIN SERPL-MCNC: NORMAL G/DL
ALPHA 2 PERCENT: NORMAL %
ALPHA1 GLOB SERPL ELPH-MCNC: NORMAL %
ALPHA1 GLOB SERPL ELPH-MCNC: NORMAL G/DL
ALPHA2 GLOB SERPL ELPH-MCNC: NORMAL G/DL
B-GLOBULIN SERPL ELPH-MCNC: NORMAL %
B-GLOBULIN SERPL ELPH-MCNC: NORMAL G/DL
GAMMA GLOB SERPL ELPH-MCNC: NORMAL G/DL
GAMMA GLOBULIN %: NORMAL %
M PROTEIN 2 SERPL ELPH-MCNC: NORMAL G/DL
M PROTEIN SERPL ELPH-MCNC: NORMAL G/DL
PATHOLOGIST: NORMAL
PROT PATTERN SERPL ELPH-IMP: NORMAL
PROT SERPL-MCNC: 5.8 G/DL
TOTAL PROT. SUM,%: NORMAL %
TOTAL PROT. SUM: NORMAL G/DL

## 2024-11-13 ENCOUNTER — OFFICE VISIT (OUTPATIENT)
Dept: ONCOLOGY | Age: 69
End: 2024-11-13
Payer: MEDICARE

## 2024-11-13 ENCOUNTER — HOSPITAL ENCOUNTER (OUTPATIENT)
Dept: INFUSION THERAPY | Age: 69
Discharge: HOME | End: 2024-11-13
Payer: MEDICARE

## 2024-11-13 VITALS
SYSTOLIC BLOOD PRESSURE: 142 MMHG | WEIGHT: 150.4 LBS | TEMPERATURE: 96.8 F | DIASTOLIC BLOOD PRESSURE: 67 MMHG | HEART RATE: 54 BPM | HEIGHT: 64 IN | BODY MASS INDEX: 25.68 KG/M2 | OXYGEN SATURATION: 99 % | RESPIRATION RATE: 16 BRPM

## 2024-11-13 DIAGNOSIS — C90.00 MULTIPLE MYELOMA NOT HAVING ACHIEVED REMISSION (HCC): Primary | ICD-10-CM

## 2024-11-13 LAB — INTERPRETATION SERPL IFE-IMP: NORMAL

## 2024-11-13 PROCEDURE — 1126F AMNT PAIN NOTED NONE PRSNT: CPT | Performed by: INTERNAL MEDICINE

## 2024-11-13 PROCEDURE — 99211 OFF/OP EST MAY X REQ PHY/QHP: CPT

## 2024-11-13 PROCEDURE — 99214 OFFICE O/P EST MOD 30 MIN: CPT | Performed by: INTERNAL MEDICINE

## 2024-11-13 PROCEDURE — 1123F ACP DISCUSS/DSCN MKR DOCD: CPT | Performed by: INTERNAL MEDICINE

## 2024-11-13 PROCEDURE — 3078F DIAST BP <80 MM HG: CPT | Performed by: INTERNAL MEDICINE

## 2024-11-13 PROCEDURE — 3077F SYST BP >= 140 MM HG: CPT | Performed by: INTERNAL MEDICINE

## 2024-11-13 NOTE — PROGRESS NOTES
MA Rooming Questions  Patient: Suyapa Serrano  MRN: [id number]    Date: 11/13/2024        1. Do you have any new issues?   yes - pt states she had her flu shot on 10/30  8/15 melanoma removed          2. Do you need any refills on medications?    no    3. Have you had any imaging done since your last visit?   no    4. Have you been hospitalized or seen in the emergency room since your last visit here?   no    5. Did the patient have a depression screening completed today? No    No data recorded     PHQ-9 Given to (if applicable):               PHQ-9 Score (if applicable):                     [] Positive     []  Negative              Does question #9 need addressed (if applicable)                     [] Yes    []  No               Paula Freeman MA

## 2024-11-15 LAB
ALBUMIN SERPL-MCNC: 2.8 G/DL (ref 3.2–5.6)
ALPHA1 GLOB SERPL ELPH-MCNC: 0.1 G/DL (ref 0.1–0.4)
ALPHA2 GLOB SERPL ELPH-MCNC: 0.4 G/DL (ref 0.4–1.2)
B-GLOBULIN SERPL ELPH-MCNC: 1.1 G/DL (ref 0.5–1.3)
GAMMA GLOB SERPL ELPH-MCNC: 1.3 G/DL (ref 0.5–1.6)
PROT PATTERN SERPL ELPH-IMP: ABNORMAL
PROT SERPL-MCNC: 5.8 G/DL

## 2024-11-18 ENCOUNTER — CLINICAL DOCUMENTATION (OUTPATIENT)
Dept: ONCOLOGY | Age: 69
End: 2024-11-18

## 2024-11-18 NOTE — PROGRESS NOTES
This nurse called the patient @ [phone] to review lab results. However there was no answer. This nurse left a VM for the patient notifying her that her labs are good at this time and that there was no M protein detected meaning she is in complete clinical remission per the physician. This RN's direct number provided if she has additional questions or concerns.

## 2024-12-06 DIAGNOSIS — C90.00 MULTIPLE MYELOMA, REMISSION STATUS UNSPECIFIED (HCC): ICD-10-CM

## 2024-12-06 RX ORDER — LENALIDOMIDE 10 MG/1
CAPSULE ORAL
Qty: 28 CAPSULE | Refills: 0 | Status: SHIPPED | OUTPATIENT
Start: 2024-12-06

## 2024-12-09 DIAGNOSIS — C90.00 MULTIPLE MYELOMA, REMISSION STATUS UNSPECIFIED (HCC): ICD-10-CM

## 2024-12-10 DIAGNOSIS — C90.00 MULTIPLE MYELOMA, REMISSION STATUS UNSPECIFIED (HCC): ICD-10-CM

## 2024-12-10 RX ORDER — LENALIDOMIDE 10 MG/1
10 CAPSULE ORAL DAILY
Qty: 28 CAPSULE | Refills: 0 | Status: ACTIVE | OUTPATIENT
Start: 2024-12-10

## 2024-12-10 RX ORDER — LENALIDOMIDE 10 MG/1
CAPSULE ORAL
Qty: 28 CAPSULE | Refills: 0 | OUTPATIENT
Start: 2024-12-10

## 2024-12-10 NOTE — PROGRESS NOTES
Revlimid 10 mg chemo capsules reordered and sent to [facility]. Prescriber survey completed and new auth # [id number]  obtained through [employer] portal. Auth valid for 30 days and attached to RX.

## 2025-01-02 DIAGNOSIS — C90.00 MULTIPLE MYELOMA, REMISSION STATUS UNSPECIFIED (HCC): ICD-10-CM

## 2025-01-02 RX ORDER — LENALIDOMIDE 10 MG/1
10 CAPSULE ORAL DAILY
Qty: 28 CAPSULE | Refills: 0 | Status: ACTIVE | OUTPATIENT
Start: 2025-01-02

## 2025-01-02 RX ORDER — LENALIDOMIDE 10 MG/1
CAPSULE ORAL
Qty: 28 CAPSULE | Refills: 0 | OUTPATIENT
Start: 2025-01-02

## 2025-01-02 NOTE — PROGRESS NOTES
Revlimid 10 mg chemo capsules reordered and sent to [facility]. Prescriber survey completed and new auth # [id number] obtained through [employer] portal. Auth valid for 30 days and attached to RX.

## 2025-01-03 DIAGNOSIS — I10 ESSENTIAL HYPERTENSION: ICD-10-CM

## 2025-01-06 RX ORDER — AMLODIPINE BESYLATE 2.5 MG/1
2.5 TABLET ORAL DAILY
Qty: 90 TABLET | Refills: 3 | Status: SHIPPED | OUTPATIENT
Start: 2025-01-06

## 2025-01-21 SDOH — ECONOMIC STABILITY: FOOD INSECURITY: WITHIN THE PAST 12 MONTHS, YOU WORRIED THAT YOUR FOOD WOULD RUN OUT BEFORE YOU GOT MONEY TO BUY MORE.: NEVER TRUE

## 2025-01-21 SDOH — ECONOMIC STABILITY: FOOD INSECURITY: WITHIN THE PAST 12 MONTHS, THE FOOD YOU BOUGHT JUST DIDN'T LAST AND YOU DIDN'T HAVE MONEY TO GET MORE.: NEVER TRUE

## 2025-01-21 SDOH — ECONOMIC STABILITY: INCOME INSECURITY: IN THE LAST 12 MONTHS, WAS THERE A TIME WHEN YOU WERE NOT ABLE TO PAY THE MORTGAGE OR RENT ON TIME?: NO

## 2025-01-21 SDOH — ECONOMIC STABILITY: TRANSPORTATION INSECURITY
IN THE PAST 12 MONTHS, HAS THE LACK OF TRANSPORTATION KEPT YOU FROM MEDICAL APPOINTMENTS OR FROM GETTING MEDICATIONS?: NO

## 2025-01-21 ASSESSMENT — PATIENT HEALTH QUESTIONNAIRE - PHQ9
SUM OF ALL RESPONSES TO PHQ9 QUESTIONS 1 & 2: 0
SUM OF ALL RESPONSES TO PHQ9 QUESTIONS 1 & 2: 0
SUM OF ALL RESPONSES TO PHQ QUESTIONS 1-9: 0
SUM OF ALL RESPONSES TO PHQ QUESTIONS 1-9: 0
1. LITTLE INTEREST OR PLEASURE IN DOING THINGS: NOT AT ALL
2. FEELING DOWN, DEPRESSED OR HOPELESS: NOT AT ALL
SUM OF ALL RESPONSES TO PHQ QUESTIONS 1-9: 0
SUM OF ALL RESPONSES TO PHQ QUESTIONS 1-9: 0
2. FEELING DOWN, DEPRESSED OR HOPELESS: NOT AT ALL
1. LITTLE INTEREST OR PLEASURE IN DOING THINGS: NOT AT ALL

## 2025-01-24 ENCOUNTER — OFFICE VISIT (OUTPATIENT)
Dept: INTERNAL MEDICINE CLINIC | Age: 70
End: 2025-01-24

## 2025-01-24 VITALS
OXYGEN SATURATION: 98 % | WEIGHT: 152.6 LBS | DIASTOLIC BLOOD PRESSURE: 66 MMHG | HEART RATE: 54 BPM | BODY MASS INDEX: 26.19 KG/M2 | SYSTOLIC BLOOD PRESSURE: 124 MMHG

## 2025-01-24 DIAGNOSIS — M75.82 TENDINITIS OF LEFT ROTATOR CUFF: ICD-10-CM

## 2025-01-24 DIAGNOSIS — C90.00 MULTIPLE MYELOMA NOT HAVING ACHIEVED REMISSION (HCC): ICD-10-CM

## 2025-01-24 DIAGNOSIS — I10 PRIMARY HYPERTENSION: Primary | ICD-10-CM

## 2025-01-24 DIAGNOSIS — I26.94 MULTIPLE SUBSEGMENTAL PULMONARY EMBOLI WITHOUT ACUTE COR PULMONALE (HCC): ICD-10-CM

## 2025-01-24 DIAGNOSIS — Z12.31 ENCOUNTER FOR SCREENING MAMMOGRAM FOR MALIGNANT NEOPLASM OF BREAST: ICD-10-CM

## 2025-01-24 RX ORDER — DULOXETIN HYDROCHLORIDE 60 MG/1
CAPSULE, DELAYED RELEASE ORAL
Qty: 90 CAPSULE | Refills: 3 | Status: SHIPPED | OUTPATIENT
Start: 2025-01-24

## 2025-01-24 RX ORDER — TRIAMCINOLONE ACETONIDE 40 MG/ML
40 INJECTION, SUSPENSION INTRA-ARTICULAR; INTRAMUSCULAR ONCE
Status: COMPLETED | OUTPATIENT
Start: 2025-01-24 | End: 2025-01-24

## 2025-01-24 RX ADMIN — TRIAMCINOLONE ACETONIDE 40 MG: 40 INJECTION, SUSPENSION INTRA-ARTICULAR; INTRAMUSCULAR at 09:00

## 2025-01-24 NOTE — PROGRESS NOTES
rotator cuff-will inject today  -     triamcinolone acetonide (KENALOG-40) injection 40 mg  -     DRAIN/INJECT LARGE JOINT/BURSA        -     Benefits and risks of procedure (including bleeding, infection, and tendon rupture) discussed with patient. After patient gave informed consent, area was sterilized with betadine. 1.5 inch 22 gauge syringe filled with 40mg of kenalog and 0.5cc of lidocaine was injected into the bursa without complication. Patient tolerated the procedure well and had immediate improvement of symptoms after the injection.     Other orders  -     DULoxetine (CYMBALTA) 60 MG extended release capsule; TAKE ONE CAPSULE BY MOUTH DAILY

## 2025-01-31 ENCOUNTER — CLINICAL DOCUMENTATION (OUTPATIENT)
Dept: ONCOLOGY | Age: 70
End: 2025-01-31

## 2025-01-31 DIAGNOSIS — C90.00 MULTIPLE MYELOMA, REMISSION STATUS UNSPECIFIED (HCC): ICD-10-CM

## 2025-01-31 RX ORDER — LENALIDOMIDE 10 MG/1
CAPSULE ORAL
Qty: 28 CAPSULE | Refills: 0 | Status: ACTIVE | OUTPATIENT
Start: 2025-01-31

## 2025-01-31 NOTE — PROGRESS NOTES
Revlimid 10 mg chemo capsules reordered and sent to [facility]. Prescriber survey completed and new auth # [id number] obtained through [employer] portal. Auth valid for 30 days and attached to RX.

## 2025-02-06 ENCOUNTER — HOSPITAL ENCOUNTER (OUTPATIENT)
Dept: INFUSION THERAPY | Age: 70
Discharge: HOME | End: 2025-02-06
Payer: MEDICARE

## 2025-02-06 DIAGNOSIS — C90.00 MULTIPLE MYELOMA NOT HAVING ACHIEVED REMISSION (HCC): ICD-10-CM

## 2025-02-06 LAB
ALBUMIN SERPL-MCNC: 4 G/DL (ref 3.4–5)
ALBUMIN/GLOB SERPL: 1.5 {RATIO} (ref 1.1–2.2)
ALP SERPL-CCNC: 56 U/L (ref 40–129)
ALT SERPL-CCNC: 29 U/L (ref 10–40)
ANION GAP SERPL CALCULATED.3IONS-SCNC: 10 MMOL/L (ref 9–17)
AST SERPL-CCNC: 24 U/L (ref 15–37)
BASOPHILS # BLD: 0.02 K/UL
BASOPHILS NFR BLD: 1 % (ref 0–1)
BILIRUB SERPL-MCNC: 0.6 MG/DL (ref 0–1)
BUN SERPL-MCNC: 17 MG/DL (ref 7–20)
CALCIUM SERPL-MCNC: 9.3 MG/DL (ref 8.3–10.6)
CHLORIDE SERPL-SCNC: 101 MMOL/L (ref 99–110)
CO2 SERPL-SCNC: 28 MMOL/L (ref 21–32)
CREAT SERPL-MCNC: 0.9 MG/DL (ref 0.6–1.2)
EOSINOPHIL # BLD: 0.1 K/UL
EOSINOPHILS RELATIVE PERCENT: 5 % (ref 0–3)
ERYTHROCYTE [DISTWIDTH] IN BLOOD BY AUTOMATED COUNT: 15.8 % (ref 11.7–14.9)
ERYTHROCYTE [SEDIMENTATION RATE] IN BLOOD BY WESTERGREN METHOD: 1 MM/HR (ref 0–30)
GFR, ESTIMATED: 59 ML/MIN/1.73M2
GLUCOSE SERPL-MCNC: 93 MG/DL (ref 74–99)
HCT VFR BLD AUTO: 37.8 % (ref 37–47)
HGB BLD-MCNC: 12.8 G/DL (ref 12.5–16)
IGA SERPL-MCNC: 240 MG/DL (ref 70–400)
IGG SERPL-MCNC: 1311 MG/DL (ref 700–1600)
IGM SERPL-MCNC: 51 MG/DL (ref 40–230)
LDH SERPL-CCNC: 129 U/L (ref 100–190)
LYMPHOCYTES NFR BLD: 0.42 K/UL
LYMPHOCYTES RELATIVE PERCENT: 19 % (ref 24–44)
MCH RBC QN AUTO: 32.6 PG (ref 27–31)
MCHC RBC AUTO-ENTMCNC: 33.9 G/DL (ref 32–36)
MCV RBC AUTO: 96.2 FL (ref 78–100)
MONOCYTES NFR BLD: 0.41 K/UL
MONOCYTES NFR BLD: 19 % (ref 0–4)
NEUTROPHILS NFR BLD: 57 % (ref 36–66)
NEUTS SEG NFR BLD: 1.25 K/UL
PLATELET # BLD AUTO: 173 K/UL (ref 140–440)
PMV BLD AUTO: 10.8 FL (ref 7.5–11.1)
POTASSIUM SERPL-SCNC: 4 MMOL/L (ref 3.5–5.1)
PROT SERPL-MCNC: 6.6 G/DL (ref 6.4–8.2)
RBC # BLD AUTO: 3.93 M/UL (ref 4.2–5.4)
SODIUM SERPL-SCNC: 139 MMOL/L (ref 136–145)
WBC OTHER # BLD: 2.2 K/UL (ref 4–10.5)

## 2025-02-06 PROCEDURE — 84155 ASSAY OF PROTEIN SERUM: CPT

## 2025-02-06 PROCEDURE — 86334 IMMUNOFIX E-PHORESIS SERUM: CPT

## 2025-02-06 PROCEDURE — 82784 ASSAY IGA/IGD/IGG/IGM EACH: CPT

## 2025-02-06 PROCEDURE — 84165 PROTEIN E-PHORESIS SERUM: CPT

## 2025-02-06 PROCEDURE — 85025 COMPLETE CBC W/AUTO DIFF WBC: CPT

## 2025-02-06 PROCEDURE — 83521 IG LIGHT CHAINS FREE EACH: CPT

## 2025-02-06 PROCEDURE — 85652 RBC SED RATE AUTOMATED: CPT

## 2025-02-06 PROCEDURE — 80053 COMPREHEN METABOLIC PANEL: CPT

## 2025-02-06 PROCEDURE — 82232 ASSAY OF BETA-2 PROTEIN: CPT

## 2025-02-06 PROCEDURE — 36415 COLL VENOUS BLD VENIPUNCTURE: CPT

## 2025-02-06 PROCEDURE — 83615 LACTATE (LD) (LDH) ENZYME: CPT

## 2025-02-07 LAB
COMMENT: ABNORMAL
KAPPA FREE LIGHT CHAIN: 56.1 MG/L (ref 2.37–20.73)
KAPPA/LAMBDA RATIO: 1.21 (ref 0.22–1.74)
LAMBDA FREE LIGHT CHAIN: 46.3 MG/L (ref 4.23–27.69)
MISCELLANEOUS LAB TEST RESULT: NORMAL
MISCELLANEOUS LAB TEST RESULT: NORMAL
TEST NAME: NORMAL
TEST NAME: NORMAL

## 2025-02-08 LAB — BETA-2 MICROGLOBULIN: 2.4 MG/L

## 2025-02-10 LAB
MISCELLANEOUS LAB TEST RESULT: NORMAL
TEST NAME: NORMAL

## 2025-02-11 LAB
MISCELLANEOUS LAB TEST RESULT: NORMAL
TEST NAME: NORMAL

## 2025-02-17 ENCOUNTER — OFFICE VISIT (OUTPATIENT)
Dept: ONCOLOGY | Age: 70
End: 2025-02-17
Payer: MEDICARE

## 2025-02-17 ENCOUNTER — HOSPITAL ENCOUNTER (OUTPATIENT)
Dept: INFUSION THERAPY | Age: 70
Discharge: HOME | End: 2025-02-17
Payer: MEDICARE

## 2025-02-17 VITALS
TEMPERATURE: 97.8 F | OXYGEN SATURATION: 100 % | SYSTOLIC BLOOD PRESSURE: 132 MMHG | BODY MASS INDEX: 25.54 KG/M2 | HEART RATE: 56 BPM | HEIGHT: 64 IN | WEIGHT: 149.6 LBS | DIASTOLIC BLOOD PRESSURE: 64 MMHG

## 2025-02-17 DIAGNOSIS — C90.00 MULTIPLE MYELOMA, REMISSION STATUS UNSPECIFIED (HCC): Primary | ICD-10-CM

## 2025-02-17 PROCEDURE — 99212 OFFICE O/P EST SF 10 MIN: CPT

## 2025-02-17 PROCEDURE — 1123F ACP DISCUSS/DSCN MKR DOCD: CPT | Performed by: INTERNAL MEDICINE

## 2025-02-17 PROCEDURE — 3075F SYST BP GE 130 - 139MM HG: CPT | Performed by: INTERNAL MEDICINE

## 2025-02-17 PROCEDURE — 3078F DIAST BP <80 MM HG: CPT | Performed by: INTERNAL MEDICINE

## 2025-02-17 PROCEDURE — 1159F MED LIST DOCD IN RCRD: CPT | Performed by: INTERNAL MEDICINE

## 2025-02-17 PROCEDURE — 1126F AMNT PAIN NOTED NONE PRSNT: CPT | Performed by: INTERNAL MEDICINE

## 2025-02-17 PROCEDURE — 99214 OFFICE O/P EST MOD 30 MIN: CPT | Performed by: INTERNAL MEDICINE

## 2025-02-17 RX ORDER — DOXYCYCLINE 100 MG/1
100 CAPSULE ORAL 2 TIMES DAILY
COMMUNITY

## 2025-02-17 NOTE — PROGRESS NOTES
MA Rooming Questions  Patient: Suyapa Serrano  MRN: [id number]    Date: 2/17/2025        1. Do you have any new issues?   no         2. Do you need any refills on medications?    No.     3. Have you had any imaging done since your last visit?   no    4. Have you been hospitalized or seen in the emergency room since your last visit here?   yes - Out patient Sx- MOH's procedure 2/12    5. Did the patient have a depression screening completed today? No    No data recorded     PHQ-9 Given to (if applicable):               PHQ-9 Score (if applicable):                     [] Positive     []  Negative              Does question #9 need addressed (if applicable)                     [] Yes    []  No               Tasha Trejo MA      
protein 14.1), normal calcium (calcium 9.9), slight elevation in serum creatinine (creatinine 1.2), elevated Beta-2 microglobulin (6.5), and elevated ESR (more than 120).  Serum protein electrophoresis and immunofixation was sent on September 14, 2015, and it showed monoclonal IgG Kappa protein of 6.8 gram/dL.  Serum Kappa light chain was 16.9 and serum Lambda light chain was less than 0.16.      I did bone marrow biopsy on September 19, 2015, and the bone marrow aspirate flow cytometry showed 38 percent plasma cell population consistent with plasma cell neoplasm.  Bone marrow biopsy  stain was about 98 percent positive for  positive plasma cell.  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  Gain of 1q has been associated with advanced disease and it is considered a high risk indicator. Cytogenetic studies showed normal female karyotype (46XX).    Ms. Serrano had a PET-CT scan on October 1, 2015, and it showed no metabolically active lytic lesions.  Focal skin thickening in the mid medial right cuff with associated metabolic activity.  This is non-specific and can be seen with infectious or inflammatory process; however, given the history of melanoma, recommend correlation with direct visualization.  Ms. Serrano was started with chemotherapy (bortezomib, cyclophosphamide, and dexamethasone) on October 2, 2015.    Ms. Serrano was seen by Dr. Ray at [facility] for possible autologous stem cell transplantation. Dr. Ray and Ms. Serrano decided to have stem cell collection for future use. Stem cell was collected on the first week of March 2016. Our plan was to continue with current chemotherapy followed by maintenance chemotherapy. Dr. Rya will consider autologous stem cell transplantation when her disease becomes

## 2025-03-02 DIAGNOSIS — I10 ESSENTIAL HYPERTENSION: ICD-10-CM

## 2025-03-03 DIAGNOSIS — C90.00 MULTIPLE MYELOMA, REMISSION STATUS UNSPECIFIED (HCC): ICD-10-CM

## 2025-03-03 RX ORDER — ATENOLOL 50 MG/1
TABLET ORAL
Qty: 90 TABLET | Refills: 2 | Status: SHIPPED | OUTPATIENT
Start: 2025-03-03

## 2025-03-03 NOTE — TELEPHONE ENCOUNTER
[facility] pharmacy is requesting a refill for Revlimid  to be sent to their pharmacy. Pending RX to mgProvider to be sent to pharmacy.

## 2025-03-04 ENCOUNTER — CLINICAL DOCUMENTATION (OUTPATIENT)
Dept: ONCOLOGY | Age: 70
End: 2025-03-04

## 2025-03-04 DIAGNOSIS — C90.00 MULTIPLE MYELOMA, REMISSION STATUS UNSPECIFIED (HCC): ICD-10-CM

## 2025-03-04 RX ORDER — LENALIDOMIDE 10 MG/1
CAPSULE ORAL
Qty: 28 CAPSULE | Refills: 0 | Status: ACTIVE | OUTPATIENT
Start: 2025-03-04

## 2025-03-04 RX ORDER — LENALIDOMIDE 10 MG/1
10 CAPSULE ORAL DAILY
Qty: 28 CAPSULE | Refills: 0 | Status: ACTIVE | OUTPATIENT
Start: 2025-03-04

## 2025-03-04 NOTE — PROGRESS NOTES
Revlimid 10 mg chemo capsules reordered and e-scribed to [facility]. Prescriber survey completed and new auth # [id number] obtained through [employer] portal. Auth valid for 30 days and attached to RX.

## 2025-03-28 ENCOUNTER — CLINICAL DOCUMENTATION (OUTPATIENT)
Dept: ONCOLOGY | Age: 70
End: 2025-03-28

## 2025-03-28 DIAGNOSIS — C90.00 MULTIPLE MYELOMA, REMISSION STATUS UNSPECIFIED (HCC): ICD-10-CM

## 2025-03-28 RX ORDER — LENALIDOMIDE 10 MG/1
CAPSULE ORAL
Qty: 28 CAPSULE | Refills: 0 | Status: ACTIVE | OUTPATIENT
Start: 2025-03-28

## 2025-03-28 NOTE — PROGRESS NOTES
Revlimid 10 mg chemo capsules reordered and sent to [facility]. Prescriber survey completed and new auth # [id number] obtained through [employer] portal. Auth valid for 30 days and attached to RX.

## 2025-04-25 DIAGNOSIS — C90.00 MULTIPLE MYELOMA, REMISSION STATUS UNSPECIFIED (HCC): ICD-10-CM

## 2025-04-25 RX ORDER — LENALIDOMIDE 10 MG/1
10 CAPSULE ORAL DAILY
Qty: 28 CAPSULE | Refills: 0 | Status: ACTIVE | OUTPATIENT
Start: 2025-04-25

## 2025-04-25 RX ORDER — LENALIDOMIDE 10 MG/1
CAPSULE ORAL
Qty: 28 CAPSULE | Refills: 0 | OUTPATIENT
Start: 2025-04-25

## 2025-05-02 DIAGNOSIS — M81.0 OSTEOPOROSIS, UNSPECIFIED OSTEOPOROSIS TYPE, UNSPECIFIED PATHOLOGICAL FRACTURE PRESENCE: ICD-10-CM

## 2025-05-05 RX ORDER — ALENDRONATE SODIUM 70 MG/1
TABLET ORAL
Qty: 4 TABLET | Refills: 11 | Status: SHIPPED | OUTPATIENT
Start: 2025-05-05

## 2025-05-12 ENCOUNTER — HOSPITAL ENCOUNTER (OUTPATIENT)
Dept: INFUSION THERAPY | Age: 70
Discharge: HOME | End: 2025-05-12
Payer: MEDICARE

## 2025-05-12 DIAGNOSIS — C90.00 MULTIPLE MYELOMA, REMISSION STATUS UNSPECIFIED (HCC): ICD-10-CM

## 2025-05-12 LAB
ALBUMIN SERPL-MCNC: 3.8 G/DL (ref 3.4–5)
ALBUMIN/GLOB SERPL: 1.5 {RATIO} (ref 1.1–2.2)
ALP SERPL-CCNC: 49 U/L (ref 40–129)
ALT SERPL-CCNC: 28 U/L (ref 10–40)
ANION GAP SERPL CALCULATED.3IONS-SCNC: 9 MMOL/L (ref 9–17)
AST SERPL-CCNC: 27 U/L (ref 15–37)
BASOPHILS # BLD: 0.01 K/UL
BASOPHILS NFR BLD: 1 % (ref 0–1)
BILIRUB SERPL-MCNC: 0.9 MG/DL (ref 0–1)
BUN SERPL-MCNC: 13 MG/DL (ref 7–20)
CALCIUM SERPL-MCNC: 8.8 MG/DL (ref 8.3–10.6)
CHLORIDE SERPL-SCNC: 102 MMOL/L (ref 99–110)
CO2 SERPL-SCNC: 26 MMOL/L (ref 21–32)
CREAT SERPL-MCNC: 1 MG/DL (ref 0.6–1.2)
EOSINOPHIL # BLD: 0.07 K/UL
EOSINOPHILS RELATIVE PERCENT: 5 % (ref 0–3)
ERYTHROCYTE [DISTWIDTH] IN BLOOD BY AUTOMATED COUNT: 15.3 % (ref 11.7–14.9)
ERYTHROCYTE [SEDIMENTATION RATE] IN BLOOD BY WESTERGREN METHOD: 1 MM/HR (ref 0–30)
GFR, ESTIMATED: 55 ML/MIN/1.73M2
GLUCOSE SERPL-MCNC: 95 MG/DL (ref 74–99)
HCT VFR BLD AUTO: 35 % (ref 37–47)
HGB BLD-MCNC: 12.2 G/DL (ref 12.5–16)
IGA SERPL-MCNC: 189 MG/DL (ref 70–400)
IGG SERPL-MCNC: 1213 MG/DL (ref 700–1600)
IGM SERPL-MCNC: 47 MG/DL (ref 40–230)
LDH SERPL-CCNC: 152 U/L (ref 100–190)
LYMPHOCYTES NFR BLD: 0.37 K/UL
LYMPHOCYTES RELATIVE PERCENT: 24 % (ref 24–44)
MCH RBC QN AUTO: 33.9 PG (ref 27–31)
MCHC RBC AUTO-ENTMCNC: 34.9 G/DL (ref 32–36)
MCV RBC AUTO: 97.2 FL (ref 78–100)
MONOCYTES NFR BLD: 0.23 K/UL
MONOCYTES NFR BLD: 15 % (ref 0–5)
NEUTROPHILS NFR BLD: 57 % (ref 36–66)
NEUTS SEG NFR BLD: 0.89 K/UL
PLATELET # BLD AUTO: 150 K/UL (ref 140–440)
PMV BLD AUTO: 11.3 FL (ref 7.5–11.1)
POTASSIUM SERPL-SCNC: 3.7 MMOL/L (ref 3.5–5.1)
PROT SERPL-MCNC: 6.3 G/DL (ref 6.4–8.2)
RBC # BLD AUTO: 3.6 M/UL (ref 4.2–5.4)
SODIUM SERPL-SCNC: 137 MMOL/L (ref 136–145)
WBC OTHER # BLD: 1.6 K/UL (ref 4–10.5)

## 2025-05-12 PROCEDURE — 36415 COLL VENOUS BLD VENIPUNCTURE: CPT

## 2025-05-12 PROCEDURE — 80053 COMPREHEN METABOLIC PANEL: CPT

## 2025-05-12 PROCEDURE — 83521 IG LIGHT CHAINS FREE EACH: CPT

## 2025-05-12 PROCEDURE — 85652 RBC SED RATE AUTOMATED: CPT

## 2025-05-12 PROCEDURE — 86334 IMMUNOFIX E-PHORESIS SERUM: CPT

## 2025-05-12 PROCEDURE — 84155 ASSAY OF PROTEIN SERUM: CPT

## 2025-05-12 PROCEDURE — 83615 LACTATE (LD) (LDH) ENZYME: CPT

## 2025-05-12 PROCEDURE — 82784 ASSAY IGA/IGD/IGG/IGM EACH: CPT

## 2025-05-12 PROCEDURE — 85025 COMPLETE CBC W/AUTO DIFF WBC: CPT

## 2025-05-13 ENCOUNTER — CLINICAL DOCUMENTATION (OUTPATIENT)
Dept: ONCOLOGY | Age: 70
End: 2025-05-13

## 2025-05-13 LAB
BETA-2 MICROGLOBULIN: 2.6 MG/L
COMMENT: ABNORMAL
KAPPA FREE LIGHT CHAIN: 54.4 MG/L (ref 2.37–20.73)
KAPPA/LAMBDA RATIO: 1.13 (ref 0.22–1.74)
LAMBDA FREE LIGHT CHAIN: 48.1 MG/L (ref 4.23–27.69)

## 2025-05-13 NOTE — PROGRESS NOTES
This nurse called the patient and advised that the physician reviewed her lab results from 5/12. The patient's WBC is low and the physician recommends that she hold her Revlimid for 1 week. The patient is scheduled for an OV with Dr Ray on 5/20 and will have labs repeated at that time. The patient verbalized understanding.

## 2025-05-13 NOTE — PROGRESS NOTES
Patient Name: Suyapa Serrano  Patient : 1955  Patient MRN: [id number]     Primary Oncologist: Emilio Ray MD  Referring Provider: Elmo Jarvis MD     Date of Service: 2025      Chief Complaint:   Chief Complaint   Patient presents with    Follow-up     Patient Active Problem List:     Multiple myeloma     HPI:  Suyapa Serrano is a 70-year-old very pleasant female with medical history significant for hypertension, anxiety disorder, and anemia since 2015, initially referred to me for evaluation for her normocytic normochromic anemia.      She stated that she has been having right lateral chest wall pain starting May 2015.  She had chest x-ray done in 2015 and it showed left sixth rib fracture.  Her pain has been controlled with ibuprofen since then.      She was found to have anemia (hemoglobin 9.3) on nd she was started with oral iron supplementation.  Her anemia got worse on repeat blood test done on 2015, (hemoglobin 8.1).  She had chest x-ray and ultrasound of the abdomen on 2015, and they were normal.      She had a colonoscopy by Dr. Gilbert about three to four years ago which was normal according to her.  She also had history of melanoma twice in the past (left lower extremity melanoma which was excised in  and right shoulder melanoma which was excised in ).    She was also found to have elevated total protein by primary care physician.  Because of her normocytic normochromic anemia associated with elevated total protein, she was subsequently referred to me for evaluation of possible plasma cell dyscrasia.      She denies fever, night sweats, loss of appetite, and weight loss.  She does not have any other significant symptoms except right lower chest wall pain.      Laboratory workups done on 2015, showed persistent normocytic normochromic anemia (hemoglobin 8.2 and MCV 89), persisted elevated total protein (total

## 2025-05-15 LAB
MISCELLANEOUS LAB TEST RESULT: ABNORMAL
MISCELLANEOUS LAB TEST RESULT: NORMAL
TEST NAME: ABNORMAL
TEST NAME: NORMAL

## 2025-05-20 ENCOUNTER — CLINICAL DOCUMENTATION (OUTPATIENT)
Dept: ONCOLOGY | Age: 70
End: 2025-05-20

## 2025-05-20 ENCOUNTER — HOSPITAL ENCOUNTER (OUTPATIENT)
Dept: INFUSION THERAPY | Age: 70
Discharge: HOME | End: 2025-05-20
Payer: MEDICARE

## 2025-05-20 ENCOUNTER — OFFICE VISIT (OUTPATIENT)
Dept: ONCOLOGY | Age: 70
End: 2025-05-20
Payer: MEDICARE

## 2025-05-20 VITALS
TEMPERATURE: 97.5 F | OXYGEN SATURATION: 97 % | SYSTOLIC BLOOD PRESSURE: 136 MMHG | HEIGHT: 64 IN | HEART RATE: 58 BPM | WEIGHT: 146.4 LBS | DIASTOLIC BLOOD PRESSURE: 67 MMHG | BODY MASS INDEX: 25 KG/M2

## 2025-05-20 DIAGNOSIS — C90.00 MULTIPLE MYELOMA, REMISSION STATUS UNSPECIFIED (HCC): Primary | ICD-10-CM

## 2025-05-20 DIAGNOSIS — C90.00 MULTIPLE MYELOMA, REMISSION STATUS UNSPECIFIED (HCC): ICD-10-CM

## 2025-05-20 LAB
BASOPHILS # BLD: 0.02 K/UL
BASOPHILS NFR BLD: 1 % (ref 0–1)
EOSINOPHIL # BLD: 0.08 K/UL
EOSINOPHILS RELATIVE PERCENT: 3 % (ref 0–3)
ERYTHROCYTE [DISTWIDTH] IN BLOOD BY AUTOMATED COUNT: 14.8 % (ref 11.7–14.9)
HCT VFR BLD AUTO: 36.2 % (ref 37–47)
HGB BLD-MCNC: 12.3 G/DL (ref 12.5–16)
LYMPHOCYTES NFR BLD: 0.59 K/UL
LYMPHOCYTES RELATIVE PERCENT: 23 % (ref 24–44)
MCH RBC QN AUTO: 33.6 PG (ref 27–31)
MCHC RBC AUTO-ENTMCNC: 34 G/DL (ref 32–36)
MCV RBC AUTO: 98.9 FL (ref 78–100)
MONOCYTES NFR BLD: 0.52 K/UL
MONOCYTES NFR BLD: 20 % (ref 0–5)
NEUTROPHILS NFR BLD: 53 % (ref 36–66)
NEUTS SEG NFR BLD: 1.36 K/UL
PLATELET # BLD AUTO: 166 K/UL (ref 140–440)
PMV BLD AUTO: 10.9 FL (ref 7.5–11.1)
RBC # BLD AUTO: 3.66 M/UL (ref 4.2–5.4)
WBC OTHER # BLD: 2.6 K/UL (ref 4–10.5)

## 2025-05-20 PROCEDURE — 36415 COLL VENOUS BLD VENIPUNCTURE: CPT

## 2025-05-20 PROCEDURE — 99214 OFFICE O/P EST MOD 30 MIN: CPT | Performed by: INTERNAL MEDICINE

## 2025-05-20 PROCEDURE — 1159F MED LIST DOCD IN RCRD: CPT | Performed by: INTERNAL MEDICINE

## 2025-05-20 PROCEDURE — 3075F SYST BP GE 130 - 139MM HG: CPT | Performed by: INTERNAL MEDICINE

## 2025-05-20 PROCEDURE — 3078F DIAST BP <80 MM HG: CPT | Performed by: INTERNAL MEDICINE

## 2025-05-20 PROCEDURE — 1126F AMNT PAIN NOTED NONE PRSNT: CPT | Performed by: INTERNAL MEDICINE

## 2025-05-20 PROCEDURE — 85025 COMPLETE CBC W/AUTO DIFF WBC: CPT

## 2025-05-20 PROCEDURE — 1123F ACP DISCUSS/DSCN MKR DOCD: CPT | Performed by: INTERNAL MEDICINE

## 2025-05-20 PROCEDURE — 99212 OFFICE O/P EST SF 10 MIN: CPT

## 2025-05-20 NOTE — PROGRESS NOTES
MA Rooming Questions  Patient: Suyapa Serrano  MRN: [id number]    Date: 5/20/2025        1. Do you have any new issues?   no         2. Do you need any refills on medications?    no    3. Have you had any imaging done since your last visit?   no    4. Have you been hospitalized or seen in the emergency room since your last visit here?   no    5. Did the patient have a depression screening completed today? No    No data recorded     PHQ-9 Given to (if applicable):               PHQ-9 Score (if applicable):                     [] Positive     []  Negative              Does question #9 need addressed (if applicable)                     [] Yes    []  No               Tasha Trejo MA

## 2025-05-20 NOTE — PROGRESS NOTES
Attempted to call patient @  [phone] to notify that ANC is better (now 1.36) and she can resume back on revlimid tomorrow 05/21/2025; however, no answer. VM left with this RN direct number should patient have any questions.

## 2025-05-21 ENCOUNTER — CLINICAL DOCUMENTATION (OUTPATIENT)
Dept: ONCOLOGY | Age: 70
End: 2025-05-21

## 2025-05-21 DIAGNOSIS — C90.00 MULTIPLE MYELOMA, REMISSION STATUS UNSPECIFIED (HCC): ICD-10-CM

## 2025-05-21 RX ORDER — LENALIDOMIDE 10 MG/1
CAPSULE ORAL
Qty: 28 CAPSULE | Refills: 0 | Status: ACTIVE | OUTPATIENT
Start: 2025-05-21

## 2025-05-21 NOTE — PROGRESS NOTES
Called patient @ [phone] to notify that ANC improved and patient can resume Revlimid today 05/21/2025. Patient voices understanding. No further needs addressed at this time.

## 2025-05-21 NOTE — PROGRESS NOTES
Revlimid 10 mg chemo capsules reordered and e-scribed to [facility]. Prescriber survey completed and new auth # [id number] obtained through [employer] portal. Auth valid for 30 days and attached to RX.

## 2025-05-23 NOTE — PROGRESS NOTES
Caller: Gisell Varghese    Relationship to patient: Self    Best call back number: [phone]     Patient is needing: PATIENT IS ASKING FOR A TRANSFER OF CARE FROM [facility] TO DR PORRAS. PATIENT FEELS SHE WOULD GET BETTER CARE UNDER DR PORRAS    PATIENT WOULD LIKE A CALL TO SCHEDULE WITH DR PORRAS ONCE THIS IS COMPLETED           [address] [facility] [place] [facility]    Date: 09/01/22    Medication is currently being filled at [facility] ER and has been routed there. Please call us with any questions at [phone] opt.  2.

## 2025-06-18 ENCOUNTER — CLINICAL DOCUMENTATION (OUTPATIENT)
Dept: ONCOLOGY | Age: 70
End: 2025-06-18

## 2025-06-18 NOTE — PROGRESS NOTES
Patient Assistance    Subject: Urgent Need [employer] Approval Notification  The Urgent Need Sanford through the [employer] recently became available. This sanford provides eligible patients with $500 to support various needs during treatment.  The patient has been approved for this assistance and expressed gratitude for the support. A check will be mailed directly to them within 7 to 10 business days.        Kind regards,  Yadira Mckinnon  Financial Navigator

## 2025-06-30 DIAGNOSIS — C90.00 MULTIPLE MYELOMA, REMISSION STATUS UNSPECIFIED (HCC): ICD-10-CM

## 2025-07-01 ENCOUNTER — CLINICAL DOCUMENTATION (OUTPATIENT)
Dept: ONCOLOGY | Age: 70
End: 2025-07-01

## 2025-07-01 RX ORDER — LENALIDOMIDE 10 MG/1
CAPSULE ORAL
Qty: 28 CAPSULE | Refills: 0 | Status: ACTIVE | OUTPATIENT
Start: 2025-07-01

## 2025-07-01 NOTE — PROGRESS NOTES
Revlimid 10mg chemo capsules reordered and sent to [facility]. Prescriber survey completed and new auth # [id number]  obtained through [employer] portal. Auth valid for 30 days and attached to RX.

## 2025-07-18 LAB
ALBUMIN: 3.8 G/DL (ref 3.9–4.9)
ALP BLD-CCNC: 47 IU/L (ref 44–121)
ALT SERPL-CCNC: 18 IU/L (ref 0–32)
AST SERPL-CCNC: 18 IU/L (ref 0–40)
BILIRUB SERPL-MCNC: 0.8 MG/DL (ref 0–1.2)
BUN / CREAT RATIO: 12 (ref 12–28)
BUN BLDV-MCNC: 12 MG/DL (ref 8–27)
CALCIUM SERPL-MCNC: 8.9 MG/DL (ref 8.7–10.3)
CHLORIDE BLD-SCNC: 101 MMOL/L (ref 96–106)
CHOLESTEROL, TOTAL: 168 MG/DL (ref 100–199)
CO2: 26 MMOL/L (ref 20–29)
CREAT SERPL-MCNC: 1.04 MG/DL (ref 0.57–1)
ESTIMATED GLOMERULAR FILTRATION RATE CREATININE EQUATION: 58 ML/MIN/1.73
GLOBULIN: 2.3 G/DL (ref 1.5–4.5)
GLUCOSE BLD-MCNC: 98 MG/DL (ref 70–99)
HDLC SERPL-MCNC: 67 MG/DL
LDL CHOLESTEROL: 86 MG/DL (ref 0–99)
POTASSIUM SERPL-SCNC: 4 MMOL/L (ref 3.5–5.2)
SODIUM BLD-SCNC: 140 MMOL/L (ref 134–144)
TOTAL PROTEIN: 6.1 G/DL (ref 6–8.5)
TRIGL SERPL-MCNC: 79 MG/DL (ref 0–149)
VLDLC SERPL CALC-MCNC: 15 MG/DL (ref 5–40)

## 2025-07-24 ENCOUNTER — OFFICE VISIT (OUTPATIENT)
Dept: INTERNAL MEDICINE CLINIC | Age: 70
End: 2025-07-24
Payer: MEDICARE

## 2025-07-24 VITALS
BODY MASS INDEX: 25.4 KG/M2 | DIASTOLIC BLOOD PRESSURE: 78 MMHG | HEART RATE: 60 BPM | SYSTOLIC BLOOD PRESSURE: 138 MMHG | OXYGEN SATURATION: 99 % | WEIGHT: 148 LBS

## 2025-07-24 VITALS
WEIGHT: 148 LBS | DIASTOLIC BLOOD PRESSURE: 78 MMHG | OXYGEN SATURATION: 99 % | HEIGHT: 64 IN | SYSTOLIC BLOOD PRESSURE: 138 MMHG | BODY MASS INDEX: 25.27 KG/M2

## 2025-07-24 DIAGNOSIS — I10 ESSENTIAL HYPERTENSION: Primary | ICD-10-CM

## 2025-07-24 DIAGNOSIS — C90.00 MULTIPLE MYELOMA, REMISSION STATUS UNSPECIFIED (HCC): ICD-10-CM

## 2025-07-24 DIAGNOSIS — F41.9 ANXIETY: ICD-10-CM

## 2025-07-24 DIAGNOSIS — Z12.11 COLON CANCER SCREENING: ICD-10-CM

## 2025-07-24 DIAGNOSIS — Z00.00 INITIAL MEDICARE ANNUAL WELLNESS VISIT: Primary | ICD-10-CM

## 2025-07-24 DIAGNOSIS — I26.94 MULTIPLE SUBSEGMENTAL PULMONARY EMBOLI WITHOUT ACUTE COR PULMONALE (HCC): ICD-10-CM

## 2025-07-24 PROCEDURE — 1123F ACP DISCUSS/DSCN MKR DOCD: CPT | Performed by: INTERNAL MEDICINE

## 2025-07-24 PROCEDURE — 3075F SYST BP GE 130 - 139MM HG: CPT | Performed by: INTERNAL MEDICINE

## 2025-07-24 PROCEDURE — G2211 COMPLEX E/M VISIT ADD ON: HCPCS | Performed by: INTERNAL MEDICINE

## 2025-07-24 PROCEDURE — 1159F MED LIST DOCD IN RCRD: CPT | Performed by: INTERNAL MEDICINE

## 2025-07-24 PROCEDURE — 3078F DIAST BP <80 MM HG: CPT | Performed by: INTERNAL MEDICINE

## 2025-07-24 PROCEDURE — 99214 OFFICE O/P EST MOD 30 MIN: CPT | Performed by: INTERNAL MEDICINE

## 2025-07-24 PROCEDURE — G0438 PPPS, INITIAL VISIT: HCPCS | Performed by: INTERNAL MEDICINE

## 2025-07-24 ASSESSMENT — LIFESTYLE VARIABLES
HOW OFTEN DO YOU HAVE A DRINK CONTAINING ALCOHOL: 2-3 TIMES A WEEK
HOW MANY STANDARD DRINKS CONTAINING ALCOHOL DO YOU HAVE ON A TYPICAL DAY: 1 OR 2

## 2025-07-24 ASSESSMENT — PATIENT HEALTH QUESTIONNAIRE - PHQ9
2. FEELING DOWN, DEPRESSED OR HOPELESS: NOT AT ALL
SUM OF ALL RESPONSES TO PHQ QUESTIONS 1-9: 0
1. LITTLE INTEREST OR PLEASURE IN DOING THINGS: NOT AT ALL

## 2025-07-24 NOTE — PROGRESS NOTES
Suyapa Serrano  1955 07/24/25    SUBJECTIVE:    One month ago pt went to the ER for a respiratory infection. She was treated with doxy with improvement of her symptoms, but she has not recovered her sense of taste.    Pt with 4 biopsies from derm yesterday.    The patient is taking hypertensive medications compliantly without side effects.  Denies chest pain, dyspnea, edema, or TIA's. Blood pressure has been 130 systolic.     She denies any blood in the stool, black stools. She is on eliquis for PE. She continues to follow with Dr Ray, continues on revlimid.     Cymbalta doing well for anxiety.    OBJECTIVE:    /78 (BP Site: Left Upper Arm, Patient Position: Sitting, BP Cuff Size: Medium Adult)   Pulse 60   Wt 67.1 kg (148 lb)   SpO2 99%   BMI 25.40 kg/m²     Physical Exam  Constitutional:       Appearance: She is well-developed.   Eyes:      General: No scleral icterus.     Conjunctiva/sclera: Conjunctivae normal.   Neck:      Thyroid: No thyromegaly.      Trachea: No tracheal deviation.   Cardiovascular:      Rate and Rhythm: Normal rate and regular rhythm.      Heart sounds: Normal heart sounds. No murmur heard.     No friction rub. No gallop.   Pulmonary:      Effort: Pulmonary effort is normal. No respiratory distress.      Breath sounds: Normal breath sounds. No wheezing or rales.   Abdominal:      General: Bowel sounds are normal. There is no distension.      Palpations: Abdomen is soft. There is no hepatomegaly or mass.      Tenderness: There is no abdominal tenderness. There is no guarding or rebound.   Musculoskeletal:      Cervical back: Neck supple.   Lymphadenopathy:      Cervical: No cervical adenopathy.   Skin:     Nails: There is no clubbing.   Neurological:      Mental Status: She is alert and oriented to person, place, and time.   Psychiatric:         Behavior: Behavior normal.         Judgment: Judgment normal.         ASSESSMENT:    1. Essential hypertension    2. Colon cancer

## 2025-07-24 NOTE — PROGRESS NOTES
Medicare Annual Wellness Visit    Suyapa Serrano is here for Medicare AWV    Assessment & Plan   Initial Medicare annual wellness visit     No follow-ups on file.     Subjective       Patient's complete Health Risk Assessment and screening values have been reviewed and are found in Flowsheets. The following problems were reviewed today and where indicated follow up appointments were made and/or referrals ordered.    No Positive Risk Factors identified today.                                     Objective   Vitals:    07/24/25 0852   BP: 138/78   SpO2: 99%   Weight: 67.1 kg (148 lb)   Height: 1.626 m (5' 4\")      Body mass index is 25.4 kg/m².                   Allergies   Allergen Reactions    Valacyclovir Rash     Stopped at same time as allopurinol. Unsure which drug caused reaction.   Stopped at same time as allopurinol. Unsure which drug caused reaction.      Prior to Visit Medications    Medication Sig Taking? Authorizing Provider   lenalidomide (REVLIMID) 10 MG chemo capsule TAKE 1 CAPSULE BY MOUTH 1 TIME A DAY Yes Eimlio Ray MD   alendronate (FOSAMAX) 70 MG tablet TAKE 1 TABLET BY MOUTH ONCE WEEKLY Yes Elmo Jarvis MD   lenalidomide (REVLIMID) 10 MG chemo capsule TAKE 1 CAPSULE BY MOUTH 1 TIME A DAY Yes Emilio Ray MD   atenolol (TENORMIN) 50 MG tablet TAKE 1 TABLET BY MOUTH DAILY Yes Elmo Jarvis MD   DULoxetine (CYMBALTA) 60 MG extended release capsule TAKE ONE CAPSULE BY MOUTH DAILY Yes Elmo Jarvis MD   amLODIPine (NORVASC) 2.5 MG tablet TAKE 1 TABLET BY MOUTH DAILY Yes Elmo Jarvis MD   ELIQUIS 5 MG TABS tablet TAKE 1 TABLET BY MOUTH TWICE A DAY Yes Emigdio Ferrell, APRN - CNP   aspirin EC 81 MG EC tablet Take 1 tablet by mouth daily Yes Elmo Jarvis MD   niacinamide 500 MG tablet Take 1 tablet by mouth 2 times daily (with meals) Yes Preet Ontiveros MD   Multiple Vitamin (MULTIVITAMIN) capsule Take by mouth Yes Preet Ontiveros

## 2025-07-30 ENCOUNTER — HOSPITAL ENCOUNTER (OUTPATIENT)
Dept: WOMENS IMAGING | Age: 70
Discharge: HOME | End: 2025-07-30
Payer: MEDICARE

## 2025-07-30 VITALS — WEIGHT: 148 LBS | HEIGHT: 64 IN | BODY MASS INDEX: 25.27 KG/M2

## 2025-07-30 DIAGNOSIS — Z12.31 ENCOUNTER FOR SCREENING MAMMOGRAM FOR MALIGNANT NEOPLASM OF BREAST: ICD-10-CM

## 2025-07-30 PROCEDURE — 77063 BREAST TOMOSYNTHESIS BI: CPT

## 2025-08-08 ENCOUNTER — CLINICAL DOCUMENTATION (OUTPATIENT)
Dept: ONCOLOGY | Age: 70
End: 2025-08-08

## 2025-08-08 DIAGNOSIS — C90.00 MULTIPLE MYELOMA, REMISSION STATUS UNSPECIFIED (HCC): ICD-10-CM

## 2025-08-08 RX ORDER — LENALIDOMIDE 10 MG/1
CAPSULE ORAL
Qty: 28 CAPSULE | Refills: 0 | Status: ACTIVE | OUTPATIENT
Start: 2025-08-08

## 2025-08-13 ENCOUNTER — HOSPITAL ENCOUNTER (OUTPATIENT)
Dept: INFUSION THERAPY | Age: 70
Discharge: HOME | End: 2025-08-13
Payer: MEDICARE

## 2025-08-13 DIAGNOSIS — C90.00 MULTIPLE MYELOMA, REMISSION STATUS UNSPECIFIED (HCC): ICD-10-CM

## 2025-08-13 LAB
ALBUMIN SERPL-MCNC: 3.8 G/DL (ref 3.4–5)
ALBUMIN/GLOB SERPL: 1.6 {RATIO} (ref 1.1–2.2)
ALP SERPL-CCNC: 48 U/L (ref 40–129)
ALT SERPL-CCNC: 22 U/L (ref 10–40)
ANION GAP SERPL CALCULATED.3IONS-SCNC: 7 MMOL/L (ref 9–17)
AST SERPL-CCNC: 21 U/L (ref 15–37)
BASOPHILS # BLD: 0.01 K/UL
BASOPHILS NFR BLD: 1 % (ref 0–1)
BILIRUB SERPL-MCNC: 0.7 MG/DL (ref 0–1)
BUN SERPL-MCNC: 13 MG/DL (ref 7–20)
CALCIUM SERPL-MCNC: 8.9 MG/DL (ref 8.3–10.6)
CHLORIDE SERPL-SCNC: 101 MMOL/L (ref 99–110)
CO2 SERPL-SCNC: 29 MMOL/L (ref 21–32)
CREAT SERPL-MCNC: 1 MG/DL (ref 0.6–1.2)
EOSINOPHIL # BLD: 0.1 K/UL
EOSINOPHILS RELATIVE PERCENT: 5 % (ref 0–3)
ERYTHROCYTE [DISTWIDTH] IN BLOOD BY AUTOMATED COUNT: 14.9 % (ref 11.7–14.9)
ERYTHROCYTE [SEDIMENTATION RATE] IN BLOOD BY WESTERGREN METHOD: 1 MM/HR (ref 0–30)
GFR, ESTIMATED: 53 ML/MIN/1.73M2
GLUCOSE SERPL-MCNC: 99 MG/DL (ref 74–99)
HCT VFR BLD AUTO: 36.9 % (ref 37–47)
HGB BLD-MCNC: 12.4 G/DL (ref 12.5–16)
IGA SERPL-MCNC: 191 MG/DL (ref 70–400)
IGG SERPL-MCNC: 1097 MG/DL (ref 700–1600)
IGM SERPL-MCNC: 37 MG/DL (ref 40–230)
LDH SERPL-CCNC: 142 U/L (ref 100–190)
LYMPHOCYTES NFR BLD: 0.37 K/UL
LYMPHOCYTES RELATIVE PERCENT: 18 % (ref 24–44)
MCH RBC QN AUTO: 33.1 PG (ref 27–31)
MCHC RBC AUTO-ENTMCNC: 33.6 G/DL (ref 32–36)
MCV RBC AUTO: 98.4 FL (ref 78–100)
MONOCYTES NFR BLD: 0.29 K/UL
MONOCYTES NFR BLD: 14 % (ref 0–5)
NEUTROPHILS NFR BLD: 64 % (ref 36–66)
NEUTS SEG NFR BLD: 1.35 K/UL
PLATELET # BLD AUTO: 150 K/UL (ref 140–440)
PMV BLD AUTO: 10.6 FL (ref 7.5–11.1)
POTASSIUM SERPL-SCNC: 3.9 MMOL/L (ref 3.5–5.1)
PROT SERPL-MCNC: 6.3 G/DL (ref 6.4–8.2)
RBC # BLD AUTO: 3.75 M/UL (ref 4.2–5.4)
SODIUM SERPL-SCNC: 136 MMOL/L (ref 136–145)
WBC OTHER # BLD: 2.1 K/UL (ref 4–10.5)

## 2025-08-13 PROCEDURE — 86334 IMMUNOFIX E-PHORESIS SERUM: CPT

## 2025-08-13 PROCEDURE — 82784 ASSAY IGA/IGD/IGG/IGM EACH: CPT

## 2025-08-13 PROCEDURE — 80053 COMPREHEN METABOLIC PANEL: CPT

## 2025-08-13 PROCEDURE — 84155 ASSAY OF PROTEIN SERUM: CPT

## 2025-08-13 PROCEDURE — 83521 IG LIGHT CHAINS FREE EACH: CPT

## 2025-08-13 PROCEDURE — 36415 COLL VENOUS BLD VENIPUNCTURE: CPT

## 2025-08-13 PROCEDURE — 85652 RBC SED RATE AUTOMATED: CPT

## 2025-08-13 PROCEDURE — 83615 LACTATE (LD) (LDH) ENZYME: CPT

## 2025-08-13 PROCEDURE — 85025 COMPLETE CBC W/AUTO DIFF WBC: CPT

## 2025-08-13 PROCEDURE — 82232 ASSAY OF BETA-2 PROTEIN: CPT

## 2025-08-13 PROCEDURE — 84165 PROTEIN E-PHORESIS SERUM: CPT

## 2025-08-14 LAB — NONINV COLON CA DNA+OCC BLD SCRN STL QL: NEGATIVE

## 2025-08-15 LAB
BETA-2 MICROGLOBULIN: 2.6 MG/L
COMMENT: ABNORMAL
KAPPA FREE LIGHT CHAIN: 49.4 MG/L (ref 2.37–20.73)
KAPPA/LAMBDA RATIO: 1.13 (ref 0.22–1.74)
LAMBDA FREE LIGHT CHAIN: 43.8 MG/L (ref 4.23–27.69)

## 2025-08-20 ENCOUNTER — OFFICE VISIT (OUTPATIENT)
Dept: ONCOLOGY | Age: 70
End: 2025-08-20
Payer: MEDICARE

## 2025-08-20 ENCOUNTER — HOSPITAL ENCOUNTER (OUTPATIENT)
Dept: INFUSION THERAPY | Age: 70
Discharge: HOME | End: 2025-08-20
Payer: MEDICARE

## 2025-08-20 VITALS
HEIGHT: 64 IN | OXYGEN SATURATION: 98 % | HEART RATE: 56 BPM | SYSTOLIC BLOOD PRESSURE: 139 MMHG | TEMPERATURE: 97.7 F | BODY MASS INDEX: 24.75 KG/M2 | WEIGHT: 145 LBS | DIASTOLIC BLOOD PRESSURE: 67 MMHG | RESPIRATION RATE: 16 BRPM

## 2025-08-20 DIAGNOSIS — C90.00 MULTIPLE MYELOMA, REMISSION STATUS UNSPECIFIED (HCC): Primary | ICD-10-CM

## 2025-08-20 LAB
MISCELLANEOUS LAB TEST RESULT: NORMAL
TEST NAME: NORMAL

## 2025-08-20 PROCEDURE — 3078F DIAST BP <80 MM HG: CPT | Performed by: INTERNAL MEDICINE

## 2025-08-20 PROCEDURE — 1126F AMNT PAIN NOTED NONE PRSNT: CPT | Performed by: INTERNAL MEDICINE

## 2025-08-20 PROCEDURE — 99214 OFFICE O/P EST MOD 30 MIN: CPT | Performed by: INTERNAL MEDICINE

## 2025-08-20 PROCEDURE — 99212 OFFICE O/P EST SF 10 MIN: CPT

## 2025-08-20 PROCEDURE — 3075F SYST BP GE 130 - 139MM HG: CPT | Performed by: INTERNAL MEDICINE

## 2025-08-20 PROCEDURE — 1159F MED LIST DOCD IN RCRD: CPT | Performed by: INTERNAL MEDICINE

## 2025-08-20 PROCEDURE — 1123F ACP DISCUSS/DSCN MKR DOCD: CPT | Performed by: INTERNAL MEDICINE

## 2025-08-21 LAB
MISCELLANEOUS LAB TEST RESULT: ABNORMAL
TEST NAME: ABNORMAL

## 2025-08-22 ENCOUNTER — CLINICAL DOCUMENTATION (OUTPATIENT)
Dept: ONCOLOGY | Age: 70
End: 2025-08-22